# Patient Record
Sex: MALE | Race: WHITE | Employment: UNEMPLOYED | URBAN - METROPOLITAN AREA
[De-identification: names, ages, dates, MRNs, and addresses within clinical notes are randomized per-mention and may not be internally consistent; named-entity substitution may affect disease eponyms.]

---

## 2020-01-01 ENCOUNTER — APPOINTMENT (OUTPATIENT)
Dept: NON INVASIVE DIAGNOSTICS | Age: 85
DRG: 871 | End: 2020-01-01
Attending: INTERNAL MEDICINE
Payer: MEDICARE

## 2020-01-01 ENCOUNTER — APPOINTMENT (OUTPATIENT)
Dept: MRI IMAGING | Age: 85
DRG: 871 | End: 2020-01-01
Attending: INTERNAL MEDICINE
Payer: MEDICARE

## 2020-01-01 ENCOUNTER — APPOINTMENT (OUTPATIENT)
Dept: GENERAL RADIOLOGY | Age: 85
DRG: 871 | End: 2020-01-01
Attending: EMERGENCY MEDICINE
Payer: MEDICARE

## 2020-01-01 ENCOUNTER — APPOINTMENT (OUTPATIENT)
Dept: GENERAL RADIOLOGY | Age: 85
DRG: 871 | End: 2020-01-01
Attending: INTERNAL MEDICINE
Payer: MEDICARE

## 2020-01-01 ENCOUNTER — APPOINTMENT (OUTPATIENT)
Dept: CT IMAGING | Age: 85
DRG: 871 | End: 2020-01-01
Attending: INTERNAL MEDICINE
Payer: MEDICARE

## 2020-01-01 ENCOUNTER — HOSPITAL ENCOUNTER (INPATIENT)
Age: 85
LOS: 11 days | DRG: 871 | End: 2020-11-23
Attending: EMERGENCY MEDICINE | Admitting: INTERNAL MEDICINE
Payer: MEDICARE

## 2020-01-01 ENCOUNTER — APPOINTMENT (OUTPATIENT)
Dept: CT IMAGING | Age: 85
DRG: 871 | End: 2020-01-01
Attending: EMERGENCY MEDICINE
Payer: MEDICARE

## 2020-01-01 ENCOUNTER — APPOINTMENT (OUTPATIENT)
Dept: VASCULAR SURGERY | Age: 85
DRG: 871 | End: 2020-01-01
Attending: INTERNAL MEDICINE
Payer: MEDICARE

## 2020-01-01 ENCOUNTER — APPOINTMENT (OUTPATIENT)
Dept: GENERAL RADIOLOGY | Age: 85
DRG: 871 | End: 2020-01-01
Attending: FAMILY MEDICINE
Payer: MEDICARE

## 2020-01-01 VITALS
HEART RATE: 81 BPM | WEIGHT: 228 LBS | TEMPERATURE: 98.2 F | SYSTOLIC BLOOD PRESSURE: 74 MMHG | BODY MASS INDEX: 29.26 KG/M2 | RESPIRATION RATE: 26 BRPM | OXYGEN SATURATION: 98 % | DIASTOLIC BLOOD PRESSURE: 47 MMHG | HEIGHT: 74 IN

## 2020-01-01 DIAGNOSIS — I69.30 LATE EFFECT OF STROKE: ICD-10-CM

## 2020-01-01 DIAGNOSIS — F01.52 VASCULAR DEMENTIA WITH DELUSIONS: ICD-10-CM

## 2020-01-01 DIAGNOSIS — A41.9 SEPSIS, DUE TO UNSPECIFIED ORGANISM, UNSPECIFIED WHETHER ACUTE ORGAN DYSFUNCTION PRESENT (HCC): ICD-10-CM

## 2020-01-01 DIAGNOSIS — H91.90 HEARING LOSS, UNSPECIFIED HEARING LOSS TYPE, UNSPECIFIED LATERALITY: ICD-10-CM

## 2020-01-01 DIAGNOSIS — Z71.89 GOALS OF CARE, COUNSELING/DISCUSSION: ICD-10-CM

## 2020-01-01 DIAGNOSIS — R41.82 ALTERED MENTAL STATUS, UNSPECIFIED ALTERED MENTAL STATUS TYPE: ICD-10-CM

## 2020-01-01 DIAGNOSIS — R53.81 DEBILITY: ICD-10-CM

## 2020-01-01 DIAGNOSIS — I21.4 NSTEMI (NON-ST ELEVATED MYOCARDIAL INFARCTION) (HCC): Primary | ICD-10-CM

## 2020-01-01 LAB
ALBUMIN SERPL-MCNC: 3.1 G/DL (ref 3.5–5)
ALBUMIN SERPL-MCNC: 3.6 G/DL (ref 3.5–5)
ALBUMIN/GLOB SERPL: 0.8 {RATIO} (ref 1.1–2.2)
ALBUMIN/GLOB SERPL: 0.9 {RATIO} (ref 1.1–2.2)
ALP SERPL-CCNC: 75 U/L (ref 45–117)
ALP SERPL-CCNC: 76 U/L (ref 45–117)
ALT SERPL-CCNC: 52 U/L (ref 12–78)
ALT SERPL-CCNC: 54 U/L (ref 12–78)
AMMONIA PLAS-SCNC: 17 UMOL/L
ANION GAP SERPL CALC-SCNC: 10 MMOL/L (ref 5–15)
ANION GAP SERPL CALC-SCNC: 11 MMOL/L (ref 5–15)
ANION GAP SERPL CALC-SCNC: 12 MMOL/L (ref 5–15)
ANION GAP SERPL CALC-SCNC: 12 MMOL/L (ref 5–15)
ANION GAP SERPL CALC-SCNC: 14 MMOL/L (ref 5–15)
ANION GAP SERPL CALC-SCNC: 4 MMOL/L (ref 5–15)
ANION GAP SERPL CALC-SCNC: 5 MMOL/L (ref 5–15)
ANION GAP SERPL CALC-SCNC: 6 MMOL/L (ref 5–15)
ANION GAP SERPL CALC-SCNC: 7 MMOL/L (ref 5–15)
ANION GAP SERPL CALC-SCNC: 9 MMOL/L (ref 5–15)
ANION GAP SERPL CALC-SCNC: 9 MMOL/L (ref 5–15)
APPEARANCE UR: CLEAR
APTT PPP: 22.6 SEC (ref 22.1–31)
APTT PPP: 29 SEC (ref 22.1–31)
APTT PPP: 37.2 SEC (ref 22.1–31)
APTT PPP: 55.6 SEC (ref 22.1–31)
APTT PPP: 59.8 SEC (ref 22.1–31)
APTT PPP: 62.6 SEC (ref 22.1–31)
AST SERPL-CCNC: 187 U/L (ref 15–37)
AST SERPL-CCNC: 313 U/L (ref 15–37)
ATRIAL RATE: 108 BPM
ATRIAL RATE: 117 BPM
ATRIAL RATE: 98 BPM
BACTERIA SPEC CULT: ABNORMAL
BACTERIA SPEC CULT: ABNORMAL
BACTERIA SPEC CULT: NORMAL
BACTERIA URNS QL MICRO: NEGATIVE /HPF
BASOPHILS # BLD: 0 K/UL (ref 0–0.1)
BASOPHILS # BLD: 0.1 K/UL (ref 0–0.1)
BASOPHILS # BLD: 0.2 K/UL (ref 0–0.1)
BASOPHILS NFR BLD: 0 % (ref 0–1)
BASOPHILS NFR BLD: 1 % (ref 0–1)
BILIRUB SERPL-MCNC: 1.3 MG/DL (ref 0.2–1)
BILIRUB SERPL-MCNC: 1.3 MG/DL (ref 0.2–1)
BILIRUB UR QL: NEGATIVE
BNP SERPL-MCNC: ABNORMAL PG/ML
BUN SERPL-MCNC: 27 MG/DL (ref 6–20)
BUN SERPL-MCNC: 33 MG/DL (ref 6–20)
BUN SERPL-MCNC: 36 MG/DL (ref 6–20)
BUN SERPL-MCNC: 36 MG/DL (ref 6–20)
BUN SERPL-MCNC: 40 MG/DL (ref 6–20)
BUN SERPL-MCNC: 46 MG/DL (ref 6–20)
BUN SERPL-MCNC: 47 MG/DL (ref 6–20)
BUN SERPL-MCNC: 49 MG/DL (ref 6–20)
BUN SERPL-MCNC: 53 MG/DL (ref 6–20)
BUN SERPL-MCNC: 57 MG/DL (ref 6–20)
BUN SERPL-MCNC: 76 MG/DL (ref 6–20)
BUN/CREAT SERPL: 19 (ref 12–20)
BUN/CREAT SERPL: 27 (ref 12–20)
BUN/CREAT SERPL: 28 (ref 12–20)
BUN/CREAT SERPL: 34 (ref 12–20)
BUN/CREAT SERPL: 39 (ref 12–20)
BUN/CREAT SERPL: 41 (ref 12–20)
BUN/CREAT SERPL: 43 (ref 12–20)
BUN/CREAT SERPL: 44 (ref 12–20)
BUN/CREAT SERPL: 51 (ref 12–20)
BUN/CREAT SERPL: 54 (ref 12–20)
BUN/CREAT SERPL: 58 (ref 12–20)
CALCIUM SERPL-MCNC: 10 MG/DL (ref 8.5–10.1)
CALCIUM SERPL-MCNC: 10.4 MG/DL (ref 8.5–10.1)
CALCIUM SERPL-MCNC: 8.8 MG/DL (ref 8.5–10.1)
CALCIUM SERPL-MCNC: 9 MG/DL (ref 8.5–10.1)
CALCIUM SERPL-MCNC: 9.1 MG/DL (ref 8.5–10.1)
CALCIUM SERPL-MCNC: 9.3 MG/DL (ref 8.5–10.1)
CALCIUM SERPL-MCNC: 9.4 MG/DL (ref 8.5–10.1)
CALCIUM SERPL-MCNC: 9.6 MG/DL (ref 8.5–10.1)
CALCIUM SERPL-MCNC: 9.7 MG/DL (ref 8.5–10.1)
CALCULATED R AXIS, ECG10: -77 DEGREES
CALCULATED R AXIS, ECG10: -78 DEGREES
CALCULATED R AXIS, ECG10: -82 DEGREES
CALCULATED T AXIS, ECG11: 82 DEGREES
CALCULATED T AXIS, ECG11: 82 DEGREES
CALCULATED T AXIS, ECG11: 83 DEGREES
CHLORIDE SERPL-SCNC: 104 MMOL/L (ref 97–108)
CHLORIDE SERPL-SCNC: 107 MMOL/L (ref 97–108)
CHLORIDE SERPL-SCNC: 108 MMOL/L (ref 97–108)
CHLORIDE SERPL-SCNC: 109 MMOL/L (ref 97–108)
CHLORIDE SERPL-SCNC: 109 MMOL/L (ref 97–108)
CHLORIDE SERPL-SCNC: 112 MMOL/L (ref 97–108)
CHLORIDE SERPL-SCNC: 113 MMOL/L (ref 97–108)
CHLORIDE SERPL-SCNC: 116 MMOL/L (ref 97–108)
CHLORIDE SERPL-SCNC: 116 MMOL/L (ref 97–108)
CK MB CFR SERPL CALC: 8.2 % (ref 0–2.5)
CK MB SERPL-MCNC: 110.6 NG/ML (ref 5–25)
CK SERPL-CCNC: 1347 U/L (ref 39–308)
CK SERPL-CCNC: 1365 U/L (ref 39–308)
CK SERPL-CCNC: 335 U/L (ref 39–308)
CK SERPL-CCNC: 690 U/L (ref 39–308)
CO2 SERPL-SCNC: 14 MMOL/L (ref 21–32)
CO2 SERPL-SCNC: 15 MMOL/L (ref 21–32)
CO2 SERPL-SCNC: 18 MMOL/L (ref 21–32)
CO2 SERPL-SCNC: 19 MMOL/L (ref 21–32)
CO2 SERPL-SCNC: 20 MMOL/L (ref 21–32)
CO2 SERPL-SCNC: 20 MMOL/L (ref 21–32)
CO2 SERPL-SCNC: 21 MMOL/L (ref 21–32)
CO2 SERPL-SCNC: 24 MMOL/L (ref 21–32)
CO2 SERPL-SCNC: 25 MMOL/L (ref 21–32)
COLOR UR: ABNORMAL
COMMENT, HOLDF: NORMAL
COMMENT, HOLDF: NORMAL
COVID-19 RAPID TEST, COVR: NOT DETECTED
CREAT SERPL-MCNC: 0.98 MG/DL (ref 0.7–1.3)
CREAT SERPL-MCNC: 0.98 MG/DL (ref 0.7–1.3)
CREAT SERPL-MCNC: 0.99 MG/DL (ref 0.7–1.3)
CREAT SERPL-MCNC: 1.02 MG/DL (ref 0.7–1.3)
CREAT SERPL-MCNC: 1.06 MG/DL (ref 0.7–1.3)
CREAT SERPL-MCNC: 1.11 MG/DL (ref 0.7–1.3)
CREAT SERPL-MCNC: 1.15 MG/DL (ref 0.7–1.3)
CREAT SERPL-MCNC: 1.27 MG/DL (ref 0.7–1.3)
CREAT SERPL-MCNC: 1.34 MG/DL (ref 0.7–1.3)
CREAT SERPL-MCNC: 1.43 MG/DL (ref 0.7–1.3)
CREAT SERPL-MCNC: 1.48 MG/DL (ref 0.7–1.3)
D DIMER PPP FEU-MCNC: 0.87 MG/L FEU (ref 0–0.65)
DATE LAST DOSE: ABNORMAL
DIAGNOSIS, 93000: NORMAL
DIFFERENTIAL METHOD BLD: ABNORMAL
ECHO AO ROOT DIAM: 3.27 CM
ECHO AV AREA PEAK VELOCITY: 3.65 CM2
ECHO AV AREA/BSA PEAK VELOCITY: 1.7 CM2/M2
ECHO AV PEAK GRADIENT: 2.42 MMHG
ECHO AV PEAK VELOCITY: 77.78 CM/S
ECHO LA AREA 4C: 25.04 CM2
ECHO LA MAJOR AXIS: 3.82 CM
ECHO LA MINOR AXIS: 1.73 CM
ECHO LA VOL 2C: 88.7 ML (ref 18–58)
ECHO LA VOL 4C: 79.01 ML (ref 18–58)
ECHO LA VOL BP: 89.86 ML (ref 18–58)
ECHO LA VOL/BSA BIPLANE: 40.75 ML/M2 (ref 16–28)
ECHO LA VOLUME INDEX A2C: 40.22 ML/M2 (ref 16–28)
ECHO LA VOLUME INDEX A4C: 35.83 ML/M2 (ref 16–28)
ECHO LV EDV A2C: 126.77 ML
ECHO LV EDV A4C: 135.1 ML
ECHO LV EDV BP: 132.57 ML (ref 67–155)
ECHO LV EDV INDEX A4C: 61.3 ML/M2
ECHO LV EDV INDEX BP: 60.1 ML/M2
ECHO LV EDV NDEX A2C: 57.5 ML/M2
ECHO LV EJECTION FRACTION A2C: 4 PERCENT
ECHO LV EJECTION FRACTION A4C: 31 PERCENT
ECHO LV EJECTION FRACTION BIPLANE: 19.4 PERCENT (ref 55–100)
ECHO LV ESV A2C: 121.1 ML
ECHO LV ESV A4C: 93.32 ML
ECHO LV ESV BP: 106.85 ML (ref 22–58)
ECHO LV ESV INDEX A2C: 54.9 ML/M2
ECHO LV ESV INDEX A4C: 42.3 ML/M2
ECHO LV ESV INDEX BP: 48.5 ML/M2
ECHO LV INTERNAL DIMENSION DIASTOLIC: 5.18 CM (ref 4.2–5.9)
ECHO LV INTERNAL DIMENSION SYSTOLIC: 4.6 CM
ECHO LV IVSD: 1.43 CM (ref 0.6–1)
ECHO LV MASS 2D: 304.6 G (ref 88–224)
ECHO LV MASS INDEX 2D: 138.1 G/M2 (ref 49–115)
ECHO LV POSTERIOR WALL DIASTOLIC: 1.35 CM (ref 0.6–1)
ECHO LVOT DIAM: 2.14 CM
ECHO LVOT PEAK GRADIENT: 2.49 MMHG
ECHO LVOT PEAK VELOCITY: 78.88 CM/S
ECHO PV MAX VELOCITY: 56.86 CM/S
ECHO PV PEAK INSTANTANEOUS GRADIENT SYSTOLIC: 1.29 MMHG
ECHO RV INTERNAL DIMENSION: 3.71 CM
ECHO RV TAPSE: 0.86 CM (ref 1.5–2)
EOSINOPHIL # BLD: 0 K/UL (ref 0–0.4)
EOSINOPHIL # BLD: 0.1 K/UL (ref 0–0.4)
EOSINOPHIL # BLD: 0.2 K/UL (ref 0–0.4)
EOSINOPHIL # BLD: 0.4 K/UL (ref 0–0.4)
EOSINOPHIL # BLD: 0.9 K/UL (ref 0–0.4)
EOSINOPHIL NFR BLD: 0 % (ref 0–7)
EOSINOPHIL NFR BLD: 1 % (ref 0–7)
EOSINOPHIL NFR BLD: 1 % (ref 0–7)
EOSINOPHIL NFR BLD: 2 % (ref 0–7)
EOSINOPHIL NFR BLD: 4 % (ref 0–7)
EPITH CASTS URNS QL MICRO: ABNORMAL /LPF
ERYTHROCYTE [DISTWIDTH] IN BLOOD BY AUTOMATED COUNT: 13.3 % (ref 11.5–14.5)
ERYTHROCYTE [DISTWIDTH] IN BLOOD BY AUTOMATED COUNT: 13.4 % (ref 11.5–14.5)
ERYTHROCYTE [DISTWIDTH] IN BLOOD BY AUTOMATED COUNT: 13.8 % (ref 11.5–14.5)
ERYTHROCYTE [DISTWIDTH] IN BLOOD BY AUTOMATED COUNT: 13.8 % (ref 11.5–14.5)
ERYTHROCYTE [DISTWIDTH] IN BLOOD BY AUTOMATED COUNT: 13.9 % (ref 11.5–14.5)
ERYTHROCYTE [DISTWIDTH] IN BLOOD BY AUTOMATED COUNT: 14.1 % (ref 11.5–14.5)
ERYTHROCYTE [DISTWIDTH] IN BLOOD BY AUTOMATED COUNT: 14.2 % (ref 11.5–14.5)
ERYTHROCYTE [DISTWIDTH] IN BLOOD BY AUTOMATED COUNT: 14.2 % (ref 11.5–14.5)
EST. AVERAGE GLUCOSE BLD GHB EST-MCNC: 189 MG/DL
ETHANOL SERPL-MCNC: <10 MG/DL
FOLATE BLD-MCNC: 352 NG/ML
FOLATE RBC-MCNC: 767 NG/ML
GLOBULIN SER CALC-MCNC: 3.8 G/DL (ref 2–4)
GLOBULIN SER CALC-MCNC: 3.9 G/DL (ref 2–4)
GLUCOSE BLD STRIP.AUTO-MCNC: 103 MG/DL (ref 65–100)
GLUCOSE BLD STRIP.AUTO-MCNC: 111 MG/DL (ref 65–100)
GLUCOSE BLD STRIP.AUTO-MCNC: 117 MG/DL (ref 65–100)
GLUCOSE BLD STRIP.AUTO-MCNC: 119 MG/DL (ref 65–100)
GLUCOSE BLD STRIP.AUTO-MCNC: 120 MG/DL (ref 65–100)
GLUCOSE BLD STRIP.AUTO-MCNC: 127 MG/DL (ref 65–100)
GLUCOSE BLD STRIP.AUTO-MCNC: 129 MG/DL (ref 65–100)
GLUCOSE BLD STRIP.AUTO-MCNC: 129 MG/DL (ref 65–100)
GLUCOSE BLD STRIP.AUTO-MCNC: 132 MG/DL (ref 65–100)
GLUCOSE BLD STRIP.AUTO-MCNC: 133 MG/DL (ref 65–100)
GLUCOSE BLD STRIP.AUTO-MCNC: 146 MG/DL (ref 65–100)
GLUCOSE BLD STRIP.AUTO-MCNC: 146 MG/DL (ref 65–100)
GLUCOSE BLD STRIP.AUTO-MCNC: 147 MG/DL (ref 65–100)
GLUCOSE BLD STRIP.AUTO-MCNC: 151 MG/DL (ref 65–100)
GLUCOSE BLD STRIP.AUTO-MCNC: 153 MG/DL (ref 65–100)
GLUCOSE BLD STRIP.AUTO-MCNC: 157 MG/DL (ref 65–100)
GLUCOSE BLD STRIP.AUTO-MCNC: 167 MG/DL (ref 65–100)
GLUCOSE BLD STRIP.AUTO-MCNC: 171 MG/DL (ref 65–100)
GLUCOSE BLD STRIP.AUTO-MCNC: 184 MG/DL (ref 65–100)
GLUCOSE BLD STRIP.AUTO-MCNC: 186 MG/DL (ref 65–100)
GLUCOSE BLD STRIP.AUTO-MCNC: 186 MG/DL (ref 65–100)
GLUCOSE BLD STRIP.AUTO-MCNC: 192 MG/DL (ref 65–100)
GLUCOSE BLD STRIP.AUTO-MCNC: 196 MG/DL (ref 65–100)
GLUCOSE BLD STRIP.AUTO-MCNC: 200 MG/DL (ref 65–100)
GLUCOSE BLD STRIP.AUTO-MCNC: 208 MG/DL (ref 65–100)
GLUCOSE BLD STRIP.AUTO-MCNC: 212 MG/DL (ref 65–100)
GLUCOSE BLD STRIP.AUTO-MCNC: 220 MG/DL (ref 65–100)
GLUCOSE BLD STRIP.AUTO-MCNC: 225 MG/DL (ref 65–100)
GLUCOSE BLD STRIP.AUTO-MCNC: 225 MG/DL (ref 65–100)
GLUCOSE BLD STRIP.AUTO-MCNC: 230 MG/DL (ref 65–100)
GLUCOSE BLD STRIP.AUTO-MCNC: 235 MG/DL (ref 65–100)
GLUCOSE BLD STRIP.AUTO-MCNC: 248 MG/DL (ref 65–100)
GLUCOSE BLD STRIP.AUTO-MCNC: 254 MG/DL (ref 65–100)
GLUCOSE BLD STRIP.AUTO-MCNC: 263 MG/DL (ref 65–100)
GLUCOSE BLD STRIP.AUTO-MCNC: 300 MG/DL (ref 65–100)
GLUCOSE BLD STRIP.AUTO-MCNC: 319 MG/DL (ref 65–100)
GLUCOSE BLD STRIP.AUTO-MCNC: 342 MG/DL (ref 65–100)
GLUCOSE BLD STRIP.AUTO-MCNC: 371 MG/DL (ref 65–100)
GLUCOSE BLD STRIP.AUTO-MCNC: 75 MG/DL (ref 65–100)
GLUCOSE SERPL-MCNC: 110 MG/DL (ref 65–100)
GLUCOSE SERPL-MCNC: 119 MG/DL (ref 65–100)
GLUCOSE SERPL-MCNC: 185 MG/DL (ref 65–100)
GLUCOSE SERPL-MCNC: 207 MG/DL (ref 65–100)
GLUCOSE SERPL-MCNC: 207 MG/DL (ref 65–100)
GLUCOSE SERPL-MCNC: 228 MG/DL (ref 65–100)
GLUCOSE SERPL-MCNC: 230 MG/DL (ref 65–100)
GLUCOSE SERPL-MCNC: 316 MG/DL (ref 65–100)
GLUCOSE SERPL-MCNC: 367 MG/DL (ref 65–100)
GLUCOSE SERPL-MCNC: 377 MG/DL (ref 65–100)
GLUCOSE SERPL-MCNC: 88 MG/DL (ref 65–100)
GLUCOSE UR STRIP.AUTO-MCNC: >1000 MG/DL
HBA1C MFR BLD: 8.2 % (ref 4–5.6)
HCT VFR BLD AUTO: 30.9 % (ref 36.6–50.3)
HCT VFR BLD AUTO: 35.3 % (ref 36.6–50.3)
HCT VFR BLD AUTO: 41.8 % (ref 36.6–50.3)
HCT VFR BLD AUTO: 42.6 % (ref 36.6–50.3)
HCT VFR BLD AUTO: 43.6 % (ref 36.6–50.3)
HCT VFR BLD AUTO: 44.2 % (ref 36.6–50.3)
HCT VFR BLD AUTO: 44.7 % (ref 36.6–50.3)
HCT VFR BLD AUTO: 45.9 % (ref 37.5–51)
HCT VFR BLD AUTO: 46 % (ref 36.6–50.3)
HCT VFR BLD AUTO: 46.3 % (ref 36.6–50.3)
HCT VFR BLD AUTO: 47.1 % (ref 36.6–50.3)
HEALTH STATUS, XMCV2T: NORMAL
HEALTH STATUS, XMCV2T: NORMAL
HGB BLD-MCNC: 10.1 G/DL (ref 12.1–17)
HGB BLD-MCNC: 11.9 G/DL (ref 12.1–17)
HGB BLD-MCNC: 14 G/DL (ref 12.1–17)
HGB BLD-MCNC: 14.4 G/DL (ref 12.1–17)
HGB BLD-MCNC: 14.7 G/DL (ref 12.1–17)
HGB BLD-MCNC: 15 G/DL (ref 12.1–17)
HGB BLD-MCNC: 15 G/DL (ref 12.1–17)
HGB BLD-MCNC: 15.2 G/DL (ref 12.1–17)
HGB BLD-MCNC: 16.2 G/DL (ref 12.1–17)
HGB BLD-MCNC: 16.4 G/DL (ref 12.1–17)
HGB UR QL STRIP: ABNORMAL
HYALINE CASTS URNS QL MICRO: ABNORMAL /LPF (ref 0–5)
IMM GRANULOCYTES # BLD AUTO: 0 K/UL
IMM GRANULOCYTES # BLD AUTO: 0.2 K/UL (ref 0–0.04)
IMM GRANULOCYTES # BLD AUTO: 0.3 K/UL (ref 0–0.04)
IMM GRANULOCYTES # BLD AUTO: 0.3 K/UL (ref 0–0.04)
IMM GRANULOCYTES NFR BLD AUTO: 0 %
IMM GRANULOCYTES NFR BLD AUTO: 1 % (ref 0–0.5)
IMM GRANULOCYTES NFR BLD AUTO: 2 % (ref 0–0.5)
KETONES UR QL STRIP.AUTO: 15 MG/DL
LACTATE SERPL-SCNC: 2.3 MMOL/L (ref 0.4–2)
LACTATE SERPL-SCNC: 2.5 MMOL/L (ref 0.4–2)
LACTATE SERPL-SCNC: 2.6 MMOL/L (ref 0.4–2)
LACTATE SERPL-SCNC: 2.6 MMOL/L (ref 0.4–2)
LACTATE SERPL-SCNC: 3.6 MMOL/L (ref 0.4–2)
LACTATE SERPL-SCNC: 3.8 MMOL/L (ref 0.4–2)
LACTATE SERPL-SCNC: 3.8 MMOL/L (ref 0.4–2)
LACTATE SERPL-SCNC: 4.2 MMOL/L (ref 0.4–2)
LEFT CCA DIST DIAS: 6.2 CM/S
LEFT CCA DIST SYS: 36.3 CM/S
LEFT CCA PROX DIAS: 6.4 CM/S
LEFT CCA PROX SYS: 41.3 CM/S
LEFT ECA DIAS: 0 CM/S
LEFT ECA SYS: 47.8 CM/S
LEFT ICA DIST DIAS: 11.6 CM/S
LEFT ICA DIST SYS: 57.8 CM/S
LEFT ICA MID DIAS: 19.5 CM/S
LEFT ICA MID SYS: 54.4 CM/S
LEFT ICA PROX DIAS: 17.7 CM/S
LEFT ICA PROX SYS: 67.4 CM/S
LEFT ICA/CCA SYS: 1.86
LEFT VERTEBRAL DIAS: 0 CM/S
LEFT VERTEBRAL SYS: 46.5 CM/S
LEUKOCYTE ESTERASE UR QL STRIP.AUTO: NEGATIVE
LIPASE SERPL-CCNC: 45 U/L (ref 73–393)
LYMPHOCYTES # BLD: 4.3 K/UL (ref 0.8–3.5)
LYMPHOCYTES # BLD: 4.5 K/UL (ref 0.8–3.5)
LYMPHOCYTES # BLD: 4.6 K/UL (ref 0.8–3.5)
LYMPHOCYTES # BLD: 4.6 K/UL (ref 0.8–3.5)
LYMPHOCYTES # BLD: 4.8 K/UL (ref 0.8–3.5)
LYMPHOCYTES # BLD: 5 K/UL (ref 0.8–3.5)
LYMPHOCYTES # BLD: 5 K/UL (ref 0.8–3.5)
LYMPHOCYTES # BLD: 5.1 K/UL (ref 0.8–3.5)
LYMPHOCYTES # BLD: 5.2 K/UL (ref 0.8–3.5)
LYMPHOCYTES # BLD: 6.9 K/UL (ref 0.8–3.5)
LYMPHOCYTES NFR BLD: 18 % (ref 12–49)
LYMPHOCYTES NFR BLD: 20 % (ref 12–49)
LYMPHOCYTES NFR BLD: 21 % (ref 12–49)
LYMPHOCYTES NFR BLD: 21 % (ref 12–49)
LYMPHOCYTES NFR BLD: 23 % (ref 12–49)
LYMPHOCYTES NFR BLD: 25 % (ref 12–49)
LYMPHOCYTES NFR BLD: 28 % (ref 12–49)
LYMPHOCYTES NFR BLD: 29 % (ref 12–49)
MAGNESIUM SERPL-MCNC: 2.1 MG/DL (ref 1.6–2.4)
MCH RBC QN AUTO: 30.5 PG (ref 26–34)
MCH RBC QN AUTO: 30.7 PG (ref 26–34)
MCH RBC QN AUTO: 30.8 PG (ref 26–34)
MCH RBC QN AUTO: 30.8 PG (ref 26–34)
MCH RBC QN AUTO: 30.9 PG (ref 26–34)
MCH RBC QN AUTO: 31 PG (ref 26–34)
MCH RBC QN AUTO: 31 PG (ref 26–34)
MCH RBC QN AUTO: 31.1 PG (ref 26–34)
MCH RBC QN AUTO: 31.2 PG (ref 26–34)
MCH RBC QN AUTO: 31.2 PG (ref 26–34)
MCHC RBC AUTO-ENTMCNC: 32.7 G/DL (ref 30–36.5)
MCHC RBC AUTO-ENTMCNC: 33 G/DL (ref 30–36.5)
MCHC RBC AUTO-ENTMCNC: 33.3 G/DL (ref 30–36.5)
MCHC RBC AUTO-ENTMCNC: 33.5 G/DL (ref 30–36.5)
MCHC RBC AUTO-ENTMCNC: 33.6 G/DL (ref 30–36.5)
MCHC RBC AUTO-ENTMCNC: 33.7 G/DL (ref 30–36.5)
MCHC RBC AUTO-ENTMCNC: 33.8 G/DL (ref 30–36.5)
MCHC RBC AUTO-ENTMCNC: 34.4 G/DL (ref 30–36.5)
MCHC RBC AUTO-ENTMCNC: 34.8 G/DL (ref 30–36.5)
MCHC RBC AUTO-ENTMCNC: 35 G/DL (ref 30–36.5)
MCV RBC AUTO: 88.9 FL (ref 80–99)
MCV RBC AUTO: 88.9 FL (ref 80–99)
MCV RBC AUTO: 90.3 FL (ref 80–99)
MCV RBC AUTO: 90.6 FL (ref 80–99)
MCV RBC AUTO: 91.6 FL (ref 80–99)
MCV RBC AUTO: 92.5 FL (ref 80–99)
MCV RBC AUTO: 92.5 FL (ref 80–99)
MCV RBC AUTO: 92.7 FL (ref 80–99)
MCV RBC AUTO: 93.3 FL (ref 80–99)
MCV RBC AUTO: 94.8 FL (ref 80–99)
MONOCYTES # BLD: 1.6 K/UL (ref 0–1)
MONOCYTES # BLD: 1.8 K/UL (ref 0–1)
MONOCYTES # BLD: 2.1 K/UL (ref 0–1)
MONOCYTES # BLD: 2.2 K/UL (ref 0–1)
MONOCYTES # BLD: 2.3 K/UL (ref 0–1)
MONOCYTES # BLD: 2.4 K/UL (ref 0–1)
MONOCYTES # BLD: 2.4 K/UL (ref 0–1)
MONOCYTES # BLD: 2.7 K/UL (ref 0–1)
MONOCYTES NFR BLD: 10 % (ref 5–13)
MONOCYTES NFR BLD: 12 % (ref 5–13)
MONOCYTES NFR BLD: 13 % (ref 5–13)
MONOCYTES NFR BLD: 13 % (ref 5–13)
MONOCYTES NFR BLD: 7 % (ref 5–13)
MONOCYTES NFR BLD: 9 % (ref 5–13)
MONOCYTES NFR BLD: 9 % (ref 5–13)
NEUTS BAND NFR BLD MANUAL: 1 % (ref 0–6)
NEUTS SEG # BLD: 10.6 K/UL (ref 1.8–8)
NEUTS SEG # BLD: 11 K/UL (ref 1.8–8)
NEUTS SEG # BLD: 12.2 K/UL (ref 1.8–8)
NEUTS SEG # BLD: 14.4 K/UL (ref 1.8–8)
NEUTS SEG # BLD: 14.9 K/UL (ref 1.8–8)
NEUTS SEG # BLD: 15.7 K/UL (ref 1.8–8)
NEUTS SEG # BLD: 15.9 K/UL (ref 1.8–8)
NEUTS SEG # BLD: 17.5 K/UL (ref 1.8–8)
NEUTS SEG # BLD: 17.9 K/UL (ref 1.8–8)
NEUTS SEG # BLD: 9.3 K/UL (ref 1.8–8)
NEUTS SEG NFR BLD: 54 % (ref 32–75)
NEUTS SEG NFR BLD: 57 % (ref 32–75)
NEUTS SEG NFR BLD: 62 % (ref 32–75)
NEUTS SEG NFR BLD: 63 % (ref 32–75)
NEUTS SEG NFR BLD: 64 % (ref 32–75)
NEUTS SEG NFR BLD: 66 % (ref 32–75)
NEUTS SEG NFR BLD: 68 % (ref 32–75)
NEUTS SEG NFR BLD: 68 % (ref 32–75)
NEUTS SEG NFR BLD: 69 % (ref 32–75)
NEUTS SEG NFR BLD: 72 % (ref 32–75)
NITRITE UR QL STRIP.AUTO: NEGATIVE
NRBC # BLD: 0 K/UL (ref 0–0.01)
NRBC # BLD: 0.02 K/UL (ref 0–0.01)
NRBC # BLD: 0.02 K/UL (ref 0–0.01)
NRBC # BLD: 0.05 K/UL (ref 0–0.01)
NRBC BLD-RTO: 0 PER 100 WBC
NRBC BLD-RTO: 0.1 PER 100 WBC
NRBC BLD-RTO: 0.1 PER 100 WBC
NRBC BLD-RTO: 0.2 PER 100 WBC
PH UR STRIP: 5 [PH] (ref 5–8)
PHOSPHATE SERPL-MCNC: 2.9 MG/DL (ref 2.6–4.7)
PLATELET # BLD AUTO: 125 K/UL (ref 150–400)
PLATELET # BLD AUTO: 167 K/UL (ref 150–400)
PLATELET # BLD AUTO: 169 K/UL (ref 150–400)
PLATELET # BLD AUTO: 176 K/UL (ref 150–400)
PLATELET # BLD AUTO: 180 K/UL (ref 150–400)
PLATELET # BLD AUTO: 187 K/UL (ref 150–400)
PLATELET # BLD AUTO: 193 K/UL (ref 150–400)
PLATELET # BLD AUTO: 207 K/UL (ref 150–400)
PLATELET # BLD AUTO: 219 K/UL (ref 150–400)
PLATELET # BLD AUTO: 225 K/UL (ref 150–400)
PLATELET COMMENTS,PCOM: ABNORMAL
PMV BLD AUTO: 11.9 FL (ref 8.9–12.9)
PMV BLD AUTO: 12.2 FL (ref 8.9–12.9)
PMV BLD AUTO: 12.4 FL (ref 8.9–12.9)
PMV BLD AUTO: 12.5 FL (ref 8.9–12.9)
PMV BLD AUTO: 12.5 FL (ref 8.9–12.9)
PMV BLD AUTO: 12.8 FL (ref 8.9–12.9)
PMV BLD AUTO: 12.9 FL (ref 8.9–12.9)
PMV BLD AUTO: 13 FL (ref 8.9–12.9)
POTASSIUM SERPL-SCNC: 3.1 MMOL/L (ref 3.5–5.1)
POTASSIUM SERPL-SCNC: 3.9 MMOL/L (ref 3.5–5.1)
POTASSIUM SERPL-SCNC: 4 MMOL/L (ref 3.5–5.1)
POTASSIUM SERPL-SCNC: 4 MMOL/L (ref 3.5–5.1)
POTASSIUM SERPL-SCNC: 4.1 MMOL/L (ref 3.5–5.1)
POTASSIUM SERPL-SCNC: 4.2 MMOL/L (ref 3.5–5.1)
POTASSIUM SERPL-SCNC: 4.2 MMOL/L (ref 3.5–5.1)
POTASSIUM SERPL-SCNC: 4.4 MMOL/L (ref 3.5–5.1)
POTASSIUM SERPL-SCNC: 4.7 MMOL/L (ref 3.5–5.1)
POTASSIUM SERPL-SCNC: 4.8 MMOL/L (ref 3.5–5.1)
POTASSIUM SERPL-SCNC: 5.3 MMOL/L (ref 3.5–5.1)
PROT SERPL-MCNC: 7 G/DL (ref 6.4–8.2)
PROT SERPL-MCNC: 7.4 G/DL (ref 6.4–8.2)
PROT UR STRIP-MCNC: 100 MG/DL
Q-T INTERVAL, ECG07: 388 MS
Q-T INTERVAL, ECG07: 388 MS
Q-T INTERVAL, ECG07: 398 MS
QRS DURATION, ECG06: 150 MS
QRS DURATION, ECG06: 150 MS
QRS DURATION, ECG06: 162 MS
QTC CALCULATION (BEZET), ECG08: 510 MS
QTC CALCULATION (BEZET), ECG08: 522 MS
QTC CALCULATION (BEZET), ECG08: 534 MS
RBC # BLD AUTO: 3.26 M/UL (ref 4.1–5.7)
RBC # BLD AUTO: 3.81 M/UL (ref 4.1–5.7)
RBC # BLD AUTO: 4.52 M/UL (ref 4.1–5.7)
RBC # BLD AUTO: 4.72 M/UL (ref 4.1–5.7)
RBC # BLD AUTO: 4.78 M/UL (ref 4.1–5.7)
RBC # BLD AUTO: 4.81 M/UL (ref 4.1–5.7)
RBC # BLD AUTO: 4.88 M/UL (ref 4.1–5.7)
RBC # BLD AUTO: 4.93 M/UL (ref 4.1–5.7)
RBC # BLD AUTO: 5.21 M/UL (ref 4.1–5.7)
RBC # BLD AUTO: 5.3 M/UL (ref 4.1–5.7)
RBC #/AREA URNS HPF: ABNORMAL /HPF (ref 0–5)
RBC MORPH BLD: ABNORMAL
REPORTED DOSE,DOSE: ABNORMAL UNITS
REPORTED DOSE/TIME,TMG: ABNORMAL
RIGHT CCA DIST DIAS: 0 CM/S
RIGHT CCA DIST SYS: 40.8 CM/S
RIGHT CCA PROX DIAS: 0 CM/S
RIGHT CCA PROX SYS: 36.9 CM/S
RIGHT ECA DIAS: 5.64 CM/S
RIGHT ECA SYS: 48.4 CM/S
RIGHT ICA DIST DIAS: 7.1 CM/S
RIGHT ICA DIST SYS: 42.2 CM/S
RIGHT ICA MID DIAS: 12.4 CM/S
RIGHT ICA MID SYS: 53.1 CM/S
RIGHT ICA PROX DIAS: 12.4 CM/S
RIGHT ICA PROX SYS: 66.3 CM/S
RIGHT ICA/CCA SYS: 1.6
RIGHT VERTEBRAL DIAS: 6.68 CM/S
RIGHT VERTEBRAL SYS: 62.2 CM/S
SAMPLES BEING HELD,HOLD: NORMAL
SAMPLES BEING HELD,HOLD: NORMAL
SARS-COV-2, COV2: NOT DETECTED
SARS-COV-2, COV2: NOT DETECTED
SERVICE CMNT-IMP: ABNORMAL
SERVICE CMNT-IMP: NORMAL
SODIUM SERPL-SCNC: 136 MMOL/L (ref 136–145)
SODIUM SERPL-SCNC: 137 MMOL/L (ref 136–145)
SODIUM SERPL-SCNC: 138 MMOL/L (ref 136–145)
SODIUM SERPL-SCNC: 139 MMOL/L (ref 136–145)
SODIUM SERPL-SCNC: 140 MMOL/L (ref 136–145)
SODIUM SERPL-SCNC: 141 MMOL/L (ref 136–145)
SODIUM SERPL-SCNC: 143 MMOL/L (ref 136–145)
SODIUM SERPL-SCNC: 145 MMOL/L (ref 136–145)
SOURCE, COVRS: NORMAL
SP GR UR REFRACTOMETRY: 1.02
SPECIMEN SOURCE, FCOV2M: NORMAL
SPECIMEN TYPE, XMCV1T: NORMAL
THERAPEUTIC RANGE,PTTT: ABNORMAL SECS (ref 58–77)
THERAPEUTIC RANGE,PTTT: NORMAL SECS (ref 58–77)
THERAPEUTIC RANGE,PTTT: NORMAL SECS (ref 58–77)
TROPONIN I SERPL-MCNC: 21.6 NG/ML
TROPONIN I SERPL-MCNC: 33.7 NG/ML
TROPONIN I SERPL-MCNC: 54.6 NG/ML
TSH SERPL DL<=0.05 MIU/L-ACNC: 0.44 UIU/ML (ref 0.36–3.74)
UROBILINOGEN UR QL STRIP.AUTO: 0.2 EU/DL (ref 0.2–1)
VANCOMYCIN TROUGH SERPL-MCNC: 13 UG/ML (ref 5–10)
VENTRICULAR RATE, ECG03: 109 BPM
VENTRICULAR RATE, ECG03: 114 BPM
VENTRICULAR RATE, ECG03: 99 BPM
VIT B12 SERPL-MCNC: 1203 PG/ML (ref 193–986)
WBC # BLD AUTO: 17.5 K/UL (ref 4.1–11.1)
WBC # BLD AUTO: 17.5 K/UL (ref 4.1–11.1)
WBC # BLD AUTO: 18.6 K/UL (ref 4.1–11.1)
WBC # BLD AUTO: 19.8 K/UL (ref 4.1–11.1)
WBC # BLD AUTO: 21.8 K/UL (ref 4.1–11.1)
WBC # BLD AUTO: 21.9 K/UL (ref 4.1–11.1)
WBC # BLD AUTO: 22.8 K/UL (ref 4.1–11.1)
WBC # BLD AUTO: 23 K/UL (ref 4.1–11.1)
WBC # BLD AUTO: 24.8 K/UL (ref 4.1–11.1)
WBC # BLD AUTO: 27.4 K/UL (ref 4.1–11.1)
WBC URNS QL MICRO: ABNORMAL /HPF (ref 0–4)

## 2020-01-01 PROCEDURE — 74011000258 HC RX REV CODE- 258: Performed by: INTERNAL MEDICINE

## 2020-01-01 PROCEDURE — 97166 OT EVAL MOD COMPLEX 45 MIN: CPT

## 2020-01-01 PROCEDURE — 74011250636 HC RX REV CODE- 250/636: Performed by: INTERNAL MEDICINE

## 2020-01-01 PROCEDURE — 74011250637 HC RX REV CODE- 250/637: Performed by: INTERNAL MEDICINE

## 2020-01-01 PROCEDURE — 85025 COMPLETE CBC W/AUTO DIFF WBC: CPT

## 2020-01-01 PROCEDURE — 80048 BASIC METABOLIC PNL TOTAL CA: CPT

## 2020-01-01 PROCEDURE — 82962 GLUCOSE BLOOD TEST: CPT

## 2020-01-01 PROCEDURE — 82550 ASSAY OF CK (CPK): CPT

## 2020-01-01 PROCEDURE — 74011250636 HC RX REV CODE- 250/636: Performed by: FAMILY MEDICINE

## 2020-01-01 PROCEDURE — 74011636637 HC RX REV CODE- 636/637: Performed by: INTERNAL MEDICINE

## 2020-01-01 PROCEDURE — 97116 GAIT TRAINING THERAPY: CPT

## 2020-01-01 PROCEDURE — 97535 SELF CARE MNGMENT TRAINING: CPT

## 2020-01-01 PROCEDURE — 92610 EVALUATE SWALLOWING FUNCTION: CPT

## 2020-01-01 PROCEDURE — 70544 MR ANGIOGRAPHY HEAD W/O DYE: CPT

## 2020-01-01 PROCEDURE — 83036 HEMOGLOBIN GLYCOSYLATED A1C: CPT

## 2020-01-01 PROCEDURE — 99233 SBSQ HOSP IP/OBS HIGH 50: CPT | Performed by: INTERNAL MEDICINE

## 2020-01-01 PROCEDURE — 36415 COLL VENOUS BLD VENIPUNCTURE: CPT

## 2020-01-01 PROCEDURE — 99231 SBSQ HOSP IP/OBS SF/LOW 25: CPT | Performed by: INTERNAL MEDICINE

## 2020-01-01 PROCEDURE — 87086 URINE CULTURE/COLONY COUNT: CPT

## 2020-01-01 PROCEDURE — 65270000029 HC RM PRIVATE

## 2020-01-01 PROCEDURE — 85730 THROMBOPLASTIN TIME PARTIAL: CPT

## 2020-01-01 PROCEDURE — 83880 ASSAY OF NATRIURETIC PEPTIDE: CPT

## 2020-01-01 PROCEDURE — 74176 CT ABD & PELVIS W/O CONTRAST: CPT

## 2020-01-01 PROCEDURE — 74011000258 HC RX REV CODE- 258: Performed by: EMERGENCY MEDICINE

## 2020-01-01 PROCEDURE — 99223 1ST HOSP IP/OBS HIGH 75: CPT | Performed by: INTERNAL MEDICINE

## 2020-01-01 PROCEDURE — 65660000001 HC RM ICU INTERMED STEPDOWN

## 2020-01-01 PROCEDURE — 83605 ASSAY OF LACTIC ACID: CPT

## 2020-01-01 PROCEDURE — 93880 EXTRACRANIAL BILAT STUDY: CPT

## 2020-01-01 PROCEDURE — 97161 PT EVAL LOW COMPLEX 20 MIN: CPT

## 2020-01-01 PROCEDURE — 74011250636 HC RX REV CODE- 250/636: Performed by: NURSE PRACTITIONER

## 2020-01-01 PROCEDURE — 96372 THER/PROPH/DIAG INJ SC/IM: CPT

## 2020-01-01 PROCEDURE — 97530 THERAPEUTIC ACTIVITIES: CPT

## 2020-01-01 PROCEDURE — 82553 CREATINE MB FRACTION: CPT

## 2020-01-01 PROCEDURE — 72125 CT NECK SPINE W/O DYE: CPT

## 2020-01-01 PROCEDURE — 87635 SARS-COV-2 COVID-19 AMP PRB: CPT

## 2020-01-01 PROCEDURE — 65660000000 HC RM CCU STEPDOWN

## 2020-01-01 PROCEDURE — 83690 ASSAY OF LIPASE: CPT

## 2020-01-01 PROCEDURE — 71045 X-RAY EXAM CHEST 1 VIEW: CPT

## 2020-01-01 PROCEDURE — 74011000250 HC RX REV CODE- 250: Performed by: INTERNAL MEDICINE

## 2020-01-01 PROCEDURE — 80202 ASSAY OF VANCOMYCIN: CPT

## 2020-01-01 PROCEDURE — 99232 SBSQ HOSP IP/OBS MODERATE 35: CPT | Performed by: INTERNAL MEDICINE

## 2020-01-01 PROCEDURE — 71250 CT THORAX DX C-: CPT

## 2020-01-01 PROCEDURE — 80053 COMPREHEN METABOLIC PANEL: CPT

## 2020-01-01 PROCEDURE — 84484 ASSAY OF TROPONIN QUANT: CPT

## 2020-01-01 PROCEDURE — 82607 VITAMIN B-12: CPT

## 2020-01-01 PROCEDURE — 82140 ASSAY OF AMMONIA: CPT

## 2020-01-01 PROCEDURE — 87040 BLOOD CULTURE FOR BACTERIA: CPT

## 2020-01-01 PROCEDURE — 84100 ASSAY OF PHOSPHORUS: CPT

## 2020-01-01 PROCEDURE — 83735 ASSAY OF MAGNESIUM: CPT

## 2020-01-01 PROCEDURE — 74011250636 HC RX REV CODE- 250/636: Performed by: EMERGENCY MEDICINE

## 2020-01-01 PROCEDURE — 74011000258 HC RX REV CODE- 258: Performed by: FAMILY MEDICINE

## 2020-01-01 PROCEDURE — 96365 THER/PROPH/DIAG IV INF INIT: CPT

## 2020-01-01 PROCEDURE — 93005 ELECTROCARDIOGRAM TRACING: CPT

## 2020-01-01 PROCEDURE — 93306 TTE W/DOPPLER COMPLETE: CPT | Performed by: INTERNAL MEDICINE

## 2020-01-01 PROCEDURE — 82747 ASSAY OF FOLIC ACID RBC: CPT

## 2020-01-01 PROCEDURE — 84443 ASSAY THYROID STIM HORMONE: CPT

## 2020-01-01 PROCEDURE — 74011250637 HC RX REV CODE- 250/637: Performed by: EMERGENCY MEDICINE

## 2020-01-01 PROCEDURE — C8929 TTE W OR WO FOL WCON,DOPPLER: HCPCS

## 2020-01-01 PROCEDURE — 70551 MRI BRAIN STEM W/O DYE: CPT

## 2020-01-01 PROCEDURE — 70450 CT HEAD/BRAIN W/O DYE: CPT

## 2020-01-01 PROCEDURE — 81001 URINALYSIS AUTO W/SCOPE: CPT

## 2020-01-01 PROCEDURE — 70486 CT MAXILLOFACIAL W/O DYE: CPT

## 2020-01-01 PROCEDURE — 80307 DRUG TEST PRSMV CHEM ANLYZR: CPT

## 2020-01-01 PROCEDURE — 99285 EMERGENCY DEPT VISIT HI MDM: CPT

## 2020-01-01 PROCEDURE — 85379 FIBRIN DEGRADATION QUANT: CPT

## 2020-01-01 RX ORDER — METOPROLOL SUCCINATE 25 MG/1
25 TABLET, EXTENDED RELEASE ORAL DAILY
Status: DISCONTINUED | OUTPATIENT
Start: 2020-01-01 | End: 2020-01-01 | Stop reason: HOSPADM

## 2020-01-01 RX ORDER — SODIUM POLYSTYRENE SULFONATE 15 G/60ML
15 SUSPENSION ORAL; RECTAL
Status: COMPLETED | OUTPATIENT
Start: 2020-01-01 | End: 2020-01-01

## 2020-01-01 RX ORDER — INSULIN GLARGINE 100 [IU]/ML
0.2 INJECTION, SOLUTION SUBCUTANEOUS DAILY
Status: DISCONTINUED | OUTPATIENT
Start: 2020-01-01 | End: 2020-01-01

## 2020-01-01 RX ORDER — SODIUM CHLORIDE 0.9 % (FLUSH) 0.9 %
5-40 SYRINGE (ML) INJECTION EVERY 8 HOURS
Status: DISCONTINUED | OUTPATIENT
Start: 2020-01-01 | End: 2020-01-01 | Stop reason: HOSPADM

## 2020-01-01 RX ORDER — SODIUM CHLORIDE 0.9 % (FLUSH) 0.9 %
5-40 SYRINGE (ML) INJECTION AS NEEDED
Status: DISCONTINUED | OUTPATIENT
Start: 2020-01-01 | End: 2020-01-01 | Stop reason: HOSPADM

## 2020-01-01 RX ORDER — ENOXAPARIN SODIUM 100 MG/ML
90 INJECTION SUBCUTANEOUS
Status: COMPLETED | OUTPATIENT
Start: 2020-01-01 | End: 2020-01-01

## 2020-01-01 RX ORDER — VANCOMYCIN 2 GRAM/500 ML IN 0.9 % SODIUM CHLORIDE INTRAVENOUS
2000
Status: COMPLETED | OUTPATIENT
Start: 2020-01-01 | End: 2020-01-01

## 2020-01-01 RX ORDER — DEXTROSE 50 % IN WATER (D50W) INTRAVENOUS SYRINGE
12.5-25 AS NEEDED
Status: DISCONTINUED | OUTPATIENT
Start: 2020-01-01 | End: 2020-01-01 | Stop reason: CLARIF

## 2020-01-01 RX ORDER — INSULIN GLARGINE 100 [IU]/ML
5 INJECTION, SOLUTION SUBCUTANEOUS ONCE
Status: COMPLETED | OUTPATIENT
Start: 2020-01-01 | End: 2020-01-01

## 2020-01-01 RX ORDER — SODIUM BICARBONATE IN D5W 150/1000ML
PLASTIC BAG, INJECTION (ML) INTRAVENOUS CONTINUOUS
Status: DISCONTINUED | OUTPATIENT
Start: 2020-01-01 | End: 2020-01-01

## 2020-01-01 RX ORDER — HEPARIN SODIUM 5000 [USP'U]/ML
4000 INJECTION, SOLUTION INTRAVENOUS; SUBCUTANEOUS
Status: COMPLETED | OUTPATIENT
Start: 2020-01-01 | End: 2020-01-01

## 2020-01-01 RX ORDER — MIDODRINE HYDROCHLORIDE 5 MG/1
10 TABLET ORAL
Status: DISCONTINUED | OUTPATIENT
Start: 2020-01-01 | End: 2020-01-01 | Stop reason: HOSPADM

## 2020-01-01 RX ORDER — ONDANSETRON 2 MG/ML
4 INJECTION INTRAMUSCULAR; INTRAVENOUS
Status: DISCONTINUED | OUTPATIENT
Start: 2020-01-01 | End: 2020-01-01 | Stop reason: HOSPADM

## 2020-01-01 RX ORDER — SODIUM BICARBONATE 650 MG/1
650 TABLET ORAL 2 TIMES DAILY
Status: DISCONTINUED | OUTPATIENT
Start: 2020-01-01 | End: 2020-01-01

## 2020-01-01 RX ORDER — ACETAMINOPHEN 650 MG/1
650 SUPPOSITORY RECTAL
Status: DISCONTINUED | OUTPATIENT
Start: 2020-01-01 | End: 2020-01-01 | Stop reason: HOSPADM

## 2020-01-01 RX ORDER — PROMETHAZINE HYDROCHLORIDE 25 MG/1
12.5 TABLET ORAL
Status: DISCONTINUED | OUTPATIENT
Start: 2020-01-01 | End: 2020-01-01 | Stop reason: HOSPADM

## 2020-01-01 RX ORDER — HEPARIN SODIUM 10000 [USP'U]/100ML
10-25 INJECTION, SOLUTION INTRAVENOUS
Status: DISCONTINUED | OUTPATIENT
Start: 2020-01-01 | End: 2020-01-01

## 2020-01-01 RX ORDER — HALOPERIDOL 5 MG/ML
2 INJECTION INTRAMUSCULAR
Status: DISCONTINUED | OUTPATIENT
Start: 2020-01-01 | End: 2020-01-01 | Stop reason: HOSPADM

## 2020-01-01 RX ORDER — ACETAMINOPHEN 325 MG/1
650 TABLET ORAL
Status: DISCONTINUED | OUTPATIENT
Start: 2020-01-01 | End: 2020-01-01 | Stop reason: HOSPADM

## 2020-01-01 RX ORDER — ENOXAPARIN SODIUM 100 MG/ML
90 INJECTION SUBCUTANEOUS EVERY 12 HOURS
Status: DISCONTINUED | OUTPATIENT
Start: 2020-01-01 | End: 2020-01-01

## 2020-01-01 RX ORDER — INSULIN LISPRO 100 [IU]/ML
5 INJECTION, SOLUTION INTRAVENOUS; SUBCUTANEOUS
Status: DISCONTINUED | OUTPATIENT
Start: 2020-01-01 | End: 2020-01-01 | Stop reason: HOSPADM

## 2020-01-01 RX ORDER — MORPHINE SULFATE 2 MG/ML
0.5 INJECTION, SOLUTION INTRAMUSCULAR; INTRAVENOUS
Status: DISCONTINUED | OUTPATIENT
Start: 2020-01-01 | End: 2020-01-01 | Stop reason: HOSPADM

## 2020-01-01 RX ORDER — SODIUM CHLORIDE 0.9 % (FLUSH) 0.9 %
5-10 SYRINGE (ML) INJECTION AS NEEDED
Status: DISCONTINUED | OUTPATIENT
Start: 2020-01-01 | End: 2020-01-01 | Stop reason: HOSPADM

## 2020-01-01 RX ORDER — VANCOMYCIN/0.9 % SOD CHLORIDE 1.5G/250ML
1500 PLASTIC BAG, INJECTION (ML) INTRAVENOUS EVERY 24 HOURS
Status: DISCONTINUED | OUTPATIENT
Start: 2020-01-01 | End: 2020-01-01

## 2020-01-01 RX ORDER — INSULIN GLARGINE 100 [IU]/ML
10 INJECTION, SOLUTION SUBCUTANEOUS DAILY
Status: DISCONTINUED | OUTPATIENT
Start: 2020-01-01 | End: 2020-01-01

## 2020-01-01 RX ORDER — INSULIN GLARGINE 100 [IU]/ML
15 INJECTION, SOLUTION SUBCUTANEOUS DAILY
Status: DISCONTINUED | OUTPATIENT
Start: 2020-01-01 | End: 2020-01-01

## 2020-01-01 RX ORDER — DIGOXIN 0.25 MG/ML
125 INJECTION INTRAMUSCULAR; INTRAVENOUS DAILY PRN
Status: DISCONTINUED | OUTPATIENT
Start: 2020-01-01 | End: 2020-01-01 | Stop reason: CLARIF

## 2020-01-01 RX ORDER — VANCOMYCIN 1.75 GRAM/500 ML IN 0.9 % SODIUM CHLORIDE INTRAVENOUS
1750 EVERY 24 HOURS
Status: DISCONTINUED | OUTPATIENT
Start: 2020-01-01 | End: 2020-01-01

## 2020-01-01 RX ORDER — LORAZEPAM 2 MG/ML
0.5 INJECTION INTRAMUSCULAR
Status: DISCONTINUED | OUTPATIENT
Start: 2020-01-01 | End: 2020-01-01 | Stop reason: HOSPADM

## 2020-01-01 RX ORDER — INSULIN GLARGINE 100 [IU]/ML
15 INJECTION, SOLUTION SUBCUTANEOUS DAILY
Status: DISCONTINUED | OUTPATIENT
Start: 2020-01-01 | End: 2020-01-01 | Stop reason: HOSPADM

## 2020-01-01 RX ORDER — DIGOXIN 0.25 MG/ML
125 INJECTION INTRAMUSCULAR; INTRAVENOUS DAILY PRN
Status: DISCONTINUED | OUTPATIENT
Start: 2020-01-01 | End: 2020-01-01 | Stop reason: HOSPADM

## 2020-01-01 RX ORDER — MAGNESIUM SULFATE 100 %
4 CRYSTALS MISCELLANEOUS AS NEEDED
Status: DISCONTINUED | OUTPATIENT
Start: 2020-01-01 | End: 2020-01-01 | Stop reason: SDUPTHER

## 2020-01-01 RX ORDER — DEXTROSE MONOHYDRATE 50 MG/ML
25 INJECTION, SOLUTION INTRAVENOUS CONTINUOUS
Status: DISCONTINUED | OUTPATIENT
Start: 2020-01-01 | End: 2020-01-01 | Stop reason: HOSPADM

## 2020-01-01 RX ORDER — POLYETHYLENE GLYCOL 3350 17 G/17G
17 POWDER, FOR SOLUTION ORAL DAILY PRN
Status: DISCONTINUED | OUTPATIENT
Start: 2020-01-01 | End: 2020-01-01 | Stop reason: HOSPADM

## 2020-01-01 RX ORDER — MAGNESIUM SULFATE 100 %
4 CRYSTALS MISCELLANEOUS AS NEEDED
Status: DISCONTINUED | OUTPATIENT
Start: 2020-01-01 | End: 2020-01-01 | Stop reason: HOSPADM

## 2020-01-01 RX ORDER — DEXTROSE MONOHYDRATE 100 MG/ML
0-250 INJECTION, SOLUTION INTRAVENOUS AS NEEDED
Status: DISCONTINUED | OUTPATIENT
Start: 2020-01-01 | End: 2020-01-01 | Stop reason: SDUPTHER

## 2020-01-01 RX ORDER — ENOXAPARIN SODIUM 100 MG/ML
1 INJECTION SUBCUTANEOUS EVERY 12 HOURS
Status: DISCONTINUED | OUTPATIENT
Start: 2020-01-01 | End: 2020-01-01 | Stop reason: HOSPADM

## 2020-01-01 RX ORDER — INSULIN LISPRO 100 [IU]/ML
3 INJECTION, SOLUTION INTRAVENOUS; SUBCUTANEOUS
Status: DISCONTINUED | OUTPATIENT
Start: 2020-01-01 | End: 2020-01-01

## 2020-01-01 RX ORDER — INSULIN LISPRO 100 [IU]/ML
INJECTION, SOLUTION INTRAVENOUS; SUBCUTANEOUS
Status: DISCONTINUED | OUTPATIENT
Start: 2020-01-01 | End: 2020-01-01 | Stop reason: HOSPADM

## 2020-01-01 RX ORDER — SODIUM CHLORIDE 9 MG/ML
50 INJECTION, SOLUTION INTRAVENOUS CONTINUOUS
Status: DISCONTINUED | OUTPATIENT
Start: 2020-01-01 | End: 2020-01-01

## 2020-01-01 RX ORDER — FUROSEMIDE 10 MG/ML
40 INJECTION INTRAMUSCULAR; INTRAVENOUS ONCE
Status: COMPLETED | OUTPATIENT
Start: 2020-01-01 | End: 2020-01-01

## 2020-01-01 RX ORDER — CLOPIDOGREL BISULFATE 75 MG/1
75 TABLET ORAL DAILY
Status: DISCONTINUED | OUTPATIENT
Start: 2020-01-01 | End: 2020-01-01 | Stop reason: HOSPADM

## 2020-01-01 RX ORDER — SODIUM CHLORIDE, SODIUM LACTATE, POTASSIUM CHLORIDE, CALCIUM CHLORIDE 600; 310; 30; 20 MG/100ML; MG/100ML; MG/100ML; MG/100ML
100 INJECTION, SOLUTION INTRAVENOUS CONTINUOUS
Status: DISCONTINUED | OUTPATIENT
Start: 2020-01-01 | End: 2020-01-01

## 2020-01-01 RX ORDER — QUETIAPINE FUMARATE 25 MG/1
25 TABLET, FILM COATED ORAL
Status: DISCONTINUED | OUTPATIENT
Start: 2020-01-01 | End: 2020-01-01 | Stop reason: HOSPADM

## 2020-01-01 RX ORDER — HALOPERIDOL 5 MG/ML
2 INJECTION INTRAMUSCULAR
Status: DISCONTINUED | OUTPATIENT
Start: 2020-01-01 | End: 2020-01-01

## 2020-01-01 RX ORDER — FUROSEMIDE 20 MG/1
20 TABLET ORAL DAILY
Status: DISCONTINUED | OUTPATIENT
Start: 2020-01-01 | End: 2020-01-01 | Stop reason: HOSPADM

## 2020-01-01 RX ORDER — GUAIFENESIN 100 MG/5ML
324 LIQUID (ML) ORAL
Status: COMPLETED | OUTPATIENT
Start: 2020-01-01 | End: 2020-01-01

## 2020-01-01 RX ADMIN — INSULIN LISPRO 5 UNITS: 100 INJECTION, SOLUTION INTRAVENOUS; SUBCUTANEOUS at 09:14

## 2020-01-01 RX ADMIN — SODIUM BICARBONATE 150 MEQ/1,000 ML IN DEXTROSE 5 % INTRAVENOUS: SOLUTION at 09:32

## 2020-01-01 RX ADMIN — INSULIN LISPRO 5 UNITS: 100 INJECTION, SOLUTION INTRAVENOUS; SUBCUTANEOUS at 17:18

## 2020-01-01 RX ADMIN — FUROSEMIDE 20 MG: 20 TABLET ORAL at 20:07

## 2020-01-01 RX ADMIN — ENOXAPARIN SODIUM 100 MG: 100 INJECTION SUBCUTANEOUS at 10:43

## 2020-01-01 RX ADMIN — CEFEPIME HYDROCHLORIDE 2 G: 2 INJECTION, POWDER, FOR SOLUTION INTRAVENOUS at 12:47

## 2020-01-01 RX ADMIN — SODIUM CHLORIDE, SODIUM LACTATE, POTASSIUM CHLORIDE, AND CALCIUM CHLORIDE 100 ML/HR: 600; 310; 30; 20 INJECTION, SOLUTION INTRAVENOUS at 19:00

## 2020-01-01 RX ADMIN — INSULIN LISPRO 5 UNITS: 100 INJECTION, SOLUTION INTRAVENOUS; SUBCUTANEOUS at 17:20

## 2020-01-01 RX ADMIN — MORPHINE SULFATE 0.5 MG: 2 INJECTION, SOLUTION INTRAMUSCULAR; INTRAVENOUS at 15:30

## 2020-01-01 RX ADMIN — SODIUM BICARBONATE 650 MG: 650 TABLET ORAL at 17:11

## 2020-01-01 RX ADMIN — INSULIN LISPRO 5 UNITS: 100 INJECTION, SOLUTION INTRAVENOUS; SUBCUTANEOUS at 17:19

## 2020-01-01 RX ADMIN — INSULIN LISPRO 5 UNITS: 100 INJECTION, SOLUTION INTRAVENOUS; SUBCUTANEOUS at 12:46

## 2020-01-01 RX ADMIN — QUETIAPINE FUMARATE 25 MG: 25 TABLET ORAL at 20:56

## 2020-01-01 RX ADMIN — Medication 10 ML: at 00:00

## 2020-01-01 RX ADMIN — INSULIN LISPRO 2 UNITS: 100 INJECTION, SOLUTION INTRAVENOUS; SUBCUTANEOUS at 12:41

## 2020-01-01 RX ADMIN — SODIUM CHLORIDE 50 ML/HR: 900 INJECTION, SOLUTION INTRAVENOUS at 08:04

## 2020-01-01 RX ADMIN — POTASSIUM BICARBONATE 20 MEQ: 782 TABLET, EFFERVESCENT ORAL at 17:06

## 2020-01-01 RX ADMIN — Medication 10 ML: at 03:09

## 2020-01-01 RX ADMIN — Medication 1 CAPSULE: at 09:13

## 2020-01-01 RX ADMIN — Medication 1 CAPSULE: at 08:32

## 2020-01-01 RX ADMIN — CEFEPIME 2 G: 2 INJECTION, POWDER, FOR SOLUTION INTRAVENOUS at 05:34

## 2020-01-01 RX ADMIN — INSULIN LISPRO 5 UNITS: 100 INJECTION, SOLUTION INTRAVENOUS; SUBCUTANEOUS at 08:10

## 2020-01-01 RX ADMIN — QUETIAPINE FUMARATE 25 MG: 25 TABLET ORAL at 21:54

## 2020-01-01 RX ADMIN — CLOPIDOGREL BISULFATE 75 MG: 75 TABLET ORAL at 20:56

## 2020-01-01 RX ADMIN — ENOXAPARIN SODIUM 90 MG: 100 INJECTION SUBCUTANEOUS at 22:02

## 2020-01-01 RX ADMIN — LORAZEPAM 0.5 MG: 2 INJECTION INTRAMUSCULAR; INTRAVENOUS at 18:52

## 2020-01-01 RX ADMIN — Medication 10 ML: at 22:00

## 2020-01-01 RX ADMIN — INSULIN LISPRO 3 UNITS: 100 INJECTION, SOLUTION INTRAVENOUS; SUBCUTANEOUS at 17:24

## 2020-01-01 RX ADMIN — CEFEPIME 2 G: 2 INJECTION, POWDER, FOR SOLUTION INTRAVENOUS at 05:49

## 2020-01-01 RX ADMIN — Medication 1 CAPSULE: at 09:01

## 2020-01-01 RX ADMIN — INSULIN GLARGINE 15 UNITS: 100 INJECTION, SOLUTION SUBCUTANEOUS at 08:31

## 2020-01-01 RX ADMIN — INSULIN GLARGINE 15 UNITS: 100 INJECTION, SOLUTION SUBCUTANEOUS at 09:02

## 2020-01-01 RX ADMIN — Medication 10 ML: at 00:53

## 2020-01-01 RX ADMIN — INSULIN GLARGINE 10 UNITS: 100 INJECTION, SOLUTION SUBCUTANEOUS at 17:12

## 2020-01-01 RX ADMIN — SODIUM CHLORIDE 829 ML: 900 INJECTION, SOLUTION INTRAVENOUS at 00:52

## 2020-01-01 RX ADMIN — INSULIN LISPRO 2 UNITS: 100 INJECTION, SOLUTION INTRAVENOUS; SUBCUTANEOUS at 12:51

## 2020-01-01 RX ADMIN — INSULIN LISPRO 2 UNITS: 100 INJECTION, SOLUTION INTRAVENOUS; SUBCUTANEOUS at 08:10

## 2020-01-01 RX ADMIN — HEPARIN SODIUM 4000 UNITS: 5000 INJECTION INTRAVENOUS; SUBCUTANEOUS at 13:54

## 2020-01-01 RX ADMIN — INSULIN LISPRO 3 UNITS: 100 INJECTION, SOLUTION INTRAVENOUS; SUBCUTANEOUS at 10:40

## 2020-01-01 RX ADMIN — INSULIN LISPRO 2 UNITS: 100 INJECTION, SOLUTION INTRAVENOUS; SUBCUTANEOUS at 17:13

## 2020-01-01 RX ADMIN — SODIUM CHLORIDE 1000 ML: 900 INJECTION, SOLUTION INTRAVENOUS at 00:52

## 2020-01-01 RX ADMIN — FUROSEMIDE 20 MG: 20 TABLET ORAL at 08:33

## 2020-01-01 RX ADMIN — INSULIN LISPRO 3 UNITS: 100 INJECTION, SOLUTION INTRAVENOUS; SUBCUTANEOUS at 11:06

## 2020-01-01 RX ADMIN — HALOPERIDOL LACTATE 2 MG: 5 INJECTION, SOLUTION INTRAMUSCULAR at 23:31

## 2020-01-01 RX ADMIN — INSULIN LISPRO 3 UNITS: 100 INJECTION, SOLUTION INTRAVENOUS; SUBCUTANEOUS at 08:04

## 2020-01-01 RX ADMIN — INSULIN LISPRO 2 UNITS: 100 INJECTION, SOLUTION INTRAVENOUS; SUBCUTANEOUS at 17:52

## 2020-01-01 RX ADMIN — ENOXAPARIN SODIUM 100 MG: 100 INJECTION SUBCUTANEOUS at 23:23

## 2020-01-01 RX ADMIN — Medication 10 ML: at 15:30

## 2020-01-01 RX ADMIN — Medication 10 ML: at 05:16

## 2020-01-01 RX ADMIN — INSULIN LISPRO 5 UNITS: 100 INJECTION, SOLUTION INTRAVENOUS; SUBCUTANEOUS at 17:39

## 2020-01-01 RX ADMIN — CEFEPIME 2 G: 2 INJECTION, POWDER, FOR SOLUTION INTRAVENOUS at 05:15

## 2020-01-01 RX ADMIN — CEFEPIME HYDROCHLORIDE 2 G: 2 INJECTION, POWDER, FOR SOLUTION INTRAVENOUS at 04:23

## 2020-01-01 RX ADMIN — INSULIN LISPRO 5 UNITS: 100 INJECTION, SOLUTION INTRAVENOUS; SUBCUTANEOUS at 08:04

## 2020-01-01 RX ADMIN — Medication 1 CAPSULE: at 08:11

## 2020-01-01 RX ADMIN — METOPROLOL SUCCINATE 25 MG: 25 TABLET, EXTENDED RELEASE ORAL at 09:13

## 2020-01-01 RX ADMIN — Medication 10 ML: at 21:27

## 2020-01-01 RX ADMIN — VANCOMYCIN HYDROCHLORIDE 1500 MG: 10 INJECTION, POWDER, LYOPHILIZED, FOR SOLUTION INTRAVENOUS at 02:20

## 2020-01-01 RX ADMIN — INSULIN LISPRO 2 UNITS: 100 INJECTION, SOLUTION INTRAVENOUS; SUBCUTANEOUS at 21:54

## 2020-01-01 RX ADMIN — ENOXAPARIN SODIUM 90 MG: 100 INJECTION SUBCUTANEOUS at 11:21

## 2020-01-01 RX ADMIN — SODIUM CHLORIDE 1000 ML: 900 INJECTION, SOLUTION INTRAVENOUS at 15:30

## 2020-01-01 RX ADMIN — INSULIN GLARGINE 10 UNITS: 100 INJECTION, SOLUTION SUBCUTANEOUS at 08:44

## 2020-01-01 RX ADMIN — SODIUM POLYSTYRENE SULFONATE 15 G: 15 SUSPENSION ORAL; RECTAL at 09:34

## 2020-01-01 RX ADMIN — ENOXAPARIN SODIUM 90 MG: 100 INJECTION SUBCUTANEOUS at 10:50

## 2020-01-01 RX ADMIN — ENOXAPARIN SODIUM 90 MG: 100 INJECTION SUBCUTANEOUS at 22:47

## 2020-01-01 RX ADMIN — INSULIN LISPRO 5 UNITS: 100 INJECTION, SOLUTION INTRAVENOUS; SUBCUTANEOUS at 12:45

## 2020-01-01 RX ADMIN — INSULIN LISPRO 5 UNITS: 100 INJECTION, SOLUTION INTRAVENOUS; SUBCUTANEOUS at 12:51

## 2020-01-01 RX ADMIN — Medication 10 ML: at 15:09

## 2020-01-01 RX ADMIN — CEFEPIME 2 G: 2 INJECTION, POWDER, FOR SOLUTION INTRAVENOUS at 17:06

## 2020-01-01 RX ADMIN — INSULIN LISPRO 3 UNITS: 100 INJECTION, SOLUTION INTRAVENOUS; SUBCUTANEOUS at 21:08

## 2020-01-01 RX ADMIN — HEPARIN SODIUM AND DEXTROSE 14 UNITS/KG/HR: 10000; 5 INJECTION INTRAVENOUS at 13:24

## 2020-01-01 RX ADMIN — ENOXAPARIN SODIUM 90 MG: 100 INJECTION SUBCUTANEOUS at 23:35

## 2020-01-01 RX ADMIN — INSULIN LISPRO 7 UNITS: 100 INJECTION, SOLUTION INTRAVENOUS; SUBCUTANEOUS at 10:39

## 2020-01-01 RX ADMIN — Medication 10 ML: at 14:11

## 2020-01-01 RX ADMIN — INSULIN LISPRO 5 UNITS: 100 INJECTION, SOLUTION INTRAVENOUS; SUBCUTANEOUS at 16:52

## 2020-01-01 RX ADMIN — INSULIN LISPRO 3 UNITS: 100 INJECTION, SOLUTION INTRAVENOUS; SUBCUTANEOUS at 11:54

## 2020-01-01 RX ADMIN — METOPROLOL SUCCINATE 25 MG: 25 TABLET, EXTENDED RELEASE ORAL at 10:52

## 2020-01-01 RX ADMIN — INSULIN LISPRO 2 UNITS: 100 INJECTION, SOLUTION INTRAVENOUS; SUBCUTANEOUS at 12:52

## 2020-01-01 RX ADMIN — MORPHINE SULFATE 0.5 MG: 2 INJECTION, SOLUTION INTRAMUSCULAR; INTRAVENOUS at 11:28

## 2020-01-01 RX ADMIN — Medication 1 CAPSULE: at 09:23

## 2020-01-01 RX ADMIN — INSULIN LISPRO 2 UNITS: 100 INJECTION, SOLUTION INTRAVENOUS; SUBCUTANEOUS at 17:40

## 2020-01-01 RX ADMIN — INSULIN LISPRO 5 UNITS: 100 INJECTION, SOLUTION INTRAVENOUS; SUBCUTANEOUS at 07:39

## 2020-01-01 RX ADMIN — CEFEPIME 2 G: 2 INJECTION, POWDER, FOR SOLUTION INTRAVENOUS at 17:11

## 2020-01-01 RX ADMIN — INSULIN LISPRO 2 UNITS: 100 INJECTION, SOLUTION INTRAVENOUS; SUBCUTANEOUS at 12:45

## 2020-01-01 RX ADMIN — ENOXAPARIN SODIUM 100 MG: 100 INJECTION SUBCUTANEOUS at 10:52

## 2020-01-01 RX ADMIN — INSULIN LISPRO 5 UNITS: 100 INJECTION, SOLUTION INTRAVENOUS; SUBCUTANEOUS at 12:11

## 2020-01-01 RX ADMIN — INSULIN GLARGINE 5 UNITS: 100 INJECTION, SOLUTION SUBCUTANEOUS at 15:05

## 2020-01-01 RX ADMIN — ENOXAPARIN SODIUM 90 MG: 100 INJECTION SUBCUTANEOUS at 23:44

## 2020-01-01 RX ADMIN — CEFEPIME HYDROCHLORIDE 2 G: 2 INJECTION, POWDER, FOR SOLUTION INTRAVENOUS at 14:13

## 2020-01-01 RX ADMIN — SODIUM CHLORIDE, SODIUM LACTATE, POTASSIUM CHLORIDE, AND CALCIUM CHLORIDE 100 ML/HR: 600; 310; 30; 20 INJECTION, SOLUTION INTRAVENOUS at 09:29

## 2020-01-01 RX ADMIN — CEFEPIME 2 G: 2 INJECTION, POWDER, FOR SOLUTION INTRAVENOUS at 16:47

## 2020-01-01 RX ADMIN — QUETIAPINE FUMARATE 25 MG: 25 TABLET ORAL at 22:29

## 2020-01-01 RX ADMIN — INSULIN LISPRO 5 UNITS: 100 INJECTION, SOLUTION INTRAVENOUS; SUBCUTANEOUS at 17:52

## 2020-01-01 RX ADMIN — Medication 10 ML: at 05:34

## 2020-01-01 RX ADMIN — Medication 10 ML: at 22:34

## 2020-01-01 RX ADMIN — Medication 10 ML: at 08:10

## 2020-01-01 RX ADMIN — VANCOMYCIN HYDROCHLORIDE 1750 MG: 10 INJECTION, POWDER, LYOPHILIZED, FOR SOLUTION INTRAVENOUS at 01:47

## 2020-01-01 RX ADMIN — INSULIN GLARGINE 15 UNITS: 100 INJECTION, SOLUTION SUBCUTANEOUS at 10:21

## 2020-01-01 RX ADMIN — HEPARIN SODIUM AND DEXTROSE 15 UNITS/KG/HR: 10000; 5 INJECTION INTRAVENOUS at 19:17

## 2020-01-01 RX ADMIN — Medication 10 ML: at 13:10

## 2020-01-01 RX ADMIN — HALOPERIDOL LACTATE 2 MG: 5 INJECTION, SOLUTION INTRAMUSCULAR at 01:47

## 2020-01-01 RX ADMIN — CLOPIDOGREL BISULFATE 75 MG: 75 TABLET ORAL at 21:25

## 2020-01-01 RX ADMIN — ENOXAPARIN SODIUM 100 MG: 100 INJECTION SUBCUTANEOUS at 22:29

## 2020-01-01 RX ADMIN — VANCOMYCIN HYDROCHLORIDE 2000 MG: 100 INJECTION, POWDER, LYOPHILIZED, FOR SOLUTION INTRAVENOUS at 02:06

## 2020-01-01 RX ADMIN — CLOPIDOGREL BISULFATE 75 MG: 75 TABLET ORAL at 00:55

## 2020-01-01 RX ADMIN — INSULIN LISPRO 2 UNITS: 100 INJECTION, SOLUTION INTRAVENOUS; SUBCUTANEOUS at 09:14

## 2020-01-01 RX ADMIN — CEFEPIME HYDROCHLORIDE 2 G: 2 INJECTION, POWDER, FOR SOLUTION INTRAVENOUS at 21:55

## 2020-01-01 RX ADMIN — INSULIN LISPRO 3 UNITS: 100 INJECTION, SOLUTION INTRAVENOUS; SUBCUTANEOUS at 08:46

## 2020-01-01 RX ADMIN — ENOXAPARIN SODIUM 90 MG: 100 INJECTION SUBCUTANEOUS at 11:54

## 2020-01-01 RX ADMIN — ENOXAPARIN SODIUM 90 MG: 100 INJECTION SUBCUTANEOUS at 10:55

## 2020-01-01 RX ADMIN — INSULIN GLARGINE 15 UNITS: 100 INJECTION, SOLUTION SUBCUTANEOUS at 08:10

## 2020-01-01 RX ADMIN — INSULIN LISPRO 3 UNITS: 100 INJECTION, SOLUTION INTRAVENOUS; SUBCUTANEOUS at 22:36

## 2020-01-01 RX ADMIN — VANCOMYCIN HYDROCHLORIDE 1750 MG: 10 INJECTION, POWDER, LYOPHILIZED, FOR SOLUTION INTRAVENOUS at 02:30

## 2020-01-01 RX ADMIN — Medication 10 ML: at 05:40

## 2020-01-01 RX ADMIN — CLOPIDOGREL BISULFATE 75 MG: 75 TABLET ORAL at 21:54

## 2020-01-01 RX ADMIN — INSULIN LISPRO 3 UNITS: 100 INJECTION, SOLUTION INTRAVENOUS; SUBCUTANEOUS at 07:15

## 2020-01-01 RX ADMIN — INSULIN LISPRO 5 UNITS: 100 INJECTION, SOLUTION INTRAVENOUS; SUBCUTANEOUS at 13:09

## 2020-01-01 RX ADMIN — ENOXAPARIN SODIUM 90 MG: 100 INJECTION SUBCUTANEOUS at 11:08

## 2020-01-01 RX ADMIN — INSULIN LISPRO 5 UNITS: 100 INJECTION, SOLUTION INTRAVENOUS; SUBCUTANEOUS at 17:13

## 2020-01-01 RX ADMIN — Medication 10 ML: at 22:08

## 2020-01-01 RX ADMIN — Medication 1 CAPSULE: at 10:25

## 2020-01-01 RX ADMIN — ENOXAPARIN SODIUM 90 MG: 100 INJECTION SUBCUTANEOUS at 11:24

## 2020-01-01 RX ADMIN — INSULIN LISPRO 5 UNITS: 100 INJECTION, SOLUTION INTRAVENOUS; SUBCUTANEOUS at 09:01

## 2020-01-01 RX ADMIN — INSULIN LISPRO 5 UNITS: 100 INJECTION, SOLUTION INTRAVENOUS; SUBCUTANEOUS at 12:41

## 2020-01-01 RX ADMIN — VANCOMYCIN HYDROCHLORIDE 1750 MG: 10 INJECTION, POWDER, LYOPHILIZED, FOR SOLUTION INTRAVENOUS at 03:20

## 2020-01-01 RX ADMIN — INSULIN GLARGINE 15 UNITS: 100 INJECTION, SOLUTION SUBCUTANEOUS at 09:24

## 2020-01-01 RX ADMIN — Medication 10 ML: at 07:16

## 2020-01-01 RX ADMIN — ASPIRIN 324 MG: 81 TABLET, CHEWABLE ORAL at 15:56

## 2020-01-01 RX ADMIN — INSULIN LISPRO 3 UNITS: 100 INJECTION, SOLUTION INTRAVENOUS; SUBCUTANEOUS at 08:01

## 2020-01-01 RX ADMIN — PERFLUTREN 2 ML: 6.52 INJECTION, SUSPENSION INTRAVENOUS at 10:54

## 2020-01-01 RX ADMIN — INSULIN LISPRO 2 UNITS: 100 INJECTION, SOLUTION INTRAVENOUS; SUBCUTANEOUS at 17:20

## 2020-01-01 RX ADMIN — CEFEPIME 2 G: 2 INJECTION, POWDER, FOR SOLUTION INTRAVENOUS at 04:36

## 2020-01-01 RX ADMIN — Medication 10 ML: at 05:05

## 2020-01-01 RX ADMIN — INSULIN GLARGINE 15 UNITS: 100 INJECTION, SOLUTION SUBCUTANEOUS at 09:14

## 2020-01-01 RX ADMIN — CEFEPIME 2 G: 2 INJECTION, POWDER, FOR SOLUTION INTRAVENOUS at 17:14

## 2020-01-01 RX ADMIN — VANCOMYCIN HYDROCHLORIDE 1500 MG: 10 INJECTION, POWDER, LYOPHILIZED, FOR SOLUTION INTRAVENOUS at 02:35

## 2020-01-01 RX ADMIN — Medication 10 ML: at 11:05

## 2020-01-01 RX ADMIN — INSULIN GLARGINE 15 UNITS: 100 INJECTION, SOLUTION SUBCUTANEOUS at 08:03

## 2020-01-01 RX ADMIN — METOPROLOL SUCCINATE 25 MG: 25 TABLET, EXTENDED RELEASE ORAL at 08:32

## 2020-01-01 RX ADMIN — HEPARIN SODIUM AND DEXTROSE 10 UNITS/KG/HR: 10000; 5 INJECTION INTRAVENOUS at 04:56

## 2020-01-01 RX ADMIN — Medication 10 ML: at 14:16

## 2020-01-01 RX ADMIN — VANCOMYCIN HYDROCHLORIDE 1750 MG: 10 INJECTION, POWDER, LYOPHILIZED, FOR SOLUTION INTRAVENOUS at 01:36

## 2020-01-01 RX ADMIN — INSULIN LISPRO 5 UNITS: 100 INJECTION, SOLUTION INTRAVENOUS; SUBCUTANEOUS at 08:44

## 2020-01-01 RX ADMIN — INSULIN LISPRO 5 UNITS: 100 INJECTION, SOLUTION INTRAVENOUS; SUBCUTANEOUS at 07:30

## 2020-01-01 RX ADMIN — FUROSEMIDE 40 MG: 10 INJECTION, SOLUTION INTRAMUSCULAR; INTRAVENOUS at 20:49

## 2020-01-01 RX ADMIN — INSULIN LISPRO 5 UNITS: 100 INJECTION, SOLUTION INTRAVENOUS; SUBCUTANEOUS at 17:23

## 2020-01-01 RX ADMIN — INSULIN LISPRO 2 UNITS: 100 INJECTION, SOLUTION INTRAVENOUS; SUBCUTANEOUS at 07:38

## 2020-01-01 RX ADMIN — ENOXAPARIN SODIUM 90 MG: 100 INJECTION SUBCUTANEOUS at 15:58

## 2020-01-01 RX ADMIN — CLOPIDOGREL BISULFATE 75 MG: 75 TABLET ORAL at 22:08

## 2020-01-01 RX ADMIN — METOPROLOL SUCCINATE 25 MG: 25 TABLET, EXTENDED RELEASE ORAL at 08:11

## 2020-01-01 RX ADMIN — Medication 10 ML: at 21:58

## 2020-01-01 RX ADMIN — Medication 10 ML: at 20:57

## 2020-01-01 RX ADMIN — METOPROLOL SUCCINATE 25 MG: 25 TABLET, EXTENDED RELEASE ORAL at 09:23

## 2020-01-01 RX ADMIN — CEFEPIME 2 G: 2 INJECTION, POWDER, FOR SOLUTION INTRAVENOUS at 17:19

## 2020-01-01 RX ADMIN — ENOXAPARIN SODIUM 90 MG: 100 INJECTION SUBCUTANEOUS at 22:08

## 2020-01-01 RX ADMIN — CEFEPIME 2 G: 2 INJECTION, POWDER, FOR SOLUTION INTRAVENOUS at 05:42

## 2020-01-01 RX ADMIN — ACETAMINOPHEN 650 MG: 325 TABLET ORAL at 22:08

## 2020-01-01 RX ADMIN — POTASSIUM BICARBONATE 20 MEQ: 782 TABLET, EFFERVESCENT ORAL at 09:01

## 2020-01-01 RX ADMIN — LORAZEPAM 0.5 MG: 2 INJECTION INTRAMUSCULAR; INTRAVENOUS at 12:29

## 2020-01-01 RX ADMIN — CLOPIDOGREL BISULFATE 75 MG: 75 TABLET ORAL at 21:29

## 2020-01-01 RX ADMIN — INSULIN LISPRO 3 UNITS: 100 INJECTION, SOLUTION INTRAVENOUS; SUBCUTANEOUS at 12:46

## 2020-01-01 RX ADMIN — Medication 10 ML: at 15:13

## 2020-01-01 RX ADMIN — SODIUM BICARBONATE 650 MG: 650 TABLET ORAL at 08:04

## 2020-01-01 RX ADMIN — CLOPIDOGREL BISULFATE 75 MG: 75 TABLET ORAL at 20:32

## 2020-01-01 RX ADMIN — VANCOMYCIN HYDROCHLORIDE 1500 MG: 10 INJECTION, POWDER, LYOPHILIZED, FOR SOLUTION INTRAVENOUS at 02:37

## 2020-01-01 RX ADMIN — CLOPIDOGREL BISULFATE 75 MG: 75 TABLET ORAL at 21:58

## 2020-01-01 RX ADMIN — Medication 10 ML: at 07:39

## 2020-01-01 RX ADMIN — SODIUM CHLORIDE 50 ML/HR: 900 INJECTION, SOLUTION INTRAVENOUS at 08:49

## 2020-01-01 RX ADMIN — INSULIN LISPRO 5 UNITS: 100 INJECTION, SOLUTION INTRAVENOUS; SUBCUTANEOUS at 08:09

## 2020-01-01 RX ADMIN — INSULIN LISPRO 7 UNITS: 100 INJECTION, SOLUTION INTRAVENOUS; SUBCUTANEOUS at 12:16

## 2020-01-01 RX ADMIN — CEFEPIME 2 G: 2 INJECTION, POWDER, FOR SOLUTION INTRAVENOUS at 16:31

## 2020-01-01 RX ADMIN — DEXTROSE MONOHYDRATE 25 ML/HR: 5 INJECTION, SOLUTION INTRAVENOUS at 16:02

## 2020-01-01 RX ADMIN — CEFEPIME HYDROCHLORIDE 1 G: 1 INJECTION, POWDER, FOR SOLUTION INTRAMUSCULAR; INTRAVENOUS at 19:01

## 2020-01-01 RX ADMIN — ENOXAPARIN SODIUM 100 MG: 100 INJECTION SUBCUTANEOUS at 22:34

## 2020-01-01 RX ADMIN — Medication 1 CAPSULE: at 08:04

## 2020-11-12 PROBLEM — I21.4 NSTEMI (NON-ST ELEVATED MYOCARDIAL INFARCTION) (HCC): Status: ACTIVE | Noted: 2020-01-01

## 2020-11-12 NOTE — ED NOTES
Pt with grossly soiled incontinence brief on. Malodorous urine. Incontinence care provided. Clean linens and clean gown.

## 2020-11-12 NOTE — PROGRESS NOTES
Spiritual Care Assessment/Progress Note [address] 
 
 
NAME: Lauryn Guillory      MRN: [id number] AGE: 80 y.o. SEX: male Christianity Affiliation:   
Language:   
 
11/12/2020     Total Time (in minutes): 10 Spiritual Assessment begun in [address] DEP through conversation with: 
  
    []Patient        [] Family    [] Friend(s) Reason for Consult: Other (comment)(Code Stroke) Spiritual beliefs: (Please include comment if needed) 
   [] Identifies with a tiarra tradition:     
   [] Supported by a tiarra community:        
   [] Claims no spiritual orientation:       
   [] Seeking spiritual identity:            
   [] Adheres to an individual form of spirituality:       
   [x] Not able to assess:                   
 
    
Identified resources for coping:  
   [] Prayer                           
   [] Music                  [] Guided Imagery 
   [] Family/friends                 [] Pet visits [] Devotional reading                         [x] Unknown 
   [] Other:                                         
 
 
Interventions offered during this visit: (See comments for more details) Plan of Care: 
 
 [] Support spiritual and/or cultural needs  
 [] Support AMD and/or advance care planning process    
 [] Support grieving process 
 [] Coordinate Rites and/or Rituals  
 [] Coordination with community clergy [] No spiritual needs identified at this time 
 [] Detailed Plan of Care below (See Comments)  [] Make referral to Music Therapy 
[] Make referral to Pet Therapy    
[] Make referral to Addiction services 
[] Make referral to [facility] 
[] Make referral to Spiritual Care Partner 
[] No future visits requested       
[x] Follow up visits as needed Comments:  responded to Code Stroke in Emergency Room (ER). Staff with patient providing care. No family or visitors present.   EMS states patient lives alone and no family on way to hospital as far as they know. Will continue to follow up as needed and upon request as able. Visited by Rev. Aylin Sharma MDiv, [facility], [facility]  paging service: [phone])

## 2020-11-12 NOTE — ED TRIAGE NOTES
Pt comes to ED via EMS with reports of STEMI by EMS in the field. STEMI team at bedside. EKG reads A-fi. Code STEMI was cancelled. Per EMS, they were called by independent living facility after being found on the ground. Pt reports feeling hot all night. Pt reports getting up at some point and feeling weak. He reports his legs gave out and he couldn't get back up. EMS reports  Neighbor stating they heard him screaming \"all night. \" Pt denies any pain at this time. NAD noted. Pt does present with paranoia over treatment. He states \"they poison me over there and you will, too. \" Reassured pt. Pt in harkins bed and placed on [employer] monitor x 3.

## 2020-11-12 NOTE — ED PROVIDER NOTES
HPI  
 
  80y M here by EMS after being found on the ground at his place of residence (indp living). Pt states he felt hot all night. Got up at some point and felt weak then his legs gave out and he couldn't get back up. EMS was called because neighbor heard him screaming \"all night. \" Pt denies any pain. No trouble breathing. No vomiting or diarrhea. No rash or skin changes. No other complaints at this time. EMS called code STEMI based on ECG in the field. No past medical history on file. No past surgical history on file. No family history on file. Social History Socioeconomic History  Marital status: Not on file Spouse name: Not on file  Number of children: Not on file  Years of education: Not on file  Highest education level: Not on file Occupational History  Not on file Social Needs  Financial resource strain: Not on file  Food insecurity Worry: Not on file Inability: Not on file  Transportation needs Medical: Not on file Non-medical: Not on file Tobacco Use  Smoking status: Not on file Substance and Sexual Activity  Alcohol use: Not on file  Drug use: Not on file  Sexual activity: Not on file Lifestyle  Physical activity Days per week: Not on file Minutes per session: Not on file  Stress: Not on file Relationships  Social connections Talks on phone: Not on file Gets together: Not on file Attends [employer] service: Not on file Active member of club or organization: Not on file Attends meetings of clubs or organizations: Not on file Relationship status: Not on file  Intimate partner violence Fear of current or ex partner: Not on file Emotionally abused: Not on file Physically abused: Not on file Forced sexual activity: Not on file Other Topics Concern  Not on file Social History Narrative  Not on file ALLERGIES: Patient has no allergy information on record. Review of Systems Review of Systems Constitutional: (-) weight loss. HEENT: (-) stiff neck Eyes: (-) discharge. Respiratory: (-) cough. Cardiovascular: (?) syncope. Gastrointestinal: (-) blood in stool. Genitourinary: (-) hematuria. Musculoskeletal: (-) myalgias. Neurological: (-) seizure. Skin: (-) petechiae Lymph/Immunologic: (-) enlarged lymph nodes All other systems reviewed and are negative. Vitals:  
 11/12/20 1415 BP: 121/79 Pulse: (!) 104 Resp: 18 Temp: 98.3 °F (36.8 °C) SpO2: 95% Weight: 94.3 kg (207 lb 14.3 oz) Physical Exam Nursing note and vitals reviewed. Constitutional: oriented to person, place, and time. appears disheveled. No distress. Head: Normocephalic and atraumatic. Sclera anicteric Nose: No rhinorrhea Mouth/Throat: Oropharynx is clear and moist. Pharynx normal 
Eyes: Conjunctivae are normal. Pupils are equal, round, and reactive to light. Right eye exhibits no discharge. Left eye exhibits no discharge. Neck: Painless normal range of motion. Neck supple. No LAD. Cardiovascular: Normal rate, regular rhythm, normal heart sounds and intact distal pulses. Exam reveals no gallop and no friction rub. No murmur heard. Pulmonary/Chest:  No respiratory distress. No wheezes. No rales. No rhonchi. No increased work of breathing. No accessory muscle use. Good air exchange throughout. Abdominal: soft, non-tender, no rebound or guarding. No hepatosplenomegaly. Normal bowel sounds throughout. Back: no tenderness to palpation, no deformities, no CVA tenderness Extremities/Musculoskeletal: Normal range of motion. no tenderness. No edema. Distal extremities are neurovasc intact. Lymphadenopathy:   No adenopathy. Neurological:  Alert and oriented to person, place, and time. Coordination normal. CN 2-12 intact. Motor and sensory function intact. Skin: Skin is warm and dry. No rash noted. No pallor. MDM 80y M here with a fall and/or syncope last night. ECG with wide complex ECG that did not look like STEMI per cards so called off. Plan for labs, CT head/neck, and admit. Procedures ED EKG interpretation: 
Rhythm: atrial fib; and irregular. Rate (approx.): 99; Axis: left axis deviation; QRS interval: prolonged; This EKG was interpreted by Nilton Garcia MD,ED Provider. 4:15 PM 
Awaiting cards consult/input. Spoke with hospitalist who felt pt may not be appropriate for the floor. Spoke with the CCU intensivist who felt pt did not require critical care management. All asking for cards to evaluate and help determine dispo. Also with elevated WBC and lactate. Cultures sent and written for broad spectrum abx. Pt signed out to Dr. Carlos Renteria to follow-up on outstanding labs and disposition. Total critical care time (excluding procedures): 40 min The ICU and CCU attendings both evaluated the patient and denied admission. Dr. Brooklyn Zhang agreed to admit this patient if the ICU team did not. Consult is therefore placed to that service. Perfect Serve Consult for Admission 11:02 PM 
 
ED Room Number: XL50/19 Patient Name and age:  Trinh Flores 80 y.o.  male Working Diagnosis: 1. NSTEMI (non-ST elevated myocardial infarction) ([facility] [address] 2. Altered mental status, unspecified altered mental status type 3. Sepsis, due to unspecified organism, unspecified whether acute organ dysfunction present ([address] COVID-19 Suspicion:  no 
Sepsis present: Yes Reassessment needed: yes Code Status:  Full Code Readmission: no 
Isolation Requirements:  no 
Recommended Level of Care:  telemetry Department:[facility] Adult ED - [phone] Other:  ICU and CCU denied admission.  Dr. Neptali Bustillo had agreed to admit if they didn't

## 2020-11-12 NOTE — ED NOTES
5:34 PM 
Still awaiting repeat evaluation by cardiology to determine whether patient is being admitted to the floor or CCU./ Patient is stable. 6:58 PM 
This patient apparently is still not been rechecked by the cardiologist.  I have paged cardiology twice without any response. I have checked with the hospitalist and he is not spoken to him about whether the patient can go to the CCU or to the floor. If he can go to the floor, the hospitalist is willing to accept. We are attempting to find the cardiologist on call who might be able to get in touch with the cardiologist that saw the patient. 7:33 PM 
I have just spoken with Dr. Paulo Henderson in the same group with  saw this patient initially. To call she could not come back to see the patient as yet. Subsequently, the patient is noted to have an elevated white cell count at 22,000, his CK is elevated and his lactate is 3.8. Chest x-ray shows some pulmonary edema however the question is whether or not he developed some aspiration pneumonia when he fell during the night. We have not been able to get a urinalysis and cannot rule out a UTI. Beaking with Dr. Gonzalez who spoke with Dr. Edward Stewart for the hospitalist, it was felt that the patient was too sick to be put on the floor and with multiple problems especially possible sepsis, it was felt that the patient should be considered for ICU admission rather than CCU admission. The hospitalist was concerned because the wide-complex could indicate damage to the AV node from the non-STEMI and he was concerned the patient may go into complete heart block. The intensivist will be called. In looking at the patient at this time, he is somewhat confused but he follows commands. He has no focal weakness although he is quite confused. Does not appear short of breath and his sats appear unremarkable. Denies any pain.   The patient had a CT scan of his head when he first came and did not show any acute process. We are still missing a urinalysis and have asked the nurse to cath him so I can get that result and speak to the intensivist. 7:41 PM 
 
Discussed with intensivist and they will see for possible admission.

## 2020-11-12 NOTE — ED NOTES
Bedside and Verbal shift change report given to Marshal Dorado RN (oncoming nurse) by Jerel Wilks RN (offgoing nurse). Report included the following information SBAR, Kardex, ED Summary, Procedure Summary, Intake/Output, MAR, Recent Results and Cardiac Rhythm a-fib.

## 2020-11-12 NOTE — PROGRESS NOTES
Cardiology Progress Note Called for possible STEMI. Patient was found down at independent living and reports no dyspnea, chest pain. ECG shows intraventricular conduction delay but no definite STEMI. STEMI response cancelled. Will see the patient. He needs mostly ruling on trauma and potentially more assistance rather than living alone in independent living. Has residual hemiparesis from prior stroke. Given IVCD, may consider echo but his overall appears significantly debilitated suggesting conservative mgm for likely severe cardiac disease if identified.

## 2020-11-13 NOTE — PROGRESS NOTES
Pharmacist Note - Vancomycin Dosing Consult provided for this 80 y.o. male for indication of sepsis. Antibiotic regimen(s): Cefepime and Vancomycin Patient on vancomycin PTA? NO Recent Labs 20 
2245 20 
1404 WBC 21.9* 22.8* CREA  --  1.43* BUN  --  27* Frequency of BMP: daily Height: 188 cm Weight: 94 kg Est CrCl: 41 ml/min Temp (24hrs), Av.3 °F (36.8 °C), Min:98.3 °F (36.8 °C), Max:98.3 °F (36.8 °C) Cultures: blood and urine Goal trough = 15 - 20 mcg/mL Therapy will be initiated with a loading dose of 2000 mg IV x 1 to be followed by a maintenance dose of 1500 mg IV every 24 hours. Pharmacy to follow patient daily and order levels / make dose adjustments as appropriate.

## 2020-11-13 NOTE — H&P
[facility] HISTORY AND PHYSICAL Name:  Venus Cunha 
MR#:  [id number] :  [id number] ACCOUNT #:  [de-identified] ADMIT DATE:  2020 The patient was seen, evaluated and admitted by me on 2020. PRIMARY CARE PHYSICIAN:  Unknown. SOURCE OF INFORMATION:  The patient who is not a good historian because of his change in mental status. Review of ED medical records. CHIEF COMPLAINT:  Fall. HISTORY OF PRESENT ILLNESS:  This is an 80-year-old man with past medical history significant for dementia, status post CVA, and type 2 diabetes, who was in his usual state of health until the day of his presentation at the emergency room when the patient was found on the floor at the assisted living facility where the patient resides. According to report, the patient was feeling hot all night long, he got up, felt weak and his legs gave out on him, the patient collapsed to the floor and unable to get up. His neighbor heard him screaming all night long. EMS was called. When the EMS arrived at the scene, the patient's EKG shows possible ST elevation myocardial infarction. This was called to the emergency room and the patient was brought to the emergency room as a code ST-elevation myocardial infarction. When the patient arrived at the emergency room, the patient's EKG was reviewed by the cardiologist and code ST-elevation myocardial infarction was canceled. The patient has significant lab abnormalities including significant leukocytosis and elevated troponin level. The patient remained confused in the emergency room, unable to provide good history. The patient was seen by the cardiologist in the emergency room. Conservative treatment was advised. The patient was also seen by the intensivist for evaluation for ICU admission.   The patient is not a candidate for ICU admission according to the intensivist.  The patient was subsequently referred to the hospitalist service for evaluation for admission. The patient has no record of prior admission to this hospital. 
 
PAST MEDICAL HISTORY: 
1.  Status post CVA with left-sided hemiparesis. 2.  Type 2 diabetes. 3.  Dementia. ALLERGIES:  NO KNOWN DRUG ALLERGIES. MEDICATIONS:  List of home medication is not available at this time. The assisted living facility was called for the patient's home medication, and attempts to get the list of home medication was not successful. FAMILY HISTORY:  Unable to obtain because of the patient's mental status. PAST SURGICAL HISTORY:  Unable to obtain because of the patient's mental status. SOCIAL HISTORY:  Unknown because of the patient's inability to provide history. REVIEW OF SYSTEMS:  Unable to obtain because of the patient's mental status. PHYSICAL EXAMINATION: 
GENERAL APPEARANCE:  The patient appeared ill, in critical condition. VITAL SIGNS:  On arrival at the emergency room; temperature 98.3, pulse 104, respiratory rate 18, blood pressure 121/79, oxygen saturation 95% on room air. HEENT:  Head:  Normocephalic, atraumatic. Eyes:  Unable to assess eye movement but no redness, no drainage, no discharge. Ears:  Normal external ears with no evidence of drainage or deformity. Nose:  No deformity, no drainage. Mouth and Throat:  No visible oral lesion. Dry oral mucosa. NECK:  Neck is supple. No JVD, no thyromegaly. CHEST:  Clear breath sounds. No wheezing, no crackles. HEART:  Normal S1 and S2, irregularly irregular. No clinically appreciable murmur. ABDOMEN:  Soft, nontender. Normal bowel sounds. CNS:  Alert. Not oriented. Mild left-sided weakness. EXTREMITIES:  No edema. Pulses 2+ bilaterally. MUSCULOSKELETAL SYSTEM:  No obvious joint deformity or swelling. SKIN:  No active skin lesions seen in the exposed part of the body. PSYCHIATRY:  Unable to assess mood and affect. LYMPHATIC SYSTEM:  No cervical lymphadenopathy. DIAGNOSTIC DATA:  EKG shows atrial fibrillation, right bundle-branch block, and nonspecific ST and T-waves abnormalities. CT scan of the cervical spine, no acute fracture or subluxation. CT scan of the head without contrast, no acute intracranial hemorrhage. Chest x-ray, moderate pulmonary vascular congestive changes. LABORATORY DATA:  Lactic acid level 3.8. Cardiac profile:  Troponin 54.60. CK 1365. Serum alcohol level less than 10. Hematology:  WBC 22.8. Hemoglobin 16.2, hematocrit 46.3, platelets 747. Chemistry:  Sodium 137, potassium 4.7, chloride 104, CO2 of 19, glucose 377, BUN 27, creatinine 1.43, calcium 10.4, total bilirubin 1.3, ALT 52, , alkaline phosphatase 76, total protein 7.4, albumin level 3.6, globulin 3.8. Urinalysis: This is significant for moderate blood, negative nitrite, negative leukocyte esterase, negative bacteria. ASSESSMENT: 
1.  Non-ST elevation myocardial infarction. 2.  Sepsis. 3.  Persistent atrial fibrillation. 4.  Acute kidney injury. 5.  Acute rhabdomyolysis. 6.  Abnormal liver function tests. 7.  Type 2 diabetes with hyperglycemia. 8.  Syncope. 9.  Acute delirium. 8.  Fall. 11.  Dementia. 12.  Hypercalcemia. PLAN: 
1.  Non-ST elevation myocardial infarction: We will admit the patient for further evaluation and treatment. We will carry out conservative treatment as advised by the cardiologist.  The patient has been started on Plavix, continuous infusion of heparin. Echocardiogram has been ordered. We will trend troponin level. We will await further recommendation from the cardiologist. 
2.  Sepsis:  The diagnosis of sepsis is supported by significant leukocytosis, elevated lactic acid level, and tachycardia. No clear source of infection at this time. We will obtain CT scan of the chest to evaluate the patient for pneumonia as a possible source of infection.   We will also obtain a CT scan of the abdomen and pelvis to evaluate the patient for possible sources of infection. We will check lipase level. We will start the patient on vancomycin and cefepime. We will await blood culture results. Fluid therapy as per sepsis protocol. 3.  Persistent atrial fibrillation: It is not clear whether the patient has history of atrial fibrillation. We will check TSH level. We will await the results of echocardiogram.  We will also await further recommendation from the cardiologist.  The patient is on full-dose heparin. 4.  Acute kidney injury: This is most likely due to volume depletion. We will carry out fluid therapy and monitor the patient's renal function. 5.  Acute rhabdomyolysis: This is as a result of the patient being found on the floor of his apartment at the assisted living facility. We will carry out fluid therapy and monitor the patient closely. 6.  Abnormal liver function tests: We will await the results of CT scan of the abdomen and pelvis. We will check lipase level. We will monitor the patient's liver function tests. The patient may require Hepatology consult if the abnormality is persistent or getting worse. 7.  Type 2 diabetes with hyperglycemia:  The patient will be placed on sliding scale with insulin coverage. We will check hemoglobin A1c level if one has not been checked recently. 8.  Syncope: We will check orthostatic vital signs. Await the results of echocardiogram.  We will check MRI and MRA of the brain to evaluate the patient for stroke as a possible cause of syncope, especially in this patient who has had a stroke in the past.  We will also check D-dimer to evaluate the patient for thromboembolism as a possible cause of syncope. 9.  Acute delirium: This is most likely due to metabolic event. CT scan of the head is negative. We will identify and treat underlying etiological factors which include acute rhabdomyolysis, acute kidney injury and sepsis as discussed above. 8.  Fall:  The patient was found on the floor at the assisted living facility. CT scan of the head is negative. CT scan of the cervical spine did not show any acute fracture. We will await the results of CT scan of the abdomen and pelvis to evaluate the patient for fracture and intra-abdominal injury. 11.  Dementia:  The patient most likely has underlying memory impairment. We will carry out supportive treatment. We will check T80 and folic acid level. We will await the results of MRI of the brain to evaluate the patient for stroke. 12.  Hypercalcemia: We will carry out fluid therapy and repeat calcium level. The patient may require further evaluation including checking a parathyroid hormone level to evaluate the patient for primary hyperparathyroidism if there is not any significant improvement in the calcium level with hydration. OTHER ISSUES:  Code status: The patient is a full code. The patient is on full-dose heparin. Because of that, there is no need for DVT prophylaxis. FUNCTIONAL STATUS PRIOR TO ADMISSION:  The patient came from assisted living facility. The patient is ambulatory with a walker. SEPSIS REASSESSMENT COMPLETED:  The patient has normal capillary refill, improved cardiopulmonary examination, and moist mucous membrane. COVID PRECAUTION:  The patient was wearing a face mask. I was wearing a face mask and gloves for this patient's encounter. Because the patient is coming from assisted living facility, the patient will be screened for COVID-19 virus infection. MD ARASELI [email or web address] 
D:  11/13/2020 5:28 T:  11/13/2020 7:03 
JOB #:  [id number]

## 2020-11-13 NOTE — PROGRESS NOTES
Cardiology Consult/Progress Note 11/12/2020  2:06 PM  
 
Subjective: 
Talkative today however hallucinations cannot be completely ruled out. .  No c/o CP or SOB today. He denies ever having an MI.  Knows he had a stroke Assessment and PLAN 1. Acute Rhabdomyolysis 
 - found down, suspect longer than 24 hours - CK 1347, CKMB 110, index 8.2 
 -  IVF therapy 2. NSTEMI 
 -  Trop initially 54 trending down to 33. I suspect he has a high burden of obstructive severe coronary artery disease. Troponin release may be demand related 
 - EKG with Afib RVR, PVC and underlying conduction delay with bifascicular block -will not treat as STEMI given lack of ongoing chest discomfort. ST changes could represent secondary repolarization changes from intraventricular conduction delay/bifascicular block 
 - Echo demonstrates severe LV dysfunction, severe apical hypokinesis with with LV apical thrombus. 3.  Suspected sepsis in conjunction with severe LV systolic dysfunction - LAC 3.6 
 - WBC 21.9 
 - pan Cx 4. Afib 
 - Rate controlled. - Not a candidate for long-term [employer] [address]. For short-term given his LV apical thrombus with suggest 6 to 8 weeks of anticoagulation if he goes to a nursing home where he can be monitored closely. 5. MAN 
 - suspect from dehydration 
 - IVF 6. DM II 
 - A1c 8.2 
 - SSI per Primary team 
7. Dementia 
 - seems baseline 8. H/o CVA 
 - left sided affect 
 - on Plavix Mr. Shay Pandey is an 80-year-old gentleman with mild cognitive dysfunction, prior stroke with residual left sided hemiparesis who presented to the emergency room after found down by the squad.   He does not report any chest discomfort or shortness of breath at arrival.  His ECG suggests underlying intraventricular conduction delay with ST segment shifts which may suggest either remote or possible recent infarct/injury versus secondary changes to intraventricular conduction delay. Presence of elevated troponin and CK-MB index suggest likely myocardial injury. Overall this picture is suggestive of more chronic ischemic cardiomyopathy with possible superimposed acute ischemia. I suspect based on his echocardiogram that patient has severe/critical multivessel disease. At the same time I do not feel he is a good candidate for any kind of invasive therapies given his multiple comorbidities. I suggest conservative management with Plavix and heparin for now. Ideally would consider anticoagulant and aspirin at discharge. However given recent fall uncertain if he is a good candidate for long-term anticoagulation. I do not feel that patient understood our discussion regarding his cardiac condition or need for above-mentioned medical therapy. I suggested that we discussed final goals of care with him. If he ends up going in a nursing home where he can be monitored closely, will strongly recommend use of at least short-term 6 to 8 weeks of anticoagulation mostly for LV apical thrombus. [x]    High complexity decision making was performed 
[x]    Patient is at high-risk of decompensation with multiple organ involvement Referring physician Dr. Babar Jacob,: 
HPI: Alex Hardin is a 80 y.o. male who was brought to [facility] emergency room after being found down. Lucien was called by his neighbors who hold noises from his house overnight and earlier this morning. When the lucien reached the site patient was found on the floor leaning his head on the wall. He was not unconscious. He said that he was on the floor overnight and was unable to move. He has prior history of stroke with residual left hemiparesis although has been living in independent living. He denies any symptoms of syncope, chest discomfort, significant shortness of breath.   Lucien performed an EKG on the way and were concerned about possible ST elevation myocardial infarction. Hence cardiology was consulted. Investigation ECG: ECG shows atrial fibrillation with rapid ventricular rate, PVCs, underlying intraventricular conduction delay with bifascicular block pattern. There are ST segment changes which may suggest ischemia/infarction. However in the absence of any active chest discomfort I highly doubt that these reflect true MI despite ST segment being concordant in lead V2 and V3. ST segment changes in V4 are discordant and less than 5 mm. There are no obvious reciprocal changes noted. Echocardiogram: Not performed White cell count 22.8 Troponin 3 PM: 54 Total CK 1365; CK-; CK-MB index 8.2 Review of Symptoms: 
Could not be performed because of patient being unable to answer all the questions. Patient Active Problem List  
 Diagnosis Date Noted  NSTEMI (non-ST elevated [facility]) ([facility] 11/12/2020 UNKNOWN 
No past medical history on file. No past surgical history on file. Social History Socioeconomic History  Marital status:  Spouse name: Not on file  Number of children: Not on file  Years of education: Not on file  Highest education level: Not on file No family history on file. Current Facility-Administered Medications Medication Dose Route Frequency  sodium chloride (NS) flush 5-40 mL  5-40 mL IntraVENous Q8H  
 sodium chloride (NS) flush 5-40 mL  5-40 mL IntraVENous PRN  
 acetaminophen (TYLENOL) tablet 650 mg  650 mg Oral Q6H PRN Or  
 acetaminophen (TYLENOL) suppository 650 mg  650 mg Rectal Q6H PRN  polyethylene glycol (MIRALAX) packet 17 g  17 g Oral DAILY PRN  promethazine (PHENERGAN) tablet 12.5 mg  12.5 mg Oral Q6H PRN Or  
 ondansetron (ZOFRAN) injection 4 mg  4 mg IntraVENous Q6H PRN  
 lactobac ac& pc-s.therm-b.anim (SENDY Q/RISAQUAD)  1 Cap Oral DAILY  cefepime (MAXIPIME) 2 g in 0.9% sodium chloride (MBP/ADV) 100 mL MBP  2 g IntraVENous Q12H  
 insulin lispro (HUMALOG) injection   SubCUTAneous AC&HS  
 glucose chewable tablet 16 g  4 Tab Oral PRN  
 glucagon (GLUCAGEN) injection 1 mg  1 mg IntraMUSCular PRN  
 dextrose 10 % infusion 125-250 mL  125-250 mL IntraVENous PRN  Vancomycin Pharmacy Dosing   Other Rx Dosing/Monitoring  [START ON 11/14/2020] vancomycin (VANCOCIN) 1500 mg in  ml infusion  1,500 mg IntraVENous Q24H  
 lactated Ringers infusion  100 mL/hr IntraVENous CONTINUOUS  
 heparin 25,000 units in D5W 250 ml infusion  10-25 Units/kg/hr IntraVENous TITRATE  clopidogreL (PLAVIX) tablet 75 mg  75 mg Oral DAILY  sodium chloride (NS) flush 5-10 mL  5-10 mL IntraVENous PRN No current outpatient medications on file. None No Known Allergies Labs:  
Recent Results (from the past 24 hour(s)) EKG, 12 LEAD, INITIAL Collection Time: 11/12/20  2:01 PM  
Result Value Ref Range Ventricular Rate 99 BPM  
 Atrial Rate 117 BPM  
 QRS Duration 150 ms  
 Q-T Interval 398 ms QTC Calculation (Bezet) 510 ms Calculated R Axis -82 degrees Calculated T Axis 82 degrees Diagnosis Atrial fibrillation with premature ventricular or aberrantly conducted  
complexes Left axis deviation Right bundle branch block Inferior infarct , age undetermined Anterior injury pattern 
** ** ACUTE MI / STEMI ** ** No previous ECGs available Confirmed by Ethelyn Buerger, MD, Anca Watts ([id number]) on 11/12/2020 4:17:22 PM 
  
CK Collection Time: 11/12/20  2:04 PM  
Result Value Ref Range CK 1,365 (H) 39 - 308 U/L  
TROPONIN I Collection Time: 11/12/20  2:04 PM  
Result Value Ref Range Troponin-I, Qt. 54.60 (H) <0.05 ng/mL SAMPLES BEING HELD Collection Time: 11/12/20  2:04 PM  
Result Value Ref Range SAMPLES BEING HELD 1RED,1BLU,1LAV   
 COMMENT   Add-on orders for these samples will be processed based on acceptable specimen integrity and analyte stability, which may vary by analyte. ETHYL ALCOHOL Collection Time: 11/12/20  2:04 PM  
Result Value Ref Range ALCOHOL(ETHYL),SERUM <10 <10 MG/DL  
CBC WITH AUTOMATED DIFF Collection Time: 11/12/20  2:04 PM  
Result Value Ref Range WBC 22.8 (H) 4.1 - 11.1 K/uL  
 RBC 5.21 4.10 - 5.70 M/uL  
 HGB 16.2 12.1 - 17.0 g/dL HCT 46.3 36.6 - 50.3 % MCV 88.9 80.0 - 99.0 FL  
 MCH 31.1 26.0 - 34.0 PG  
 MCHC 35.0 30.0 - 36.5 g/dL  
 RDW 13.3 11.5 - 14.5 % PLATELET 427 612 - 676 K/uL MPV 12.5 8.9 - 12.9 FL  
 NRBC 0.0 0  WBC ABSOLUTE NRBC 0.00 0.00 - 0.01 K/uL NEUTROPHILS 69 32 - 75 % LYMPHOCYTES 21 12 - 49 % MONOCYTES 9 5 - 13 % EOSINOPHILS 0 0 - 7 % BASOPHILS 0 0 - 1 % IMMATURE GRANULOCYTES 1 (H) 0.0 - 0.5 % ABS. NEUTROPHILS 15.7 (H) 1.8 - 8.0 K/UL  
 ABS. LYMPHOCYTES 4.8 (H) 0.8 - 3.5 K/UL  
 ABS. MONOCYTES 2.1 (H) 0.0 - 1.0 K/UL  
 ABS. EOSINOPHILS 0.0 0.0 - 0.4 K/UL  
 ABS. BASOPHILS 0.0 0.0 - 0.1 K/UL  
 ABS. IMM. GRANS. 0.2 (H) 0.00 - 0.04 K/UL  
 DF SMEAR SCANNED    
 RBC COMMENTS NORMOCYTIC, NORMOCHROMIC METABOLIC PANEL, COMPREHENSIVE Collection Time: 11/12/20  2:04 PM  
Result Value Ref Range Sodium 137 136 - 145 mmol/L Potassium 4.7 3.5 - 5.1 mmol/L Chloride 104 97 - 108 mmol/L  
 CO2 19 (L) 21 - 32 mmol/L Anion gap 14 5 - 15 mmol/L Glucose 377 (H) 65 - 100 mg/dL BUN 27 (H) 6 - 20 MG/DL Creatinine 1.43 (H) 0.70 - 1.30 MG/DL  
 BUN/Creatinine ratio 19 12 - 20 GFR est AA 56 (L) >60 ml/min/1.73m2 GFR est non-AA 47 (L) >60 ml/min/1.73m2 Calcium 10.4 (H) 8.5 - 10.1 MG/DL Bilirubin, total 1.3 (H) 0.2 - 1.0 MG/DL  
 ALT (SGPT) 52 12 - 78 U/L  
 AST (SGOT) 313 (H) 15 - 37 U/L Alk. phosphatase 76 45 - 117 U/L Protein, total 7.4 6.4 - 8.2 g/dL Albumin 3.6 3.5 - 5.0 g/dL Globulin 3.8 2.0 - 4.0 g/dL A-G Ratio 0.9 (L) 1.1 - 2.2 CK W/ CKMB & INDEX Collection Time: 11/12/20  2:04 PM  
Result Value Ref Range CK - .6 (H) <3.6 NG/ML  
 CK-MB Index 8.2 (H) 0.0 - 2.5 CK 1,347 (H) 39 - 308 U/L  
CULTURE, BLOOD, PAIRED Collection Time: 11/12/20  2:28 PM  
 Specimen: Blood Result Value Ref Range Special Requests: NO SPECIAL REQUESTS Culture result: NO GROWTH AFTER 13 HOURS    
LACTIC ACID Collection Time: 11/12/20  2:28 PM  
Result Value Ref Range Lactic acid 3.8 (HH) 0.4 - 2.0 MMOL/L  
SARS-COV-2 Collection Time: 11/12/20  7:04 PM  
Result Value Ref Range Specimen source Nasopharyngeal    
 Specimen source Nasopharyngeal    
 COVID-19 rapid test Not detected NOTD Specimen type NP Swab URINALYSIS W/MICROSCOPIC Collection Time: 11/12/20  8:06 PM  
Result Value Ref Range Color YELLOW/STRAW Appearance CLEAR CLEAR Specific gravity 1.025    
 pH (UA) 5.0 5.0 - 8.0 Protein 100 (A) NEG mg/dL Glucose >1,000 (A) NEG mg/dL Ketone 15 (A) NEG mg/dL Bilirubin Negative NEG Blood MODERATE (A) NEG Urobilinogen 0.2 0.2 - 1.0 EU/dL Nitrites Negative NEG Leukocyte Esterase Negative NEG    
 WBC 0-4 0 - 4 /hpf  
 RBC 10-20 0 - 5 /hpf Epithelial cells FEW FEW /lpf Bacteria Negative NEG /hpf Hyaline cast 0-2 0 - 5 /lpf LACTIC ACID Collection Time: 11/12/20 10:45 PM  
Result Value Ref Range Lactic acid 3.8 (HH) 0.4 - 2.0 MMOL/L  
CBC WITH AUTOMATED DIFF Collection Time: 11/12/20 10:45 PM  
Result Value Ref Range WBC 21.9 (H) 4.1 - 11.1 K/uL  
 RBC 5.30 4. 10 - 5.70 M/uL  
 HGB 16.4 12.1 - 17.0 g/dL HCT 47.1 36.6 - 50.3 % MCV 88.9 80.0 - 99.0 FL  
 MCH 30.9 26.0 - 34.0 PG  
 MCHC 34.8 30.0 - 36.5 g/dL  
 RDW 13.4 11.5 - 14.5 % PLATELET 044 767 - 346 K/uL MPV 11.9 8.9 - 12.9 FL  
 NRBC 0.0 0  WBC ABSOLUTE NRBC 0.00 0.00 - 0.01 K/uL NEUTROPHILS 68 32 - 75 % LYMPHOCYTES 21 12 - 49 % MONOCYTES 10 5 - 13 % EOSINOPHILS 0 0 - 7 % BASOPHILS 0 0 - 1 % IMMATURE GRANULOCYTES 1 (H) 0.0 - 0.5 % ABS. NEUTROPHILS 14.9 (H) 1.8 - 8.0 K/UL  
 ABS. LYMPHOCYTES 4.6 (H) 0.8 - 3.5 K/UL  
 ABS. MONOCYTES 2.2 (H) 0.0 - 1.0 K/UL  
 ABS. EOSINOPHILS 0.0 0.0 - 0.4 K/UL  
 ABS. BASOPHILS 0.0 0.0 - 0.1 K/UL  
 ABS. IMM. GRANS. 0.2 (H) 0.00 - 0.04 K/UL  
 DF SMEAR SCANNED    
 PLATELET COMMENTS Large Platelets RBC COMMENTS NORMOCYTIC, NORMOCHROMIC    
PTT Collection Time: 11/13/20 12:59 AM  
Result Value Ref Range aPTT 29.0 22.1 - 31.0 sec  
 aPTT, therapeutic range     58.0 - 77.0 SECS  
PTT Collection Time: 11/13/20  4:22 AM  
Result Value Ref Range aPTT 22.6 22.1 - 31.0 sec  
 aPTT, therapeutic range     58.0 - 83.0 SECS  
METABOLIC PANEL, BASIC Collection Time: 11/13/20  4:22 AM  
Result Value Ref Range Sodium 139 136 - 145 mmol/L Potassium 4.0 3.5 - 5.1 mmol/L Chloride 107 97 - 108 mmol/L  
 CO2 20 (L) 21 - 32 mmol/L Anion gap 12 5 - 15 mmol/L Glucose 316 (H) 65 - 100 mg/dL BUN 36 (H) 6 - 20 MG/DL Creatinine 1.27 0.70 - 1.30 MG/DL  
 BUN/Creatinine ratio 28 (H) 12 - 20 GFR est AA >60 >60 ml/min/1.73m2 GFR est non-AA 53 (L) >60 ml/min/1.73m2 Calcium 10.0 8.5 - 10.1 MG/DL  
LACTIC ACID Collection Time: 11/13/20  4:22 AM  
Result Value Ref Range Lactic acid 3.6 (HH) 0.4 - 2.0 MMOL/L  
TSH 3RD GENERATION Collection Time: 11/13/20  4:22 AM  
Result Value Ref Range TSH 0.44 0.36 - 3.74 uIU/mL MAGNESIUM Collection Time: 11/13/20  4:22 AM  
Result Value Ref Range Magnesium 2.1 1.6 - 2.4 mg/dL LIPASE Collection Time: 11/13/20  4:22 AM  
Result Value Ref Range Lipase 45 (L) 73 - 393 U/L  
AMMONIA Collection Time: 11/13/20  4:22 AM  
Result Value Ref Range Ammonia 17 <32 UMOL/L  
NT-PRO BNP Collection Time: 11/13/20  4:22 AM  
Result Value Ref Range NT pro-BNP 15,368 (H) <450 PG/ML  
HEMOGLOBIN A1C WITH EAG Collection Time: 11/13/20  4:22 AM  
Result Value Ref Range Hemoglobin A1c 8.2 (H) 4.0 - 5.6 % Est. average glucose 189 mg/dL Intake/Output Summary (Last 24 hours) at 11/13/2020 0884 Last data filed at 11/12/2020 1931 Gross per 24 hour Intake 50 ml Output  Net 50 ml Patient Vitals for the past 24 hrs: 
 Temp Pulse Resp BP SpO2  
11/13/20 0730  (!) 101 30 137/80 94 % 11/13/20 0700  99 23 137/83 93 % 11/13/20 0630  98 23 (!) 142/101 94 % 11/13/20 0600  (!) 109 28 130/88 92 % 11/13/20 0530  (!) 105 23 131/73 98 % 11/13/20 0500  98 19 132/82 95 % 11/13/20 0430  (!) 105 30 (!) 140/83 94 % 11/13/20 0400  (!) 105 22 (!) 150/77 94 % 11/13/20 0330  (!) 103 28 110/88 96 % 11/13/20 0300  100 26 132/86 94 % 11/13/20 0230  (!) 103 (!) 31 136/77 93 % 11/13/20 0200  100 30 (!) 141/95 93 % 11/13/20 0130  (!) 102 17 (!) 144/99   
11/13/20 0100  (!) 109 22 (!) 139/98   
11/13/20 0030  (!) 112 (!) 33 128/75 95 % 11/13/20 0000  95 (!) 34 117/79 97 % 11/12/20 2330  96 26 125/82 97 % 11/12/20 2300  93 29 123/75 97 % 11/12/20 2230  (!) 101 22 (!) 143/77 92 % 11/12/20 2200  94 30 125/82 97 % 11/12/20 2124  (!) 110 20 127/80   
11/12/20 2045  95 27 (!) 146/89   
11/12/20 2040  98 (!) 34    
11/12/20 2035  97 30    
11/12/20 2030  96 16 (!) 148/86   
11/12/20 1600  97 28 134/89 97 % 11/12/20 1545  90 26 94/76 97 % 11/12/20 1415 98.3 °F (36.8 °C) (!) 104 18 121/79 95 % General:    Oriented to person place. Alert this am.  
Psychiatric:    Unable to assess Eye/ENT:      Pupils equal, No asymmetry, Conjunctival pink. Able to hear voice at normal amplitude. Evidence of trauma to the scalp Lungs:      Visibly symmetric chest expansion, No palpable tenderness. Clear to auscultation bilaterally. Heart[de-identified]     Variable S1 and S2, irregular rate. Wide split second heart sound. systolic murmur, no click, rub or gallop. No JVD, Normal palpable peripheral pulses. No cyanosis Abdomen:     Soft, non-tender. Bowel sounds normal. No masses,  No   
  organomegaly. Extremities:   Extremities normal, atraumatic, no edema. Neurologic:   Left upper and lower extremity weakness DARLENE Haro MD 
11/13/2020  
9:10 AM 
  
 
[facility] Office:   [facility] Office: 
[address] [facility]    [address] [facility] [address]     [address] [place] [address] P: [id number]    P: [phone] F: [phone]    F: [phone]

## 2020-11-13 NOTE — CONSULTS
Cardiology Consult/Progress Note 11/12/2020  2:06 PM  
 
Referring physician Dr. Nicholas Leach,: 
HPI: Colby Tena is a 80 y.o. male who was brought to [facility] emergency room after being found down. Lucien was called by his neighbors who hold noises from his house overnight and earlier this morning. When the squad reached the site patient was found on the floor leaning his head on the wall. He was not unconscious. He said that he was on the floor overnight and was unable to move. He has prior history of stroke with residual left hemiparesis although has been living in independent living. He denies any symptoms of syncope, chest discomfort, significant shortness of breath. Lucien performed an EKG on the way and were concerned about possible ST elevation myocardial infarction. Hence cardiology was consulted. Investigation ECG: ECG shows atrial fibrillation with rapid ventricular rate, PVCs, underlying intraventricular conduction delay with bifascicular block pattern. There are ST segment changes which may suggest ischemia/infarction. However in the absence of any active chest discomfort I highly doubt that these reflect true MI despite ST segment being concordant in lead V2 and V3. ST segment changes in V4 are discordant and less than 5 mm. There are no obvious reciprocal changes noted. Echocardiogram: Not performed White cell count 22.8 Troponin 3 PM: 54 Total CK 1365; CK-; CK-MB index 8.2 Assessment and PLAN 1. Patient found down at home possibly for greater than 12 hours 2. Generalized weakness 3. Prior stroke with persistent left hemiparesis 4. Concern regarding STEMI. ECG with significant intraventricular conduction delay and ST segment changes which potentially could reflect ischemia/infarction. 5.  Type 2 diabetes mellitus 6. Dementia Mr. Sadi Layne is an 80-year-old gentleman with mild cognitive dysfunction, prior stroke with residual left sided hemiparesis who presented to the emergency room after found down by the squad. He does not report any chest discomfort or shortness of breath at arrival.  His ECG suggests underlying intraventricular conduction delay with ST segment shifts which may suggest either remote or possible recent infarct/injury versus secondary changes to intraventricular conduction delay. Presence of elevated troponin and CK-MB index suggest likely myocardial injury. It is uncertain if this is primary myocardial injury or secondary to some systemic process as suggested by elevated white cell count. Nevertheless in the absence of ongoing chest discomfort and shortness of breath and with multiple comorbidities and underlying functional limitation I do not feel this patient is a good candidate for cardiac catheterization or primary PCI. STEMI response is hence canceled. If he does not have any evidence of intracranial bleeding, he may consider cautious use of heparin/plavix. Echocardiogram will be performed. Currently he does not appear to be in congestive heart failure and can lie relatively flat without orthopnea. Given his overall functional status, I suggest conservative management. [x]    High complexity decision making was performed 
[x]    Patient is at high-risk of decompensation with multiple organ involvement Review of Symptoms: 
Could not be performed because of patient being unable to answer all the questions. There are no active problems to display for this patient. No primary care provider on file. No past medical history on file. No past surgical history on file. No family history on file. Current Facility-Administered Medications Medication Dose Route Frequency  vancomycin (VANCOCIN) 2000 mg in  ml infusion  2,000 mg IntraVENous NOW No current outpatient medications on file. None No Known Allergies Labs: Recent Results (from the past 24 hour(s)) EKG, 12 LEAD, INITIAL Collection Time: 11/12/20  2:01 PM  
Result Value Ref Range Ventricular Rate 99 BPM  
 Atrial Rate 117 BPM  
 QRS Duration 150 ms  
 Q-T Interval 398 ms QTC Calculation (Bezet) 510 ms Calculated R Axis -82 degrees Calculated T Axis 82 degrees Diagnosis Atrial fibrillation with premature ventricular or aberrantly conducted  
complexes Left axis deviation Right bundle branch block Inferior infarct , age undetermined Anterior injury pattern 
** ** ACUTE MI / STEMI ** ** No previous ECGs available Confirmed by Bhavesh Arthur MD, Alejandra Jules ([id number]) on 11/12/2020 4:17:22 PM 
  
CK Collection Time: 11/12/20  2:04 PM  
Result Value Ref Range CK 1,365 (H) 39 - 308 U/L  
TROPONIN I Collection Time: 11/12/20  2:04 PM  
Result Value Ref Range Troponin-I, Qt. 54.60 (H) <0.05 ng/mL SAMPLES BEING HELD Collection Time: 11/12/20  2:04 PM  
Result Value Ref Range SAMPLES BEING HELD 1RED,1BLU,1LAV   
 COMMENT Add-on orders for these samples will be processed based on acceptable specimen integrity and analyte stability, which may vary by analyte. ETHYL ALCOHOL Collection Time: 11/12/20  2:04 PM  
Result Value Ref Range ALCOHOL(ETHYL),SERUM <10 <10 MG/DL  
CBC WITH AUTOMATED DIFF Collection Time: 11/12/20  2:04 PM  
Result Value Ref Range WBC 22.8 (H) 4.1 - 11.1 K/uL  
 RBC 5.21 4.10 - 5.70 M/uL  
 HGB 16.2 12.1 - 17.0 g/dL HCT 46.3 36.6 - 50.3 % MCV 88.9 80.0 - 99.0 FL  
 MCH 31.1 26.0 - 34.0 PG  
 MCHC 35.0 30.0 - 36.5 g/dL  
 RDW 13.3 11.5 - 14.5 % PLATELET 202 027 - 061 K/uL MPV 12.5 8.9 - 12.9 FL  
 NRBC 0.0 0  WBC ABSOLUTE NRBC 0.00 0.00 - 0.01 K/uL NEUTROPHILS 69 32 - 75 % LYMPHOCYTES 21 12 - 49 % MONOCYTES 9 5 - 13 % EOSINOPHILS 0 0 - 7 % BASOPHILS 0 0 - 1 % IMMATURE GRANULOCYTES 1 (H) 0.0 - 0.5 % ABS. NEUTROPHILS 15.7 (H) 1.8 - 8.0 K/UL ABS. LYMPHOCYTES 4.8 (H) 0.8 - 3.5 K/UL  
 ABS. MONOCYTES 2.1 (H) 0.0 - 1.0 K/UL  
 ABS. EOSINOPHILS 0.0 0.0 - 0.4 K/UL  
 ABS. BASOPHILS 0.0 0.0 - 0.1 K/UL  
 ABS. IMM. GRANS. 0.2 (H) 0.00 - 0.04 K/UL  
 DF SMEAR SCANNED    
 RBC COMMENTS NORMOCYTIC, NORMOCHROMIC METABOLIC PANEL, COMPREHENSIVE Collection Time: 11/12/20  2:04 PM  
Result Value Ref Range Sodium 137 136 - 145 mmol/L Potassium 4.7 3.5 - 5.1 mmol/L Chloride 104 97 - 108 mmol/L  
 CO2 19 (L) 21 - 32 mmol/L Anion gap 14 5 - 15 mmol/L Glucose 377 (H) 65 - 100 mg/dL BUN 27 (H) 6 - 20 MG/DL Creatinine 1.43 (H) 0.70 - 1.30 MG/DL  
 BUN/Creatinine ratio 19 12 - 20 GFR est AA 56 (L) >60 ml/min/1.73m2 GFR est non-AA 47 (L) >60 ml/min/1.73m2 Calcium 10.4 (H) 8.5 - 10.1 MG/DL Bilirubin, total 1.3 (H) 0.2 - 1.0 MG/DL  
 ALT (SGPT) 52 12 - 78 U/L  
 AST (SGOT) 313 (H) 15 - 37 U/L Alk. phosphatase 76 45 - 117 U/L Protein, total 7.4 6.4 - 8.2 g/dL Albumin 3.6 3.5 - 5.0 g/dL Globulin 3.8 2.0 - 4.0 g/dL A-G Ratio 0.9 (L) 1.1 - 2.2 CK W/ CKMB & INDEX Collection Time: 11/12/20  2:04 PM  
Result Value Ref Range CK - .6 (H) <3.6 NG/ML  
 CK-MB Index 8.2 (H) 0.0 - 2.5 CK 1,347 (H) 39 - 308 U/L  
LACTIC ACID Collection Time: 11/12/20  2:28 PM  
Result Value Ref Range Lactic acid 3.8 (HH) 0.4 - 2.0 MMOL/L  
SARS-COV-2 Collection Time: 11/12/20  7:04 PM  
Result Value Ref Range Specimen source Nasopharyngeal    
 Specimen source Nasopharyngeal    
 COVID-19 rapid test Not detected NOTD Specimen type NP Swab No intake or output data in the 24 hours ending 11/12/20 2025 Patient Vitals for the past 24 hrs: 
 Temp Pulse Resp BP SpO2  
11/12/20 1600  97 28 134/89 97 % 11/12/20 1545  90 26 94/76 97 % 11/12/20 1415 98.3 °F (36.8 °C) (!) 104 18 121/79 95 % General:    Oriented to person place. Difficult to communicate Psychiatric:    Unable to assess Eye/ENT:      Pupils equal, No asymmetry, Conjunctival pink. Able to hear voice at normal amplitude. Evidence of trauma to the scalp Lungs:      Visibly symmetric chest expansion, No palpable tenderness. Clear to auscultation bilaterally. Heart[de-identified]     Variable S1 and S2, irregular rate. Wide split second heart sound. systolic murmur, no click, rub or gallop. No JVD, Normal palpable peripheral pulses. No cyanosis Abdomen:     Soft, non-tender. Bowel sounds normal. No masses,  No   
  organomegaly. Extremities:   Extremities normal, atraumatic, no edema. Neurologic:   Left upper and lower extremity weakness Dian Marcelino MD 
11/12/2020  
8:25 PM  
 
[facility] Office:   [place] [facility] Office: 
[address]     [address] [facility] [address]     [address] [facility], [address] [place] [address] P: [id number]    P: [phone] F: [phone]    F: [phone]

## 2020-11-13 NOTE — CONSULTS
[address] INITIAL NOTE Presentation Georgina Angulo is a 80 y.o. male who presented to the ED after being found down at his independent living facility. He was noted to have an elevated troponin, abnormal EKG, elevated lactate with MAN and admitted for medical management. HX: CVA with residual left-sided hemiparesis, Type 2 Diabetes, Dementia DX: NSTEMI, AMS, Sepsis, Rhabdomyolysis TX: Cardiology consult, ECHO/CT, blood cultures with IV abox, IV resucitation Current clinical course has been uncomplicated. Diabetes: Patient has known Type two diabetes, treated with metformin PTA. Family history unknown for diabetes. Consulted by Provider for advanced diabetes nursing assessment and care, specifically related to  
  
[x] Inpatient management strategy [x] Home management assessment Diabetes-related medical history Acute complications: Hyperglycemia Neurological complications Peripheral neuropathy Microvascular disease: None Macrovascular disease Cerebral vascular accident Other associated conditions: dementia and paranoia Diabetes medication history Drug class Currently in use Discontinued Never used Biguanide Metformin 1000 mg BID    
DDP-4 inhibitor Sulfonylurea Glipizide 5mg Daily Thiazolidinedione GLP-1 RA SGLT-2 inhibitors Basal insulin Fixed Dose  Combinations Bolus insulin Subjective I take metformin.  Patient resides in independent living at [facility] His nephew is Aracelis Washington- his next of KIN/emergency contact. and resides in [place]. Wife has passed away and does not have children Nephew reports that he was moved to [facility] in September He did have a caregiver in [place] who assisted with transportation to doctor visits/ADLs.   She may have a list of his medications but otherwise, he will be trying to get in touch with his PCP in [place]. He couldn't remember who his PCP was but is looking through records at home. Per nephew, he had an abscessed tooth 2-3 weeks ago, was given a prescription for an antibiotic but nephew is unclear if he filled and took Rx. Patient unable to report home diabetes management practices. Metformin and Glipizide bottles at bedside: 
Last PCP was Meliton Jama MD 
[address] [facility], [address] [facility] 
[phone] Current inpatient information ECHO Heparin gtt CT infection r/o, IV abox and blood cx MAN: GFR improving A1C 8.2% Glucose 342 Leukocytosis (EBC 24.8) Small ketones in urine, 1000+ glucose in urine Initial anion gap 14, now 12 Lactate 3.6 Objective Physical exam 
General Confused, awake Vital Signs Visit Vitals /82 Pulse (!) 102 Temp 98.3 °F (36.8 °C) Resp 22 Ht 6' 2\" (1.88 m) Wt 93.9 kg (207 lb) SpO2 98% BMI 26.58 kg/m² Skin  Warm and dry. Acanthosis noted along neckline. Heart   Regular rate and rhythm. No murmurs, rubs or gallops Lungs  Clear to auscultation without rales or rhonchi Extremities No foot wounds Laboratory Lab Results Component Value Date/Time Hemoglobin A1c 8.2 (H) 11/13/2020 04:22 AM  
 
No results found for: LDL, LDLC, DLDLP Lab Results Component Value Date/Time Creatinine 1.34 (H) 11/13/2020 09:00 AM  
 
Lab Results Component Value Date/Time  Sodium 138 11/13/2020 09:00 AM  
 Potassium 4.4 11/13/2020 09:00 AM  
 Chloride 108 11/13/2020 09:00 AM  
 CO2 18 (L) 11/13/2020 09:00 AM  
 Anion gap 12 11/13/2020 09:00 AM  
 Glucose 367 (H) 11/13/2020 09:00 AM  
 BUN 36 (H) 11/13/2020 09:00 AM  
 Creatinine 1.34 (H) 11/13/2020 09:00 AM  
 BUN/Creatinine ratio 27 (H) 11/13/2020 09:00 AM  
 GFR est AA >60 11/13/2020 09:00 AM  
 GFR est non-AA 50 (L) 11/13/2020 09:00 AM  
 Calcium 9.4 11/13/2020 09:00 AM  
 Bilirubin, total 1.3 (H) 11/13/2020 09:00 AM  
 Alk. phosphatase 75 11/13/2020 09:00 AM  
 Protein, total 7.0 11/13/2020 09:00 AM  
 Albumin 3.1 (L) 11/13/2020 09:00 AM  
 Globulin 3.9 11/13/2020 09:00 AM  
 A-G Ratio 0.8 (L) 11/13/2020 09:00 AM  
 ALT (SGPT) 54 11/13/2020 09:00 AM  
 
Lab Results Component Value Date/Time ALT (SGPT) 54 11/13/2020 09:00 AM  
 
 
Factors affecting BG pattern Factor Dose Comments Nutrition: 
Carb-controlled meals 60 grams/meal   
Lactate 3.6 Infection Leukocytosis on IV antibiotics Other: MAN GFR 50,improvement with hydration Blood glucose pattern Assessment and Plan Nursing Diagnosis Risk for unstable blood glucose pattern Nursing Intervention Domain 5252 Decision-making Support Nursing Interventions Examined current inpatient diabetes control Explored factors facilitating and impeding inpatient management Identified self-management practices impeding diabetes control Explored corrective strategies with patient and responsible inpatient provider Informed patient of rational for insulin strategy while hospitalized Evaluation Kacy Layne \"Slava\" Janeth Benavidez is an 80year old gentleman with type two diabetes on metformin admitted after being found down in his assisted living facility and found to have hyperglycemia, rhabdomyelitis, MAN, abnormal EKG with sepsis with lactic acidosis. Cardiology consulted and IV antibiotics with fluid resuscitation initiated. At this time A1C 8.2%, likely goal for age and mental status. At this time, glucose elevated and reasonable to start low dose basal insulin. Inpatient glucose goal closer to 180. Recommendations Recommend: 
Initiate the subcutaneous insulin order set with: 
1. Low dose basal insulin: 0.2 units/kg/day. First dose now 
a. If fasting glucose under 100, reduce dose by 0.1 units/kg/day 
b. If fasting glucose over 200, increase dose by 0.1 units/kg/day 2. Correctional insulin ACHS at normal sensitivity, start at a glucose of 200 3. Adjust basal insulin to low dose bolus insulin. 0.05 units/kg/meal: 5 units with meals. Hold if patient consumes less than 50% of carbohydrates on meal tray. Can titrate daily up to 9 units daily 4. Please hold oral antihyperglycemic agents in the inpatient setting Billing Code(s) I personally reviewed chart, notes, data and current medications in the medical record, and examined the patient at bedside before making care recommendations. Thank you for including us in their care. I spent 60 minutes in direct patient care today for this patient. Time includes chart review, face to face with patient and collaboration with interdisciplinary care team. 
  
 
Cherylene Hussar, CNS [facility] Access via [address] [place] [address]

## 2020-11-13 NOTE — ED NOTES
Assumed client care. Client informed of need for urine sample by previous shift nurse. Client verbalized understanding. Sample obtained. Tolerated well.

## 2020-11-13 NOTE — PROGRESS NOTES
Hospitalist Progress Note Gioia Bernheim, MD 
Answering service: [phone] from in house phone Date of Service:  2020 NAME:  Kia De Paz :  1931 MRN:  [id number] Admission Summary: As per initial admission summary This is an 70-year-old man with past medical history significant for dementia, status post CVA, and type 2 diabetes, who was in his usual state of health until the day of his presentation at the emergency room when the patient was found on the floor at the assisted living facility where the patient resides. According to report, the patient was feeling hot all night long, he got up, felt weak and his legs gave out on him, the patient collapsed to the floor and unable to get up. His neighbor heard him screaming all night long. EMS was called. When the EMS arrived at the scene, the patient's EKG shows possible ST elevation myocardial infarction. This was called to the emergency room and the patient was brought to the emergency room as a code ST-elevation myocardial infarction. When the patient arrived at the emergency room, the patient's EKG was reviewed by the cardiologist and code ST-elevation myocardial infarction was canceled. The patient has significant lab abnormalities including significant leukocytosis and elevated troponin level. The patient remained confused in the emergency room, unable to provide good history. The patient was seen by the cardiologist in the emergency room. Conservative treatment was advised. The patient was also seen by the intensivist for evaluation for ICU admission. The patient is not a candidate for ICU admission according to the intensivist.  The patient was subsequently referred to the hospitalist service for evaluation for admission. The patient has no record of prior admission to this hospital. 
 
Interval history / Subjective: Patient seen for Follow up of NSTEMI Patient seen and examined by the bedside, Labs, images and notes reviewed Cardiology following. Unable to obtain any meaning ful history from patient. Patient with no complaints,  
Consulted palliative as well. Patient is sick. D/w emergency contact listed (Magdiel Rust). He told me that he is not poa. Talked to in detail . He was very appreciative. Told him that patient is critically sick and may need to be transferred to ICU. He mentioned that may be a DNR? But patient wants resuscitative measures. CODE status not clear. palliative consulted Assessment & Plan: 1. Non-ST elevation myocardial infarction:   
on Plavix,  
continuous infusion of heparin. Echocardiogram , · Estimated LVEF is <15%. Mildly dilated left ventricle. Decreased wall thickness. Severely and segmentally reduced systolic function. Moderate (grade 2) left ventricular diastolic dysfunction. · Large apical thrombus measuring 8 by 12 mm in size. Associated with apical severe hypokinesis. likely ischemic cardiomyopathy Not a good candidate for any invasive therapies, recommended conservative management with heparin and plavix. #LV apical thrombus May need 6-8 weeks of anticoagulation as per cardiology Not a good candidate for long term anticoagulation 2. Sepsis:   
 significant leukocytosis, elevated lactic acid level, and tachycardia. No clear source of infection at this time. CT chest , Small bilateral pleural effusions with mild bibasilar atelectasis. 
  CT scan of the abdomen and pelvis, negative for any acute changes  
 on vancomycin and cefepime. We will await blood culture results. ID consulted 3. Persistent atrial fibrillation: It is not clear whether the patient has history of atrial fibrillation. On heparin drp Cardiology following Not a good candidate for long term anticoagulation . 4.  Acute kidney injury: This is most likely due to volume depletion. We will carry out fluid therapy and monitor the patient's renal function. BMP in AM 
If continues to get wore will involve nephrology 5. Acute rhabdomyolysis: This is as a result of the patient being found on the floor of his apartment at the assisted living facility. We will carry out fluid therapy and monitor the patient closely. CK in AM 
 
6. Abnormal liver function tests: The patient may require Hepatology consult if the abnormality is persistent or getting worse. 7.  Type 2 diabetes with hyperglycemia:  
 The patient will be placed on sliding scale with insulin coverage. Added lantus 10 units . Need further adjustment. 8.  Syncope: We will check orthostatic vital signs. We will check MRI and MRA of the brain to evaluate the patient for stroke as a possible cause of syncope, especially in this patient who has had a stroke in the past.   
 
9.  Acute delirium: This is most likely due to metabolic event. CT scan of the head is negative. Will monitor for now 10. Fall:  The patient was found on the floor at the assisted living facility. CT scan of the head is negative. CT scan of the cervical spine did not show any acute fracture. PT/OT consult 11. Dementia:  The patient most likely has underlying memory impairment. We will carry out supportive treatment. We will check O67 and folic acid level. We will await the results of MRI of the brain to evaluate the patient for stroke. 12.  Hypercalcemia:   
IV fluids ENLS Code status: Full code DVT prophylaxis: on heparin drip Care Plan discussed with: Patient/Family Disposition: TBD Hospital Problems  Date Reviewed: 11/12/2020 Codes Class Noted POA * (Principal) NSTEMI (non-ST elevated myocardial infarction) ([facility] [address] ICD-10-CM: I21.4 ICD-9-CM: 410.70  11/12/2020 Yes Review of Systems: A comprehensive review of systems was negative except for that written in the HPI. Vital Signs:  
 Last 24hrs VS reviewed since prior progress note. Most recent are: 
Visit Vitals /79 Pulse (!) 109 Temp 98.1 °F (36.7 °C) Resp 25 Ht 6' 2\" (1.88 m) Wt 93.9 kg (207 lb) SpO2 95% BMI 26.58 kg/m² Intake/Output Summary (Last 24 hours) at 11/13/2020 1618 Last data filed at 11/13/2020 2342 Gross per 24 hour Intake 550 ml Output  Net 550 ml Physical Examination:  
I had a face to face encounter with this patient and independently examined them on November 13, 2020 as outlined below: 
     
Constitutional:  ill appearing patient ENT:  Oral mucous moist, oropharynx benign. Neck supple, Resp:  CTA bilaterally. No wheezing/rhonchi/rales. No accessory muscle use CV:  Regular rhythm, normal rate, no murmurs, gallops, rubs GI:  Soft, non distended, non tender. normoactive bowel sounds, no hepatosplenomegaly Musculoskeletal:  No edema, warm, 2+ pulses throughout Data Review:  
 Review and/or order of clinical lab test 
Review and/or order of tests in the radiology section of CPT Review and/or order of tests in the medicine section of CPT Labs:  
 
Recent Labs 11/13/20 
0900 11/12/20 
2245 WBC 24.8* 21.9* HGB 15.0 16.4 HCT 43.6 47.1  180 Recent Labs 11/13/20 
0900 11/13/20 0422 11/12/20 
1404  139 137  
K 4.4 4.0 4.7  107 104 CO2 18* 20* 19* BUN 36* 36* 27* CREA 1.34* 1.27 1.43* * 316* 377* CA 9.4 10.0 10.4* MG  --  2.1  --   
PHOS 2.9  --   --   
 
Recent Labs 11/13/20 
0900 11/13/20 0422 11/12/20 
1404 ALT 54  --  52 AP 75  --  76  
TBILI 1.3*  --  1.3* TP 7.0  --  7.4 ALB 3.1*  --  3.6 GLOB 3.9  --  3.8 LPSE  --  45*  --   
 
Recent Labs 11/13/20 
1200 11/13/20 
0422 11/13/20 
0059 APTT 37.2* 22.6 29.0 No results for input(s): FE, TIBC, PSAT, FERR in the last 72 hours. No results found for: FOL, RBCF No results for input(s): PH, PCO2, PO2 in the last 72 hours. Recent Labs 11/13/20 
0900 11/12/20 
1404 * 1,347*  1,365* CKNDX  --  8.2*  
TROIQ 33.70* 54.60* No results found for: CHOL, CHOLX, CHLST, CHOLV, HDL, HDLP, LDL, LDLC, DLDLP, TGLX, TRIGL, TRIGP, CHHD, CHHDX Lab Results Component Value Date/Time Glucose (POC) 319 (H) 11/13/2020 02:01 PM  
 Glucose (POC) 342 (H) 11/13/2020 12:03 PM  
 Glucose (POC) 371 (H) 11/13/2020 09:20 AM  
 
Lab Results Component Value Date/Time Color YELLOW/STRAW 11/12/2020 08:06 PM  
 Appearance CLEAR 11/12/2020 08:06 PM  
 Specific gravity 1.025 11/12/2020 08:06 PM  
 pH (UA) 5.0 11/12/2020 08:06 PM  
 Protein 100 (A) 11/12/2020 08:06 PM  
 Glucose >1,000 (A) 11/12/2020 08:06 PM  
 Ketone 15 (A) 11/12/2020 08:06 PM  
 Bilirubin Negative 11/12/2020 08:06 PM  
 Urobilinogen 0.2 11/12/2020 08:06 PM  
 Nitrites Negative 11/12/2020 08:06 PM  
 Leukocyte Esterase Negative 11/12/2020 08:06 PM  
 Epithelial cells FEW 11/12/2020 08:06 PM  
 Bacteria Negative 11/12/2020 08:06 PM  
 WBC 0-4 11/12/2020 08:06 PM  
 RBC 10-20 11/12/2020 08:06 PM  
 
 
 
Medications Reviewed:  
 
Current Facility-Administered Medications Medication Dose Route Frequency  sodium chloride (NS) flush 5-40 mL  5-40 mL IntraVENous Q8H  
 sodium chloride (NS) flush 5-40 mL  5-40 mL IntraVENous PRN  
 acetaminophen (TYLENOL) tablet 650 mg  650 mg Oral Q6H PRN Or  
 acetaminophen (TYLENOL) suppository 650 mg  650 mg Rectal Q6H PRN  polyethylene glycol (MIRALAX) packet 17 g  17 g Oral DAILY PRN  promethazine (PHENERGAN) tablet 12.5 mg  12.5 mg Oral Q6H PRN Or  
 ondansetron (ZOFRAN) injection 4 mg  4 mg IntraVENous Q6H PRN  
 lactobac ac& pc-s.therm-b.anim (SENDY Q/RISAQUAD)  1 Cap Oral DAILY  cefepime (MAXIPIME) 2 g in 0.9% sodium chloride (MBP/ADV) 100 mL MBP  2 g IntraVENous Q12H  
 insulin lispro (HUMALOG) injection   SubCUTAneous AC&HS  
 glucose chewable tablet 16 g  4 Tab Oral PRN  
 glucagon (GLUCAGEN) injection 1 mg  1 mg IntraMUSCular PRN  
 dextrose 10 % infusion 125-250 mL  125-250 mL IntraVENous PRN  Vancomycin Pharmacy Dosing   Other Rx Dosing/Monitoring  [START ON 11/14/2020] vancomycin (VANCOCIN) 1500 mg in  ml infusion  1,500 mg IntraVENous Q24H  
 lactated Ringers infusion  100 mL/hr IntraVENous CONTINUOUS  
 insulin lispro (HUMALOG) injection 3 Units  3 Units SubCUTAneous TIDAC  
 [START ON 11/14/2020] insulin glargine (LANTUS) injection 19 Units  0.2 Units/kg SubCUTAneous DAILY  heparin 25,000 units in D5W 250 ml infusion  10-25 Units/kg/hr IntraVENous TITRATE  clopidogreL (PLAVIX) tablet 75 mg  75 mg Oral DAILY  sodium chloride (NS) flush 5-10 mL  5-10 mL IntraVENous PRN No current outpatient medications on file.  
 
______________________________________________________________________ EXPECTED LENGTH OF STAY: - - - 
ACTUAL LENGTH OF STAY:          1 Addie Amezcua MD

## 2020-11-13 NOTE — CONSULTS
[facility] 
 
ICU TEAM CONSULT Note Name: Cristy Jessica : 1931 MRN: [id number] Date: 2020 HPI:  
80 M resident of independent/assisted living facility brought to ED after being found on the floor. He has little recall of the event leading up to his transport to the ED. In the ED, CT head revealed no abnormalities. He was noted to be in AF (chronicity unknown) and with wide QRS complex. There are no prior ECGs for comparison. Initial trop I was 54.60 and cardiology has seen him. They have no plans for intervention and note suggests conservative management. Hospitalists were called for admission and they suggested ICU due to ECG findings. Presently, the patient denies pain and dyspnea. He does note feeling warm all day on the day prior to admission. He has no known Covid exposures. Past Medical History:  
 
 has no past medical history on file. He is unable to reliably relate his PMH or meds to me Past Surgical History:  
 
 has no past surgical history on file. Home Medications:  
 
Prior to Admission medications Not on File Allergies/Social/Family History: No Known Allergies Social History Tobacco Use  Smoking status: Not on file Substance Use Topics  Alcohol use: Not on file No family history on file. Review of Systems: A comprehensive review of systems was negative. Objective:  
Vital Signs: 
Visit Vitals /80 Pulse (!) 110 Temp 98.3 °F (36.8 °C) Resp 20 Wt 94.3 kg (207 lb 14.3 oz) SpO2 97% O2 Device: Room air Temp (24hrs), Av.3 °F (36.8 °C), Min:98.3 °F (36.8 °C), Max:98.3 °F (36.8 °C) Intake/Output:  
 
Intake/Output Summary (Last 24 hours) at 2020 Last data filed at 2020 Gross per 24 hour Intake 50 ml Output  Net 50 ml Physical Exam: 
 
General:  alert, cooperative, no distress, appears stated age, confused Neurologic:  no focal deficits Lymphatic:  Cervical, supraclavicular, and axillary nodes normal.  
Neck:  No JVD Lungs:  clear to auscultation bilaterally Abdomen:  soft, non-tender. Bowel sounds normal. No masses,  no organomegaly Cardiovascular:  IRIR, rate low 100s, fixed split S2, no M noted LABS AND  DATA: Personally reviewed Recent Labs 11/12/20 
1404 WBC 22.8* HGB 16.2 HCT 46.3  Recent Labs 11/12/20 
1404   
K 4.7  CO2 19*  
BUN 27* CREA 1.43* * CA 10.4* Recent Labs 11/12/20 
1404 AP 76  
TP 7.4 ALB 3.6 GLOB 3.8 No results for input(s): INR, PTP, APTT, INREXT in the last 72 hours. No results for input(s): PHI, PCO2I, PO2I, FIO2I in the last 72 hours. Recent Labs 11/12/20 
1404 CPK 1,347*  1,365* CKMB 110.6* TROIQ 54.60* Hem MEDS: Reviewed Chest X-Ray: Reviewed by me. Agree with following interpretation CXR Results  (Last 48 hours)  
          
 11/12/20 1734  XR CHEST PORT Final result Impression:  IMPRESSION: Moderate pulmonary vascular congestive changes. Narrative:  INDICATION: Chest pain. Elevated lactate. Portable AP semiupright view of the chest.  
   
There is no prior study for direct comparison. Cardiomediastinal silhouette is within normal limits. There is mild pulmonary  
vascular prominence. There is bilateral interstitial edema. No pleural fluid is  
seen. There is no pneumothorax. Active Problem List:  
Advanced age Atrial fibrillation of unknown chronicity RBBB of unknown chronicity Elevated trop I c/w recent NSTEMI Mildly elevated lactate Leukocytosis - no overt infection identified Suggestion of mild CHF on CXR Respiratory status stable on RA 
BP stable Hyperglycemia Mild renal insuff - unknown baseline ICU Assessment/ Comprehensive Plan of Care: CCM team was asked to see him to consider admission to ICU.  The above problem list notwithstanding, I do not see a definite indication for ICU level of care. Cardiology has seen him and suggests conservative mgmt of NSTEMI. Stepdown/intermediate level of care seem appropriate given concern about rhythm, etc 
 
He lives in aggregate setting so R/O of Covid is warranted Given advanced age, discussion of goals of care is warranted. I have spoken with the hospitalist who will evaluate for admission Susie Houser 
11/12/2020 9:54 PM

## 2020-11-13 NOTE — PROGRESS NOTES
Day #1 of Vancomycin Indication:  ?sepsis of unknown etiology 
-small bilateral pleural effusions with mild bibasilar atelectasis on CT chest/abd 
-CXR with mod pulm vasc congestive changes 
-leukocytosis is persistently without neutrophilia since presentation 
-afebrile, tachycardic, satting well on RA, some tachypnea 
-persistent lactic acid elevation despite fluid resuscitation - AST elevated but other liver enzymes WNL as well as ammonia 
-AMS 
-UA (-)ve pyuria, bacteria Current regimen:  vanc 1.5g q24h Abx regimen:  vanc/cefepime ID Following ?: NO 
Concomitant nephrotoxic drugs (requires more frequent monitoring): None Frequency of BMP?: daily Recent Labs 20 
0900 20 
0422 20 
2245 20 
1404 WBC 24.8*  --  21.9* 22.8* CREA 1.34* 1.27  --  1.43* BUN 36* 36*  --  27* Est CrCl: 40-45 ml/min; UO: -- ml/kg/hr Temp (24hrs), Av.3 °F (36.8 °C), Min:98.3 °F (36.8 °C), Max:98.3 °F (36.8 °C) Cultures:  
 blood: NG x1 day; prelim  urine: in process (of note, urine without pyuria or bacteria) Goal trough = 15 - 20 mcg/mL Recent trough history (date/time/level/dose/action taken): 
N/a Plan: Continue current regimen. Will assess dosing once at steady state if empiric therapy remains active at that time. Lencho Vogel, [id number]

## 2020-11-13 NOTE — ED NOTES
Pt showed VTACH on CM, rn went in to assess pt, ER MD at bedside, repeat EKG repeated, unchanged from previous.  Will notify admitting MD

## 2020-11-13 NOTE — PROGRESS NOTES
I spoke to Dr Fanny Dawkins and tried to reach Dr Stevan Griffith. He had seen the patient and recommended medical management I spoke to Dr Genie Ahumada who thinks patient should be in intensive care bed. I agree because of high troponin and multiple organ failure

## 2020-11-14 NOTE — PROGRESS NOTES
2230: TRANSFER - IN REPORT: 
 
Verbal report received from Trey Caballero (name) on Lucy Chin  being received from ED (unit) for routine progression of care Report consisted of patients Situation, Background, Assessment and  
Recommendations(SBAR). Information from the following report(s) SBAR, Kardex, Intake/Output, Recent Results, Med Rec Status and Cardiac Rhythm A FIb was reviewed with the receiving nurse. Opportunity for questions and clarification was provided. 2322: Assessment completed upon patients arrival to unit and care assumed. 1195: Critical CO2 of 14 received from lab. Provider made aware. Orders received. Will continue to monitor. 0730: Bedside and Verbal shift change report given to ** (oncoming nurse) by Cornelia Quinn (offgoing nurse). Report included the following information SBAR, Kardex, Intake/Output, Recent Results, Med Rec Status and Cardiac Rhythm Afib R BBB.

## 2020-11-14 NOTE — PROGRESS NOTES
Hospitalist Progress Note Alice Julio MD 
Answering service: [phone] from in house phone Date of Service:  2020 NAME:  Humphrey Das :  1931 MRN:  [id number] Admission Summary: As per initial admission summary This is an 77-year-old man with past medical history significant for dementia, status post CVA, and type 2 diabetes, who was in his usual state of health until the day of his presentation at the emergency room when the patient was found on the floor at the assisted living facility where the patient resides. According to report, the patient was feeling hot all night long, he got up, felt weak and his legs gave out on him, the patient collapsed to the floor and unable to get up. His neighbor heard him screaming all night long. EMS was called. When the EMS arrived at the scene, the patient's EKG shows possible ST elevation myocardial infarction. This was called to the emergency room and the patient was brought to the emergency room as a code ST-elevation myocardial infarction. When the patient arrived at the emergency room, the patient's EKG was reviewed by the cardiologist and code ST-elevation myocardial infarction was canceled. The patient has significant lab abnormalities including significant leukocytosis and elevated troponin level. The patient remained confused in the emergency room, unable to provide good history. The patient was seen by the cardiologist in the emergency room. Conservative treatment was advised. The patient was also seen by the intensivist for evaluation for ICU admission. The patient is not a candidate for ICU admission according to the intensivist.  The patient was subsequently referred to the hospitalist service for evaluation for admission. The patient has no record of prior admission to this hospital. 
 
Interval history / Subjective: Patient seen for Follow up of NSTEMI Patient seen and examined by the bedside, Labs, images and notes reviewed Cardiology following. Unable to obtain any meaning ful history from patient. Patient with no complaints,  
Consulted palliative as well. Patient is sick. D/w emergency contact listed (Billie Marrero). He told me that he is not poa. Talked to in detail . He was very appreciative. Told him that patient is critically sick and may need to be transferred to ICU. He mentioned that may be a DNR? But patient wants resuscitative measures. CODE status not clear. palliative consulted 11/14: looks more alert today. Apparently having some hallucinations. Blood culture positive for gram positive cocci 1/2. Already on vancomycin. Worsening leucocytosis. ID consult pending Assessment & Plan: 1. Non-ST elevation myocardial infarction:   
on Plavix,  
continuous infusion of heparin. Echocardiogram , · Estimated LVEF is <15%. Mildly dilated left ventricle. Decreased wall thickness. Severely and segmentally reduced systolic function. Moderate (grade 2) left ventricular diastolic dysfunction. · Large apical thrombus measuring 8 by 12 mm in size. Associated with apical severe hypokinesis. likely ischemic cardiomyopathy Not a good candidate for any invasive therapies, recommended conservative management with heparin and plavix. #LV apical thrombus May need 6-8 weeks of anticoagulation as per cardiology Not a good candidate for long term anticoagulation Will appreciate cardiology recommendations before discharge regarding oral anticoagulation 2. Sepsis:  worsening  
 significant leukocytosis, elevated lactic acid level, and tachycardia. No clear source of infection at this time. CT chest , Small bilateral pleural effusions with mild bibasilar atelectasis. 
  CT scan of the abdomen and pelvis, negative for any acute changes on vancomycin and cefepime. We will await blood culture results. ID consulted , pending, blood culture gram positive cocci 1/2 Patient already on vancomycin 3. Persistent atrial fibrillation: It is not clear whether the patient has history of atrial fibrillation. On heparin drp Cardiology following Not a good candidate for long term anticoagulation . 4.  Acute kidney injury: This is most likely due to volume depletion. We will carry out fluid therapy and monitor the patient's renal function. BMP in AM 
If continues to get wore will involve nephrology 5. Acute rhabdomyolysis: This is as a result of the patient being found on the floor of his apartment at the assisted living facility. We will carry out fluid therapy and monitor the patient closely. . Improving 6. Abnormal liver function tests: The patient may require Hepatology consult if the abnormality is persistent or getting worse. 7.  Type 2 diabetes with hyperglycemia:  
 The patient will be placed on sliding scale with insulin coverage. Added lantus 15 units . Need further adjustment. 8.  Syncope: We will check orthostatic vital signs. We will check MRI and MRA of the brain to evaluate the patient for stroke as a possible cause of syncope, especially in this patient who has had a stroke in the past.   
 
9.  Acute delirium: This is most likely due to metabolic event. CT scan of the head is negative. Will monitor for now 10. Fall:  The patient was found on the floor at the assisted living facility. CT scan of the head is negative. CT scan of the cervical spine did not show any acute fracture. PT/OT consult 11. Dementia:  The patient most likely has underlying memory impairment. We will carry out supportive treatment. We will check W42 and folic acid level. We will await the results of MRI of the brain to evaluate the patient for stroke. 12.  Hypercalcemia:   
IV fluids Code status: Full code DVT prophylaxis: on heparin drip Care Plan discussed with: Patient/Family Disposition: TBD Hospital Problems  Date Reviewed: 11/12/2020 Codes Class Noted POA * (Principal) NSTEMI (non-ST elevated myocardial infarction) ([facility] [address] ICD-10-CM: I21.4 ICD-9-CM: 410.70  11/12/2020 Yes Review of Systems: A comprehensive review of systems was negative except for that written in the HPI. Vital Signs:  
 Last 24hrs VS reviewed since prior progress note. Most recent are: 
Visit Vitals /74 (BP 1 Location: Left arm, BP Patient Position: At rest) Pulse (!) 107 Temp 97.7 °F (36.5 °C) Resp 22 Ht 6' 2\" (1.88 m) Wt 88.8 kg (195 lb 12.3 oz) SpO2 97% BMI 25.14 kg/m² Intake/Output Summary (Last 24 hours) at 11/14/2020 1322 Last data filed at 11/14/2020 1391 Gross per 24 hour Intake 2059.19 ml Output  Net 2059.19 ml Physical Examination:  
I had a face to face encounter with this patient and independently examined them on November 14, 2020 as outlined below: 
     
Constitutional:  ill appearing patient , alert today ENT:  Oral mucous moist, oropharynx benign. Neck supple, Resp:  CTA bilaterally. No wheezing/rhonchi/rales. No accessory muscle use CV:  Regular rhythm, normal rate, no murmurs, gallops, rubs GI:  Soft, non distended, non tender. normoactive bowel sounds, no hepatosplenomegaly Musculoskeletal:  No edema, warm, 2+ pulses throughout Data Review:  
 Review and/or order of clinical lab test 
Review and/or order of tests in the radiology section of CPT Review and/or order of tests in the medicine section of CPT Labs:  
 
Recent Labs 11/14/20 0225 11/13/20 
0900 WBC 27.4* 24.8* HGB 15.0 15.0  
HCT 44.7 45.9  43.6  176 Recent Labs 11/14/20 
0850 11/14/20 0225 11/13/20 
0900 11/13/20 
0422  138 138 139  
K 4.1 4.2 4.4 4.0  
* 113* 108 107 CO2 19* 14* 18* 20* BUN 49* 46* 36* 36* CREA 1.15 1.11 1.34* 1.27  
* 207* 367* 316* CA 9.6 9.4 9.4 10.0 MG  --   --   --  2.1 PHOS  --   --  2.9  --   
 
Recent Labs 11/13/20 
0900 11/13/20 
0422 11/12/20 
1404 ALT 54  --  52 AP 75  --  76  
TBILI 1.3*  --  1.3* TP 7.0  --  7.4 ALB 3.1*  --  3.6 GLOB 3.9  --  3.8 LPSE  --  45*  --   
 
Recent Labs 11/14/20 
0850 11/14/20 
0225 11/13/20 
1826 APTT 62.6* 59.8* 55.6* No results for input(s): FE, TIBC, PSAT, FERR in the last 72 hours. No results found for: FOL, RBCF No results for input(s): PH, PCO2, PO2 in the last 72 hours. Recent Labs 11/14/20 0225 11/13/20 
1537 11/13/20 
0900 11/12/20 
1404 *  --  690* 1,347*  1,365* CKNDX  --   --   --  8.2*  
TROIQ  --  21.60* 33.70* 54.60* No results found for: CHOL, CHOLX, CHLST, CHOLV, HDL, HDLP, LDL, LDLC, DLDLP, TGLX, TRIGL, TRIGP, CHHD, CHHDX Lab Results Component Value Date/Time Glucose (POC) 212 (H) 11/14/2020 11:00 AM  
 Glucose (POC) 220 (H) 11/14/2020 06:14 AM  
 Glucose (POC) 263 (H) 11/13/2020 10:28 PM  
 Glucose (POC) 300 (H) 11/13/2020 04:36 PM  
 Glucose (POC) 319 (H) 11/13/2020 02:01 PM  
 
Lab Results Component Value Date/Time Color YELLOW/STRAW 11/12/2020 08:06 PM  
 Appearance CLEAR 11/12/2020 08:06 PM  
 Specific gravity 1.025 11/12/2020 08:06 PM  
 pH (UA) 5.0 11/12/2020 08:06 PM  
 Protein 100 (A) 11/12/2020 08:06 PM  
 Glucose >1,000 (A) 11/12/2020 08:06 PM  
 Ketone 15 (A) 11/12/2020 08:06 PM  
 Bilirubin Negative 11/12/2020 08:06 PM  
 Urobilinogen 0.2 11/12/2020 08:06 PM  
 Nitrites Negative 11/12/2020 08:06 PM  
 Leukocyte Esterase Negative 11/12/2020 08:06 PM  
 Epithelial cells FEW 11/12/2020 08:06 PM  
 Bacteria Negative 11/12/2020 08:06 PM  
 WBC 0-4 11/12/2020 08:06 PM  
 RBC 10-20 11/12/2020 08:06 PM  
 
 
 
Medications Reviewed:  
 
Current Facility-Administered Medications Medication Dose Route Frequency  sodium bicarbonate tablet 650 mg  650 mg Oral BID  
 0.9% sodium chloride infusion  50 mL/hr IntraVENous CONTINUOUS  
 enoxaparin (LOVENOX) injection 90 mg  90 mg SubCUTAneous Q12H  
 sodium chloride (NS) flush 5-40 mL  5-40 mL IntraVENous Q8H  
 sodium chloride (NS) flush 5-40 mL  5-40 mL IntraVENous PRN  
 acetaminophen (TYLENOL) tablet 650 mg  650 mg Oral Q6H PRN Or  
 acetaminophen (TYLENOL) suppository 650 mg  650 mg Rectal Q6H PRN  polyethylene glycol (MIRALAX) packet 17 g  17 g Oral DAILY PRN  promethazine (PHENERGAN) tablet 12.5 mg  12.5 mg Oral Q6H PRN Or  
 ondansetron (ZOFRAN) injection 4 mg  4 mg IntraVENous Q6H PRN  
 lactobac ac& pc-s.therm-b.anim (SENDY Q/RISAQUAD)  1 Cap Oral DAILY  cefepime (MAXIPIME) 2 g in 0.9% sodium chloride (MBP/ADV) 100 mL MBP  2 g IntraVENous Q12H  
 insulin lispro (HUMALOG) injection   SubCUTAneous AC&HS  
 glucose chewable tablet 16 g  4 Tab Oral PRN  
 glucagon (GLUCAGEN) injection 1 mg  1 mg IntraMUSCular PRN  
 dextrose 10 % infusion 125-250 mL  125-250 mL IntraVENous PRN  Vancomycin Pharmacy Dosing   Other Rx Dosing/Monitoring  vancomycin (VANCOCIN) 1500 mg in  ml infusion  1,500 mg IntraVENous Q24H  
 insulin lispro (HUMALOG) injection 5 Units  5 Units SubCUTAneous TIDAC  insulin glargine (LANTUS) injection 10 Units  10 Units SubCUTAneous DAILY  clopidogreL (PLAVIX) tablet 75 mg  75 mg Oral DAILY  sodium chloride (NS) flush 5-10 mL  5-10 mL IntraVENous PRN  
 
______________________________________________________________________ EXPECTED LENGTH OF STAY: - - - 
ACTUAL LENGTH OF STAY:          2 Vinny Bush MD

## 2020-11-14 NOTE — PROGRESS NOTES
Lovenox Monitoring Indication: ACS Recent Labs 11/14/20 
0850 11/14/20 
0225 11/13/20 
0900  11/12/20 
2245 HGB  --  15.0 15.0  --  16.4 PLT  --  167 176  --  180 CREA 1.15 1.11 1.34*   < >  --   
 < > = values in this interval not displayed. Current Weight: 88.8 kg (bed scale) Est. CrCl = 51 ml/min Current Dose: 80 mg subcutaneously every 12 hours. Plan: Change to 90 mg Q12H (1 mg/kg/dose).

## 2020-11-14 NOTE — ED NOTES
Bedside and Verbal shift change report given to Junior Sharma RN (oncoming nurse) by Roberta Masterson RN (offgoing nurse). Report included the following information SBAR, Kardex, ED Summary, MAR, Recent Results and Cardiac Rhythm Afib.

## 2020-11-14 NOTE — CONSULTS
Infectious Disease Consult Today's Date: 2020 Admit Date: 2020 Impression:  
· Leukocytosis · Lactic acidosis · Tooth pain, without obvious inflammation Plan: · Work-up any fevers · Continue antibiotic therapy · Consider CT facial bones to further eval tooth pain Anti-infectives: · Vancomycin · Cefepime Subjective:  
Date of Consultation:  2020 Referring Physician: Dr Katty Mckeon Patient is a 80 y.o. male admitted after being found down for undetermined length of time. He had problems with rhabdomyolysis. He has a persistent lactic acidosis and elevated white count of uncertain cause. He is a bit demented and unclear in his history. He does complain of some tooth pain but has no other real complaints such as cough, shortness of breath, etc. is on IV antibiotics and we are asked to see him in consultation Patient Active Problem List  
Diagnosis Code  NSTEMI (non-ST elevated myocardial infarction) ([facility] [address] I21.4 No past medical history on file. No family history on file. Social History Tobacco Use  Smoking status: Not on file Substance Use Topics  Alcohol use: Not on file No past surgical history on file. Prior to Admission medications Not on File No Known Allergies Review of Systems:  Ears, Nose, Mouth, Throat, and Face: positive for Upper right tooth pain Objective:  
 
Visit Vitals /71 (BP 1 Location: Left arm, BP Patient Position: At rest) Pulse 96 Temp 98.4 °F (36.9 °C) Resp 22 Ht 6' 2\" (1.88 m) Wt 88.8 kg (195 lb 12.3 oz) SpO2 97% BMI 25.14 kg/m² Temp (24hrs), Av.2 °F (36.8 °C), Min:97.4 °F (36.3 °C), Max:99 °F (37.2 °C) Lines:  Peripheral IV:    
 
Physical Exam:  Lungs:  clear to auscultation bilaterally Heart:  regular rate and rhythm Abdomen:  soft, non-tender. Bowel sounds normal. No masses,  no organomegaly No obvious erythema of upper teeth Data Review: CBC: 
 Recent Labs 11/14/20 
0225 11/13/20 
0900 11/12/20 
2245 WBC 27.4* 24.8* 21.9*  
GRANS 64 72 68 MONOS 10 9 10 EOS 0 0 0 ANEU 17.5* 17.9* 14.9* ABL 6.9* 4.5* 4.6* HGB 15.0 15.0 16.4 HCT 44.7 45.9  43.6 47.1  176 180 BMP: 
Recent Labs 11/14/20 
0850 11/14/20 
0225 11/13/20 
0900 CREA 1.15 1.11 1.34* BUN 49* 46* 36*  138 138  
K 4.1 4.2 4.4  
* 113* 108 CO2 19* 14* 18* AGAP 10 11 12 * 207* 367* LFTS: 
Recent Labs 11/13/20 
0900 11/12/20 
1404 TBILI 1.3* 1.3* ALT 54 52 AP 75 76  
TP 7.0 7.4 ALB 3.1* 3.6 Microbiology:  
 
All Micro Results Procedure Component Value Units Date/Time CULTURE, BLOOD [[id number]]  (Abnormal) Collected:  11/12/20 0045 Order Status:  Completed Specimen:  Blood Updated:  11/14/20 3263 Special Requests: NO SPECIAL REQUESTS Culture result:    
  GRAM POSITIVE COCCI IN CLUSTERS GROWING IN 1 OF 2 BOTTLES DRAWN SITE = NO SITE INDICATED  
     
      
  REMAINING BOTTLE(S) HAS/HAVE NO GROWTH SO FAR  
     
 CULTURE, BLOOD, PAIRED [[id number]] Collected:  11/12/20 1428 Order Status:  Completed Specimen:  Blood Updated:  11/14/20 0553 Special Requests: NO SPECIAL REQUESTS Culture result: NO GROWTH 2 DAYS     
 CULTURE, BLOOD [[id number]] Collected:  11/12/20 0422 Order Status:  Completed Specimen:  Blood Updated:  11/14/20 0553 Special Requests: NO SPECIAL REQUESTS Culture result: NO GROWTH AFTER 22 HOURS     
 CULTURE, URINE [[id number]] Collected:  11/12/20 2006 Order Status:  Completed Specimen:  Cath Urine Updated:  11/13/20 1706 Special Requests: NO SPECIAL REQUESTS Culture result: No growth (<1,000 CFU/ML) Imaging:  
Reviewed Signed By: Malik Ruth MD   
 November 14, 2020

## 2020-11-14 NOTE — PROGRESS NOTES
1930: Bedside and Verbal shift change report given to Bri Cardona (oncoming nurse) by Jil Camargo (offgoing nurse). Report included the following information SBAR, Kardex, ED Summary, Procedure Summary, MAR, Recent Results and Cardiac Rhythm A Fib. 
 
2000: Shift assessment completed. TRANSFER - OUT REPORT: 
 
Verbal report given to Sybil Do 1460  (name) on Kia De Paz  being transferred to CVSU(unit) for routine progression of care Report consisted of patients Situation, Background, Assessment and  
Recommendations(SBAR). Information from the following report(s) SBAR, Kardex, ED Summary, Procedure Summary, MAR, Recent Results and Cardiac Rhythm A FIb was reviewed with the receiving nurse. Lines:  
Peripheral IV 11/12/20 Left Hand (Active) Site Assessment Clean, dry, & intact 11/13/20 2000 Phlebitis Assessment 0 11/13/20 2000 Infiltration Assessment 0 11/13/20 2000 Dressing Status Clean, dry, & intact 11/13/20 2000 Dressing Type Transparent;Tape 11/13/20 2000 Hub Color/Line Status Infusing 11/13/20 2000 Action Taken Open ports on tubing capped 11/13/20 2000 Alcohol Cap Used Yes 11/13/20 2000 Peripheral IV 11/13/20 Right Wrist (Active) Site Assessment Clean, dry, & intact 11/13/20 2000 Phlebitis Assessment 0 11/13/20 2000 Infiltration Assessment 0 11/13/20 2000 Dressing Status Clean, dry, & intact 11/13/20 2000 Dressing Type Transparent;Tape 11/13/20 2000 Hub Color/Line Status Infusing 11/13/20 2000 Action Taken Open ports on tubing capped 11/13/20 2000 Alcohol Cap Used Yes 11/13/20 2000 Opportunity for questions and clarification was provided. Patient transported with: 
 Monitor Patient's medications from home Registered Nurse

## 2020-11-14 NOTE — CONSULTS
Psychiatric Consultation DATE OF EVALUATION:  11/14/2020 ATTENDING PHYSICIAN:  Dr. Chasity Gerard REASON FOR REFERRAL:   
Psychiatric consultation requested for Georgina Angulo to evaluate patient for dementia with behaviors disturbance, Jewish preoccupations and increased irritability. HISTORY OF PRESENT ILLNESS: 
The patient, Georgina Angulo, is a 80 y.o.,  male who was admitted to the inpatient medical unit after being found down at his independent living facility with possible cardiac event. The patient has a history of hemiparesis from a prior stroke. It is appears that patient was previously alert and oriented, as by his previous notes, he was living alone and answering questions appropriately. He has most recently had increased confusion with Jewish preoccupation. He states that this is a conspiracy against him. PAST MEDICAL HISTORY Patient Active Problem List  
 Diagnosis Date Noted  NSTEMI (non-ST elevated myocardial infarction) ([facility] [address] 11/12/2020 No past medical history on file. MEDICATION PRIOR TO ADMISSION: 
Prior to Admission medications Not on File ALLERGIES: 
No Known Allergies SOCIAL HISTORY: 
Social History Tobacco Use  Smoking status: Not on file Substance Use Topics  Alcohol use: Not on file FAMILY HISTORY: 
No family history on file. REVIEW OF SYSTEMS: 
The patient denies complaints of sherrie, depression, anxiety, delusions, A/V hallucinations, suicidal ideation, homicidal ideation. Medical review of systems mainly considered negative. MENTAL STATUS EXAM: 
Sensorium  oriented to time, place and person Orientation person, place, situation and year Relations unreliable Eye Contact appropriate Appearance:  age appropriate Motor Behavior:  within normal limits Speech:  normal pitch, normal volume and non-pressured Vocabulary average Thought Process: illogical and tangential  
 Thought Content not internally preoccupied Suicidal ideations none Homicidal ideations none Mood:  anxious and irritable Affect:  mood-congruent Memory recent  impaired Memory remote:  impaired Concentration:  impaired Abstraction:  concrete Insight:  poor Reliability poor Judgment:  poor ASSESSEMENT: 
The patient is a 80 y.o. @reji@  male with a history of recent cardiac event and CVA. He was previously living alone in an independent living facility where he was found down and brought to the hospital with possible STEMI. He has since gotten progressively worse with Altered Mental status that involves Mormon preoccupations. It is likely that these symptoms are relations to his medical pathology and not psychiatry, though he could be experiencing some delirium as this is a sudden on set in symptoms. PLAN: 
Continuously redirect and reorient patient when necessary Utilize PRN antipsychotic medication only if necessary for agitation. Thank you for the opportunity to participate in the care of your patient. SIGNED:   
Polo Miranda NP 
11/14/2020

## 2020-11-14 NOTE — PROGRESS NOTES
0730: Bedside and Verbal shift change report given to Jo (oncoming nurse) by Oc Giraldo (offgoing nurse). Report included the following information SBAR, Kardex, ED Summary, OR Summary, Procedure Summary, Intake/Output, MAR, Recent Results, and Cardiac Rhythm AFIB . 1930: Bedside and Verbal shift change report given to Bucky Mejía (oncoming nurse) by Jo (offgoing nurse). Report included the following information SBAR, Kardex, ED Summary, OR Summary, Procedure Summary, Intake/Output, Recent Results, Med Rec Status, Cardiac Rhythm Afib w/ BBB and Alarm Parameters . Problem: Pressure Injury - Risk of 
Goal: *Prevention of pressure injury Description: Document Eric Scale and appropriate interventions in the flowsheet. Outcome: Progressing Towards Goal 
Note: Pressure Injury Interventions: 
Sensory Interventions: Assess changes in LOC, Avoid rigorous massage over bony prominences, Check visual cues for pain, Keep linens dry and wrinkle-free, Minimize linen layers Moisture Interventions: Absorbent underpads, Limit adult briefs Activity Interventions: Pressure redistribution bed/mattress(bed type) Mobility Interventions: Assess need for specialty bed, Pressure redistribution bed/mattress (bed type), PT/OT evaluation Nutrition Interventions: Document food/fluid/supplement intake, Offer support with meals,snacks and hydration Friction and Shear Interventions: HOB 30 degrees or less, Lift sheet, Minimize layers Problem: Falls - Risk of 
Goal: *Absence of Falls Description: Document Richard Merlin Fall Risk and appropriate interventions in the flowsheet. Outcome: Progressing Towards Goal 
Note: Fall Risk Interventions: 
Mobility Interventions: Bed/chair exit alarm, Communicate number of staff needed for ambulation/transfer Mentation Interventions: Adequate sleep, hydration, pain control, Bed/chair exit alarm, Door open when patient unattended, Evaluate medications/consider consulting pharmacy, Familiar objects from home, More frequent rounding, Room close to nurse's station Elimination Interventions: Bed/chair exit alarm, Call light in reach, Toileting schedule/hourly rounds History of Falls Interventions: Bed/chair exit alarm, Consult care management for discharge planning, Door open when patient unattended, Room close to nurse's station

## 2020-11-14 NOTE — PROGRESS NOTES
Cardiology Consult/Progress Note 11/12/2020  2:06 PM  
 
Subjective: 
Talkative today however hallucinations cannot be completely ruled out. .  No c/o CP or SOB today. He denies ever having an MI.  Knows he had a stroke He talks to me today about god and satan talking to him and \"trying not to listen when one of them talk\" He want to know here I have been and what I have been doing Seems to think I am someone else. I have asked psychiatry to see him as his hallucinations seem vivid For now cardiac issues stable, no chest pain, no dyspnea and bp stable. Assessment and PLAN 1. Acute Rhabdomyolysis 
 - found down, suspect longer than 24 hours - CK 1347, CKMB 110, index 8.2 
 -  IVF therapy 2. NSTEMI 
 -  Trop initially 54 trending down to 33. I suspect he has a high burden of obstructive severe coronary artery disease. Troponin release may be demand related 
 - EKG with Afib RVR, PVC and underlying conduction delay with bifascicular block -will not treat as STEMI given lack of ongoing chest discomfort. ST changes could represent secondary repolarization changes from intraventricular conduction delay/bifascicular block 
 - Echo demonstrates severe LV dysfunction, severe apical hypokinesis with with LV apical thrombus. 3.  Suspected sepsis in conjunction with severe LV systolic dysfunction - LAC 3.6 
 - WBC 21.9 
 - pan Cx 4. Afib 
 - Rate controlled. - Not a candidate for long-term [facility]. For short-term given his LV apical thrombus with suggest 6 to 8 weeks of anticoagulation if he goes to a nursing home where he can be monitored closely. 5. MAN 
 - suspect from dehydration 
 - IVF 6. DM II 
 - A1c 8.2 
 - SSI per Primary team 
7. Dementia 
 - seems baseline 8. H/o CVA 
 - left sided affect 
 - on Plavix Mr. Mehul Perez is an 27-year-old gentleman with mild cognitive dysfunction, prior stroke with residual left sided hemiparesis who presented to the emergency room after found down by the squad. He does not report any chest discomfort or shortness of breath at arrival.  His ECG suggests underlying intraventricular conduction delay with ST segment shifts which may suggest either remote or possible recent infarct/injury versus secondary changes to intraventricular conduction delay. Presence of elevated troponin and CK-MB index suggest likely myocardial injury. Overall this picture is suggestive of more chronic ischemic cardiomyopathy with possible superimposed acute ischemia. I suspect based on his echocardiogram that patient has severe/critical multivessel disease. At the same time I do not feel he is a good candidate for any kind of invasive therapies given his multiple comorbidities. I suggest conservative management with Plavix and heparin for now. Ideally would consider anticoagulant and aspirin at discharge. However given recent fall uncertain if he is a good candidate for long-term anticoagulation. I do not feel that patient understood our discussion regarding his cardiac condition or need for above-mentioned medical therapy. I suggested that we discussed final goals of care with him. If he ends up going in a nursing home where he can be monitored closely, will strongly recommend use of at least short-term 6 to 8 weeks of anticoagulation mostly for LV apical thrombus. [x]    High complexity decision making was performed 
[x]    Patient is at high-risk of decompensation with multiple organ involvement Referring physician Dr. Kai Lorenz,: 
HPI: Neli Marrufo is a 80 y.o. male who was brought to [facility] emergency room after being found down. Lucien was called by his neighbors who hold noises from his house overnight and earlier this morning.   When the squad reached the site patient was found on the floor leaning his head on the wall. He was not unconscious. He said that he was on the floor overnight and was unable to move. He has prior history of stroke with residual left hemiparesis although has been living in independent living. He denies any symptoms of syncope, chest discomfort, significant shortness of breath. Squad performed an EKG on the way and were concerned about possible ST elevation myocardial infarction. Hence cardiology was consulted. Investigation ECG: ECG shows atrial fibrillation with rapid ventricular rate, PVCs, underlying intraventricular conduction delay with bifascicular block pattern. There are ST segment changes which may suggest ischemia/infarction. However in the absence of any active chest discomfort I highly doubt that these reflect true MI despite ST segment being concordant in lead V2 and V3. ST segment changes in V4 are discordant and less than 5 mm. There are no obvious reciprocal changes noted. Echocardiogram: Not performed White cell count 22.8 Troponin 3 PM: 54 Total CK 1365; CK-; CK-MB index 8.2 Review of Symptoms: 
Could not be performed because of patient being unable to answer all the questions. Patient Active Problem List  
 Diagnosis Date Noted  NSTEMI (non-ST elevated myocardial infarction) ([facility] [address] 11/12/2020 UNKNOWN 
No past medical history on file. No past surgical history on file. Social History Socioeconomic History  Marital status:  Spouse name: Not on file  Number of children: Not on file  Years of education: Not on file  Highest education level: Not on file No family history on file. Current Facility-Administered Medications Medication Dose Route Frequency  sodium bicarbonate tablet 650 mg  650 mg Oral BID  
 0.9% sodium chloride infusion  50 mL/hr IntraVENous CONTINUOUS  
 enoxaparin (LOVENOX) injection 90 mg  90 mg SubCUTAneous Q12H  sodium chloride (NS) flush 5-40 mL  5-40 mL IntraVENous Q8H  
 sodium chloride (NS) flush 5-40 mL  5-40 mL IntraVENous PRN  
 acetaminophen (TYLENOL) tablet 650 mg  650 mg Oral Q6H PRN Or  
 acetaminophen (TYLENOL) suppository 650 mg  650 mg Rectal Q6H PRN  polyethylene glycol (MIRALAX) packet 17 g  17 g Oral DAILY PRN  promethazine (PHENERGAN) tablet 12.5 mg  12.5 mg Oral Q6H PRN Or  
 ondansetron (ZOFRAN) injection 4 mg  4 mg IntraVENous Q6H PRN  
 lactobac ac& pc-s.therm-b.anim (SENDY Q/RISAQUAD)  1 Cap Oral DAILY  cefepime (MAXIPIME) 2 g in 0.9% sodium chloride (MBP/ADV) 100 mL MBP  2 g IntraVENous Q12H  
 insulin lispro (HUMALOG) injection   SubCUTAneous AC&HS  
 glucose chewable tablet 16 g  4 Tab Oral PRN  
 glucagon (GLUCAGEN) injection 1 mg  1 mg IntraMUSCular PRN  
 dextrose 10 % infusion 125-250 mL  125-250 mL IntraVENous PRN  Vancomycin Pharmacy Dosing   Other Rx Dosing/Monitoring  vancomycin (VANCOCIN) 1500 mg in  ml infusion  1,500 mg IntraVENous Q24H  
 insulin lispro (HUMALOG) injection 5 Units  5 Units SubCUTAneous TIDAC  insulin glargine (LANTUS) injection 10 Units  10 Units SubCUTAneous DAILY  clopidogreL (PLAVIX) tablet 75 mg  75 mg Oral DAILY  sodium chloride (NS) flush 5-10 mL  5-10 mL IntraVENous PRN None No Known Allergies Labs:  
Recent Results (from the past 24 hour(s)) PTT Collection Time: 11/13/20 12:00 PM  
Result Value Ref Range aPTT 37.2 (H) 22.1 - 31.0 sec  
 aPTT, therapeutic range     58.0 - 77.0 SECS  
GLUCOSE, POC Collection Time: 11/13/20 12:03 PM  
Result Value Ref Range Glucose (POC) 342 (H) 65 - 100 mg/dL Performed by Evy Parkinson GLUCOSE, POC Collection Time: 11/13/20  2:01 PM  
Result Value Ref Range Glucose (POC) 319 (H) 65 - 100 mg/dL Performed by Evy Parkinson DUPLEX CAROTID BILATERAL Collection Time: 11/13/20  3:18 PM  
Result Value Ref Range Right cca dist sys 40.8 cm/s Right CCA dist hardin 0.0 cm/s Right CCA prox sys 36.9 cm/s Right CCA prox hardin 0.0 cm/s Right eca sys 48.4 cm/s RIGHT EXTERNAL CAROTID ARTERY D 5.64 cm/s Right ICA dist sys 42.2 cm/s Right ICA dist hardin 7.1 cm/s Right ICA mid sys 53.1 cm/s Right ICA mid hardin 12.4 cm/s Right ICA prox sys 66.3 cm/s Right ICA prox hardin 12.4 cm/s Right vertebral sys 62.2 cm/s RIGHT VERTEBRAL ARTERY D 6.68 cm/s Right ICA/CCA sys 1.6 Left CCA dist sys 36.3 cm/s Left CCA dist hardin 6.2 cm/s Left CCA prox sys 41.3 cm/s Left CCA prox hardin 6.4 cm/s Left ECA sys 47.8 cm/s LEFT EXTERNAL CAROTID ARTERY D 0.00 cm/s Left ICA dist sys 57.8 cm/s Left ICA dist hardin 11.6 cm/s Left ICA mid sys 54.4 cm/s Left ICA mid hardin 19.5 cm/s Left ICA prox sys 67.4 cm/s Left ICA prox hardin 17.7 cm/s Left vertebral sys 46.5 cm/s LEFT VERTEBRAL ARTERY D 0.00 cm/s Left ICA/CCA sys 1.86 EKG, 12 LEAD, INITIAL Collection Time: 11/13/20  3:30 PM  
Result Value Ref Range Ventricular Rate 109 BPM  
 Atrial Rate 108 BPM  
 QRS Duration 162 ms Q-T Interval 388 ms QTC Calculation (Bezet) 522 ms Calculated R Axis -77 degrees Calculated T Axis 82 degrees Diagnosis Undetermined rhythm Left axis deviation Right bundle branch block Inferior infarct (cited on or before 12-NOV-2020) Anterior injury pattern 
** ** ACUTE MI / STEMI ** ** When compared with ECG of 12-NOV-2020 14:01, 
Current undetermined rhythm precludes rhythm comparison, needs review TROPONIN I Collection Time: 11/13/20  3:37 PM  
Result Value Ref Range Troponin-I, Qt. 21.60 (H) <0.05 ng/mL GLUCOSE, POC Collection Time: 11/13/20  4:36 PM  
Result Value Ref Range Glucose (POC) 300 (H) 65 - 100 mg/dL Performed by [facility] LACTIC ACID Collection Time: 11/13/20  5:14 PM  
Result Value Ref Range Lactic acid 4.2 (HH) 0.4 - 2.0 MMOL/L  
PTT Collection Time: 11/13/20  6:26 PM  
Result Value Ref Range aPTT 55.6 (H) 22.1 - 31.0 sec  
 aPTT, therapeutic range     58.0 - 77.0 SECS  
GLUCOSE, POC Collection Time: 11/13/20 10:28 PM  
Result Value Ref Range Glucose (POC) 263 (H) 65 - 100 mg/dL Performed by Juan F Arnett   
PTT Collection Time: 11/14/20  2:25 AM  
Result Value Ref Range aPTT 59.8 (H) 22.1 - 31.0 sec  
 aPTT, therapeutic range     58.0 - 99.3 SECS  
METABOLIC PANEL, BASIC Collection Time: 11/14/20  2:25 AM  
Result Value Ref Range Sodium 138 136 - 145 mmol/L Potassium 4.2 3.5 - 5.1 mmol/L Chloride 113 (H) 97 - 108 mmol/L  
 CO2 14 (LL) 21 - 32 mmol/L Anion gap 11 5 - 15 mmol/L Glucose 207 (H) 65 - 100 mg/dL BUN 46 (H) 6 - 20 MG/DL Creatinine 1.11 0.70 - 1.30 MG/DL  
 BUN/Creatinine ratio 41 (H) 12 - 20 GFR est AA >60 >60 ml/min/1.73m2 GFR est non-AA >60 >60 ml/min/1.73m2 Calcium 9.4 8.5 - 10.1 MG/DL  
CBC WITH AUTOMATED DIFF Collection Time: 11/14/20  2:25 AM  
Result Value Ref Range WBC 27.4 (H) 4.1 - 11.1 K/uL  
 RBC 4.88 4.10 - 5.70 M/uL  
 HGB 15.0 12.1 - 17.0 g/dL HCT 44.7 36.6 - 50.3 % MCV 91.6 80.0 - 99.0 FL  
 MCH 30.7 26.0 - 34.0 PG  
 MCHC 33.6 30.0 - 36.5 g/dL  
 RDW 13.9 11.5 - 14.5 % PLATELET 379 455 - 639 K/uL MPV 12.2 8.9 - 12.9 FL  
 NRBC 0.1 (H) 0  WBC ABSOLUTE NRBC 0.02 (H) 0.00 - 0.01 K/uL NEUTROPHILS 64 32 - 75 % LYMPHOCYTES 25 12 - 49 % MONOCYTES 10 5 - 13 % EOSINOPHILS 0 0 - 7 % BASOPHILS 0 0 - 1 % IMMATURE GRANULOCYTES 1 (H) 0.0 - 0.5 % ABS. NEUTROPHILS 17.5 (H) 1.8 - 8.0 K/UL  
 ABS. LYMPHOCYTES 6.9 (H) 0.8 - 3.5 K/UL  
 ABS. MONOCYTES 2.7 (H) 0.0 - 1.0 K/UL  
 ABS. EOSINOPHILS 0.0 0.0 - 0.4 K/UL  
 ABS. BASOPHILS 0.0 0.0 - 0.1 K/UL  
 ABS. IMM. GRANS. 0.3 (H) 0.00 - 0.04 K/UL  
 DF SMEAR SCANNED    
 PLATELET COMMENTS Large Platelets  RBC COMMENTS NORMOCYTIC, NORMOCHROMIC    
CK  
 Collection Time: 11/14/20  2:25 AM  
Result Value Ref Range  (H) 39 - 308 U/L  
LACTIC ACID Collection Time: 11/14/20  5:14 AM  
Result Value Ref Range Lactic acid 2.5 (HH) 0.4 - 2.0 MMOL/L  
GLUCOSE, POC Collection Time: 11/14/20  6:14 AM  
Result Value Ref Range Glucose (POC) 220 (H) 65 - 100 mg/dL Performed by [facility] METABOLIC PANEL, BASIC Collection Time: 11/14/20  8:50 AM  
Result Value Ref Range Sodium 138 136 - 145 mmol/L Potassium 4.1 3.5 - 5.1 mmol/L Chloride 109 (H) 97 - 108 mmol/L  
 CO2 19 (L) 21 - 32 mmol/L Anion gap 10 5 - 15 mmol/L Glucose 228 (H) 65 - 100 mg/dL BUN 49 (H) 6 - 20 MG/DL Creatinine 1.15 0.70 - 1.30 MG/DL  
 BUN/Creatinine ratio 43 (H) 12 - 20 GFR est AA >60 >60 ml/min/1.73m2 GFR est non-AA 60 (L) >60 ml/min/1.73m2 Calcium 9.6 8.5 - 10.1 MG/DL  
PTT Collection Time: 11/14/20  8:50 AM  
Result Value Ref Range aPTT 62.6 (H) 22.1 - 31.0 sec  
 aPTT, therapeutic range     58.0 - 77.0 SECS  
LACTIC ACID Collection Time: 11/14/20  8:50 AM  
Result Value Ref Range Lactic acid 2.6 (HH) 0.4 - 2.0 MMOL/L  
GLUCOSE, POC Collection Time: 11/14/20 11:00 AM  
Result Value Ref Range Glucose (POC) 212 (H) 65 - 100 mg/dL Performed by Abdirahman GARCIA PCT Intake/Output Summary (Last 24 hours) at 11/14/2020 1144 Last data filed at 11/14/2020 0487 Gross per 24 hour Intake 2059.19 ml Output  Net 2059.19 ml Patient Vitals for the past 24 hrs: 
 Temp Pulse Resp BP SpO2  
11/14/20 1108 97.7 °F (36.5 °C) (!) 107 22 111/74 97 % 11/14/20 0842 99 °F (37.2 °C) 97 20 114/64 97 % 11/14/20 0311 97.4 °F (36.3 °C) 94 20 132/78 97 % 11/13/20 2330 97.8 °F (36.6 °C) (!) 103 24 119/82 97 % 11/13/20 2000 98.7 °F (37.1 °C) (!) 114 (!) 35 134/70 100 % 11/13/20 1930  (!) 106 14 133/77   
11/13/20 1830  (!) 112 (!) 32 114/69   
11/13/20 1700  (!) 107 24 116/70   
11/13/20 1630  (!) 109 30 119/80  11/13/20 1600  (!) 109 25 129/79   
11/13/20 1530 98.1 °F (36.7 °C) (!) 109 22 118/80   
11/13/20 1500  (!) 102 29 122/84   
11/13/20 1445  (!) 107 (!) 31 120/77   
11/13/20 1330  (!) 108 16 (!) 149/81 95 % 11/13/20 1300  (!) 102 16 137/76 95 % 11/13/20 1200  (!) 102 22 120/82 98 % General:    Not clearly oriented  Alert this am.  
Psychiatric:    Unable to assess Eye/ENT:      Pupils equal, No asymmetry, Conjunctival pink. Able to hear voice at normal amplitude. Evidence of trauma to the scalp Lungs:      Visibly symmetric chest expansion, No palpable tenderness. Clear to auscultation bilaterally. Heart[de-identified]     Variable S1 and S2, irregular rate. Wide split second heart sound. systolic murmur, no click, rub or gallop. No JVD, Normal palpable peripheral pulses. No cyanosis Abdomen:     Soft, non-tender. Bowel sounds normal. No masses,  No   
  organomegaly. Extremities:   Extremities normal, atraumatic, no edema. Neurologic:   Left upper and lower extremity weakness Urbano Valladares MD 
 
11/14/2020  
9:10 AM 
  
 
[facility] Office:   [address] [facility] Office: 
[address]    [address] [facility] [address]     [address] [place] [address] Nw P: [id number]    P: [phone] F: [phone]    F: [phone]

## 2020-11-15 NOTE — PROGRESS NOTES
Hospitalist Progress Note Vinny Bush MD 
Answering service: [phone] from in house phone Date of Service:  11/15/2020 NAME:  Charlie Lorenzana :  1931 MRN:  [id number] Admission Summary: As per initial admission summary This is an 75-year-old man with past medical history significant for dementia, status post CVA, and type 2 diabetes, who was in his usual state of health until the day of his presentation at the emergency room when the patient was found on the floor at the assisted living facility where the patient resides. According to report, the patient was feeling hot all night long, he got up, felt weak and his legs gave out on him, the patient collapsed to the floor and unable to get up. His neighbor heard him screaming all night long. EMS was called. When the EMS arrived at the scene, the patient's EKG shows possible ST elevation myocardial infarction. This was called to the emergency room and the patient was brought to the emergency room as a code ST-elevation myocardial infarction. When the patient arrived at the emergency room, the patient's EKG was reviewed by the cardiologist and code ST-elevation myocardial infarction was canceled. The patient has significant lab abnormalities including significant leukocytosis and elevated troponin level. The patient remained confused in the emergency room, unable to provide good history. The patient was seen by the cardiologist in the emergency room. Conservative treatment was advised. The patient was also seen by the intensivist for evaluation for ICU admission. The patient is not a candidate for ICU admission according to the intensivist.  The patient was subsequently referred to the hospitalist service for evaluation for admission. The patient has no record of prior admission to this hospital. 
 
Interval history / Subjective: Patient seen for Follow up of NSTEMI Patient seen and examined by the bedside, Labs, images and notes reviewed Cardiology following. Unable to obtain any meaning ful history from patient. Patient with no complaints,  
Consulted palliative as well. Patient is sick. D/w emergency contact listed (Gerardo Golden). He told me that he is not poa. Talked to in detail . He was very appreciative. Told him that patient is critically sick and may need to be transferred to ICU. He mentioned that may be a DNR? But patient wants resuscitative measures. CODE status not clear. palliative consulted 11/14: looks more alert today. Apparently having some hallucinations. Blood culture positive for gram positive cocci 1/2. Already on vancomycin. Worsening leucocytosis. ID consult pending 11/14: improving leukocytosis. Afebrile. Poor oral intake. If continues to have poor oral intake may need to put tube feeding. ID following. Overall high risk of deterioration . Heparin drip stopped. Started on lovenox. D/W emergencuy contact (stacy Sotomayor Spotted in detail. Updated him. He was very appreciative Assessment & Plan: 1. Non-ST elevation myocardial infarction:   
on Plavix,  
heparin drip was stopped yesterday as per cardiology Echocardiogram , · Estimated LVEF is <15%. Mildly dilated left ventricle. Decreased wall thickness. Severely and segmentally reduced systolic function. Moderate (grade 2) left ventricular diastolic dysfunction. · Large apical thrombus measuring 8 by 12 mm in size. Associated with apical severe hypokinesis. likely ischemic cardiomyopathy Not a good candidate for any invasive therapies, recommended conservative management Cardiology following #LV apical thrombus May need 6-8 weeks of anticoagulation as per cardiology Not a good candidate for long term anticoagulation Will appreciate cardiology recommendations before discharge regarding oral anticoagulation 2. Sepsis:  persistent significant leukocytosis, elevated lactic acid level, and tachycardia. No clear source of infection at this time. CT chest , Small bilateral pleural effusions with mild bibasilar atelectasis. 
  CT scan of the abdomen and pelvis, negative for any acute changes  
 on vancomycin and cefepime. We will await blood culture results. ID consulted , pending, blood culture gram positive cocci 1/2 Patient already on vancomycin Today some downtrend of leucocytosis #lactic acidosis, improving Low bicarb Started on bicarb drip Likely due to underlying infection #hyperkalemia 
kayexalate ordered BMP in AM 
 
3.   atrial fibrillation: It is not clear whether the patient has history of atrial fibrillation. Cardiology following Not a good candidate for long term anticoagulation . 4.  Acute kidney injury:  resolved This is most likely due to volume depletion. We will carry out fluid therapy and monitor the patient's renal function. BMP in AM 
If continues to get wore will involve nephrology 5. Acute rhabdomyolysis:  improved This is as a result of the patient being found on the floor of his apartment at the assisted living facility. We will carry out fluid therapy and monitor the patient closely. . Improving 6. Abnormal liver function tests: The patient may require Hepatology consult if the abnormality is persistent or getting worse. 7.  Type 2 diabetes with hyperglycemia:  
 The patient will be placed on sliding scale with insulin coverage. Added lantus 15 units . Need further adjustment. 8.  Syncope: MRI and MRA of the brain to evaluate the patient for stroke as a possible cause of syncope, especially in this patient who has had a stroke in the past.   
 
9.  Acute delirium:  waxing and waning This is most likely due to metabolic event. CT scan of the head is negative. Will monitor for now 8.  Fall:  The patient was found on the floor at the assisted living facility. CT scan of the head is negative. CT scan of the cervical spine did not show any acute fracture. PT/OT consult 11. Dementia:  The patient most likely has underlying memory impairment. We will carry out supportive treatment. We will check Q93 and folic acid level. We will await the results of MRI of the brain to evaluate the patient for stroke. 12.  Hypercalcemia:   
IV fluids Code status: Full code DVT prophylaxis: on heparin drip Care Plan discussed with: Patient/Family Disposition: TBD Hospital Problems  Date Reviewed: 11/12/2020 Codes Class Noted POA * (Principal) NSTEMI (non-ST elevated myocardial infarction) ([facility] [address] ICD-10-CM: I21.4 ICD-9-CM: 410.70  11/12/2020 Yes Review of Systems: A comprehensive review of systems was negative except for that written in the HPI. Vital Signs:  
 Last 24hrs VS reviewed since prior progress note. Most recent are: 
Visit Vitals /68 (BP 1 Location: Left arm, BP Patient Position: At rest) Pulse (!) 106 Temp 99.3 °F (37.4 °C) Resp 20 Ht 6' 2\" (1.88 m) Wt 89.7 kg (197 lb 12 oz) SpO2 98% BMI 25.39 kg/m² Intake/Output Summary (Last 24 hours) at 11/15/2020 1544 Last data filed at 11/15/2020 1511 Gross per 24 hour Intake 1444.81 ml Output 0 ml Net 1444.81 ml Physical Examination:  
I had a face to face encounter with this patient and independently examined them on November 15, 2020 as outlined below: 
     
Constitutional:  ill appearing patient , alert today ENT:  Oral mucous moist, oropharynx benign. Neck supple, Resp:  CTA bilaterally. No wheezing/rhonchi/rales. No accessory muscle use CV:  Regular rhythm, normal rate, no murmurs, gallops, rubs GI:  Soft, non distended, non tender. normoactive bowel sounds, no hepatosplenomegaly Musculoskeletal:  No edema, warm, 2+ pulses throughout Data Review:  
 Review and/or order of clinical lab test 
Review and/or order of tests in the radiology section of [facility] Review and/or order of tests in the medicine section of [facility] Labs:  
 
Recent Labs 11/15/20 
0252 11/14/20 0225 WBC 21.8* 27.4* HGB 14.0 15.0  
HCT 41.8 44.7 * 167 Recent Labs 11/15/20 
0419 11/14/20 
0850 11/14/20 0225 11/13/20 
0900 11/13/20 
0422  138 138 138 139  
K 5.3* 4.1 4.2 4.4 4.0  
* 109* 113* 108 107 CO2 15* 19* 14* 18* 20* BUN 47* 49* 46* 36* 36* CREA 1.06 1.15 1.11 1.34* 1.27 * 228* 207* 367* 316* CA 8.8 9.6 9.4 9.4 10.0 MG  --   --   --   --  2.1 PHOS  --   --   --  2.9  --   
 
Recent Labs 11/13/20 
0900 11/13/20 0422 ALT 54  --   
AP 75  --   
TBILI 1.3*  --   
TP 7.0  --   
ALB 3.1*  --   
GLOB 3.9  --   
LPSE  --  45* Recent Labs 11/14/20 
0850 11/14/20 0225 11/13/20 
1826 APTT 62.6* 59.8* 55.6* No results for input(s): FE, TIBC, PSAT, FERR in the last 72 hours. No results found for: FOL, RBCF No results for input(s): PH, PCO2, PO2 in the last 72 hours. Recent Labs 11/14/20 0225 11/13/20 
1537 11/13/20 0900 *  --  690* TROIQ  --  21.60* 33.70* No results found for: CHOL, CHOLX, CHLST, CHOLV, HDL, HDLP, LDL, LDLC, DLDLP, TGLX, TRIGL, TRIGP, CHHD, CHHDX Lab Results Component Value Date/Time Glucose (POC) 167 (H) 11/15/2020 11:23 AM  
 Glucose (POC) 117 (H) 11/15/2020 07:12 AM  
 Glucose (POC) 111 (H) 11/14/2020 09:15 PM  
 Glucose (POC) 200 (H) 11/14/2020 04:34 PM  
 Glucose (POC) 212 (H) 11/14/2020 11:00 AM  
 
Lab Results Component Value Date/Time  Color YELLOW/STRAW 11/12/2020 08:06 PM  
 Appearance CLEAR 11/12/2020 08:06 PM  
 Specific gravity 1.025 11/12/2020 08:06 PM  
 pH (UA) 5.0 11/12/2020 08:06 PM  
 Protein 100 (A) 11/12/2020 08:06 PM  
 Glucose >1,000 (A) 11/12/2020 08:06 PM  
 Ketone 15 (A) 11/12/2020 08:06 PM  
 Bilirubin Negative 11/12/2020 08:06 PM  
 Urobilinogen 0.2 11/12/2020 08:06 PM  
 Nitrites Negative 11/12/2020 08:06 PM  
 Leukocyte Esterase Negative 11/12/2020 08:06 PM  
 Epithelial cells FEW 11/12/2020 08:06 PM  
 Bacteria Negative 11/12/2020 08:06 PM  
 WBC 0-4 11/12/2020 08:06 PM  
 RBC 10-20 11/12/2020 08:06 PM  
 
 
 
Medications Reviewed:  
 
Current Facility-Administered Medications Medication Dose Route Frequency  sodium bicarbonate 150 mEq/1000 mL D5W (premix)   IntraVENous CONTINUOUS  
 [START ON 11/16/2020] Vancomycin trough - due 11/16 prior to the 0200 dose. Thanks! Other ONCE  
 enoxaparin (LOVENOX) injection 90 mg  90 mg SubCUTAneous Q12H  
 haloperidol lactate (HALDOL) injection 2 mg  2 mg IntraMUSCular Q8H PRN  
 insulin glargine (LANTUS) injection 15 Units  15 Units SubCUTAneous DAILY  sodium chloride (NS) flush 5-40 mL  5-40 mL IntraVENous Q8H  
 sodium chloride (NS) flush 5-40 mL  5-40 mL IntraVENous PRN  
 acetaminophen (TYLENOL) tablet 650 mg  650 mg Oral Q6H PRN Or  
 acetaminophen (TYLENOL) suppository 650 mg  650 mg Rectal Q6H PRN  polyethylene glycol (MIRALAX) packet 17 g  17 g Oral DAILY PRN  promethazine (PHENERGAN) tablet 12.5 mg  12.5 mg Oral Q6H PRN Or  
 ondansetron (ZOFRAN) injection 4 mg  4 mg IntraVENous Q6H PRN  
 lactobac ac& pc-s.therm-b.anim (SENDY Q/RISAQUAD)  1 Cap Oral DAILY  cefepime (MAXIPIME) 2 g in 0.9% sodium chloride (MBP/ADV) 100 mL MBP  2 g IntraVENous Q12H  
 insulin lispro (HUMALOG) injection   SubCUTAneous AC&HS  
 glucose chewable tablet 16 g  4 Tab Oral PRN  
 glucagon (GLUCAGEN) injection 1 mg  1 mg IntraMUSCular PRN  
 dextrose 10 % infusion 125-250 mL  125-250 mL IntraVENous PRN  Vancomycin Pharmacy Dosing   Other Rx Dosing/Monitoring  vancomycin (VANCOCIN) 1500 mg in  ml infusion  1,500 mg IntraVENous Q24H  
 insulin lispro (HUMALOG) injection 5 Units  5 Units SubCUTAneous TIDAC  
  clopidogreL (PLAVIX) tablet 75 mg  75 mg Oral DAILY  sodium chloride (NS) flush 5-10 mL  5-10 mL IntraVENous PRN  
 
______________________________________________________________________ EXPECTED LENGTH OF STAY: - - - 
ACTUAL LENGTH OF STAY:          3 Clara Joyce MD

## 2020-11-15 NOTE — PROGRESS NOTES
Cardiology Consult/Progress Note 11/12/2020  2:06 PM  
 
Subjective: More awake and focused today, Making more sense. Does not seem to be actively hallucinating. Denies pain, dyspnea. Says he ate a great breakfast.  
 
Continue supportive care. Plavix, lovenox. Poor OAC candidate but recommended for LV apical thrombus. If going to NH/rehab would continue anticoagulation. Lovenox is fine for now. Assessment and PLAN 1. Acute Rhabdomyolysis 
 - found down, suspect longer than 24 hours - CK 1347, CKMB 110, index 8.2 
 -  IVF therapy 2. NSTEMI 
 -  Trop initially 54 trending down to 33. I suspect he has a high burden of obstructive severe coronary artery disease. Troponin release may be demand related 
 - EKG with Afib RVR, PVC and underlying conduction delay with bifascicular block -will not treat as STEMI given lack of ongoing chest discomfort. ST changes could represent secondary repolarization changes from intraventricular conduction delay/bifascicular block 
 - Echo demonstrates severe LV dysfunction, severe apical hypokinesis with with LV apical thrombus. 3.  Suspected sepsis in conjunction with severe LV systolic dysfunction - LAC 3.6 
 - WBC 21.9 
 - pan Cx 4. Afib 
 - Rate controlled. - Not a candidate for long-term [address]. For short-term given his LV apical thrombus with suggest 6 to 8 weeks of anticoagulation if he goes to a nursing home where he can be monitored closely. 5. MAN 
 - suspect from dehydration 
 - IVF 6. DM II 
 - A1c 8.2 
 - SSI per Primary team 
7. Dementia 
 - seems baseline 8. H/o CVA 
 - left sided affect 
 - on Plavix Mr. Dion Braun is an 63-year-old gentleman with mild cognitive dysfunction, prior stroke with residual left sided hemiparesis who presented to the emergency room after found down by the squad.   He does not report any chest discomfort or shortness of breath at arrival.  His ECG suggests underlying intraventricular conduction delay with ST segment shifts which may suggest either remote or possible recent infarct/injury versus secondary changes to intraventricular conduction delay. Presence of elevated troponin and CK-MB index suggest likely myocardial injury. Overall this picture is suggestive of more chronic ischemic cardiomyopathy with possible superimposed acute ischemia. I suspect based on his echocardiogram that patient has severe/critical multivessel disease. Ideally would consider anticoagulant and aspirin at discharge. However given recent fall uncertain if he is a good candidate for long-term anticoagulation. I do not feel that patient understood our discussion regarding his cardiac condition or need for above-mentioned medical therapy. I suggested that we discussed final goals of care with him. If he ends up going in a nursing home where he can be monitored closely, will strongly recommend use of at least short-term 6 to 8 weeks of anticoagulation mostly for LV apical thrombus. [x]    High complexity decision making was performed 
[x]    Patient is at high-risk of decompensation with multiple organ involvement Referring physician Dr. Roula Garcia,: 
HPI: Giovanni Manrique is a 80 y.o. male who was brought to [facility] emergency room after being found down. Lucien was called by his neighbors who hold noises from his house overnight and earlier this morning. When the lucien reached the site patient was found on the floor leaning his head on the wall. He was not unconscious. He said that he was on the floor overnight and was unable to move. He has prior history of stroke with residual left hemiparesis although has been living in independent living. He denies any symptoms of syncope, chest discomfort, significant shortness of breath. Lucien performed an EKG on the way and were concerned about possible ST elevation myocardial infarction.   Hence cardiology was consulted. Investigation ECG: ECG shows atrial fibrillation with rapid ventricular rate, PVCs, underlying intraventricular conduction delay with bifascicular block pattern. There are ST segment changes which may suggest ischemia/infarction. However in the absence of any active chest discomfort I highly doubt that these reflect true MI despite ST segment being concordant in lead V2 and V3. ST segment changes in V4 are discordant and less than 5 mm. There are no obvious reciprocal changes noted. Echocardiogram: Not performed White cell count 22.8 Troponin 3 PM: 54 Total CK 1365; CK-; CK-MB index 8.2 Review of Symptoms: 
Could not be performed because of patient being unable to answer all the questions. Patient Active Problem List  
 Diagnosis Date Noted  NSTEMI (non-ST elevated myocardial infarction) ([facility] [address] 11/12/2020 UNKNOWN 
No past medical history on file. No past surgical history on file. Social History Socioeconomic History  Marital status:  Spouse name: Not on file  Number of children: Not on file  Years of education: Not on file  Highest education level: Not on file No family history on file. Current Facility-Administered Medications Medication Dose Route Frequency  sodium bicarbonate 150 mEq/1000 mL D5W (premix)   IntraVENous CONTINUOUS  
 [START ON 11/16/2020] Vancomycin trough - due 11/16 prior to the 0200 dose. Thanks! Other ONCE  
 enoxaparin (LOVENOX) injection 90 mg  90 mg SubCUTAneous Q12H  
 haloperidol lactate (HALDOL) injection 2 mg  2 mg IntraMUSCular Q8H PRN  
 insulin glargine (LANTUS) injection 15 Units  15 Units SubCUTAneous DAILY  sodium chloride (NS) flush 5-40 mL  5-40 mL IntraVENous Q8H  
 sodium chloride (NS) flush 5-40 mL  5-40 mL IntraVENous PRN  
 acetaminophen (TYLENOL) tablet 650 mg  650 mg Oral Q6H PRN  Or  
 acetaminophen (TYLENOL) suppository 650 mg  650 mg Rectal Q6H PRN  
  polyethylene glycol (MIRALAX) packet 17 g  17 g Oral DAILY PRN  promethazine (PHENERGAN) tablet 12.5 mg  12.5 mg Oral Q6H PRN Or  
 ondansetron (ZOFRAN) injection 4 mg  4 mg IntraVENous Q6H PRN  
 lactobac ac& pc-s.therm-b.anim (SENDY Q/RISAQUAD)  1 Cap Oral DAILY  cefepime (MAXIPIME) 2 g in 0.9% sodium chloride (MBP/ADV) 100 mL MBP  2 g IntraVENous Q12H  
 insulin lispro (HUMALOG) injection   SubCUTAneous AC&HS  
 glucose chewable tablet 16 g  4 Tab Oral PRN  
 glucagon (GLUCAGEN) injection 1 mg  1 mg IntraMUSCular PRN  
 dextrose 10 % infusion 125-250 mL  125-250 mL IntraVENous PRN  Vancomycin Pharmacy Dosing   Other Rx Dosing/Monitoring  vancomycin (VANCOCIN) 1500 mg in  ml infusion  1,500 mg IntraVENous Q24H  
 insulin lispro (HUMALOG) injection 5 Units  5 Units SubCUTAneous TIDAC  clopidogreL (PLAVIX) tablet 75 mg  75 mg Oral DAILY  sodium chloride (NS) flush 5-10 mL  5-10 mL IntraVENous PRN None No Known Allergies Labs:  
Recent Results (from the past 24 hour(s)) LACTIC ACID Collection Time: 11/14/20  2:05 PM  
Result Value Ref Range Lactic acid 2.6 (HH) 0.4 - 2.0 MMOL/L  
GLUCOSE, POC Collection Time: 11/14/20  4:34 PM  
Result Value Ref Range Glucose (POC) 200 (H) 65 - 100 mg/dL Performed by Ivan BANKS   
LACTIC ACID Collection Time: 11/14/20  6:09 PM  
Result Value Ref Range Lactic acid 2.3 (HH) 0.4 - 2.0 MMOL/L  
GLUCOSE, POC Collection Time: 11/14/20  9:15 PM  
Result Value Ref Range Glucose (POC) 111 (H) 65 - 100 mg/dL Performed by [employer]   
CBC WITH AUTOMATED DIFF Collection Time: 11/15/20  2:52 AM  
Result Value Ref Range WBC 21.8 (H) 4.1 - 11.1 K/uL  
 RBC 4.52 4.10 - 5.70 M/uL  
 HGB 14.0 12.1 - 17.0 g/dL HCT 41.8 36.6 - 50.3 % MCV 92.5 80.0 - 99.0 FL  
 MCH 31.0 26.0 - 34.0 PG  
 MCHC 33.5 30.0 - 36.5 g/dL  
 RDW 13.9 11.5 - 14.5 % PLATELET 153 (L) 593 - 400 K/uL NRBC 0.1 (H) 0  WBC ABSOLUTE NRBC 0.02 (H) 0.00 - 0.01 K/uL NEUTROPHILS 66 32 - 75 % LYMPHOCYTES 23 12 - 49 % MONOCYTES 10 5 - 13 % EOSINOPHILS 0 0 - 7 % BASOPHILS 0 0 - 1 % IMMATURE GRANULOCYTES 1 (H) 0.0 - 0.5 % ABS. NEUTROPHILS 14.4 (H) 1.8 - 8.0 K/UL  
 ABS. LYMPHOCYTES 5.0 (H) 0.8 - 3.5 K/UL  
 ABS. MONOCYTES 2.2 (H) 0.0 - 1.0 K/UL  
 ABS. EOSINOPHILS 0.0 0.0 - 0.4 K/UL  
 ABS. BASOPHILS 0.0 0.0 - 0.1 K/UL  
 ABS. IMM. GRANS. 0.2 (H) 0.00 - 0.04 K/UL  
 DF SMEAR SCANNED    
 RBC COMMENTS NORMOCYTIC, NORMOCHROMIC METABOLIC PANEL, BASIC Collection Time: 11/15/20  4:19 AM  
Result Value Ref Range Sodium 136 136 - 145 mmol/L Potassium 5.3 (H) 3.5 - 5.1 mmol/L Chloride 116 (H) 97 - 108 mmol/L  
 CO2 15 (LL) 21 - 32 mmol/L Anion gap 5 5 - 15 mmol/L Glucose 110 (H) 65 - 100 mg/dL BUN 47 (H) 6 - 20 MG/DL Creatinine 1.06 0.70 - 1.30 MG/DL  
 BUN/Creatinine ratio 44 (H) 12 - 20 GFR est AA >60 >60 ml/min/1.73m2 GFR est non-AA >60 >60 ml/min/1.73m2 Calcium 8.8 8.5 - 10.1 MG/DL  
GLUCOSE, POC Collection Time: 11/15/20  7:12 AM  
Result Value Ref Range Glucose (POC) 117 (H) 65 - 100 mg/dL Performed by Ludmila Grimm GLUCOSE, POC Collection Time: 11/15/20 11:23 AM  
Result Value Ref Range Glucose (POC) 167 (H) 65 - 100 mg/dL Performed by Edis Lepe Intake/Output Summary (Last 24 hours) at 11/15/2020 1331 Last data filed at 11/15/2020 1124 Gross per 24 hour Intake 1607.57 ml Output 0 ml Net 1607.57 ml Patient Vitals for the past 24 hrs: 
 Temp Pulse Resp BP SpO2  
11/15/20 1124 97.8 °F (36.6 °C) (!) 108 21 113/81 97 % 11/15/20 0801 98.8 °F (37.1 °C) (!) 101 20 116/79 94 % 11/15/20 0301 97.3 °F (36.3 °C) (!) 108 18 106/70 97 % 11/14/20 2307 98.7 °F (37.1 °C) (!) 114 18 121/65 96 % 11/14/20 2013 98.1 °F (36.7 °C) (!) 105 16 119/78 97 % 11/14/20 1502 98.4 °F (36.9 °C) 96 22 115/71 97 % General:    Not clearly oriented  Alert this am.  
Psychiatric:    Unable to assess Eye/ENT:      Pupils equal, No asymmetry, Conjunctival pink. Able to hear voice at normal amplitude. Evidence of trauma to the scalp Lungs:      Visibly symmetric chest expansion, No palpable tenderness. Clear to auscultation bilaterally. Heart[de-identified]     Variable S1 and S2, irregular rate. Wide split second heart sound. systolic murmur, no click, rub or gallop. No JVD, Normal palpable peripheral pulses. No cyanosis Abdomen:     Soft, non-tender. Bowel sounds normal. No masses,  No   
  organomegaly. Extremities:   Extremities normal, atraumatic, no edema. Neurologic:   Left upper and lower extremity weakness Inés Sparks MD 
 
11/15/2020  
9:10 AM 
  
 
[facility] Office:   Jefferson Mayen Office: 
[address] [facility]    [address]     [address] [place] [address] P: [id number]    P: [phone] F: [phone]    F: [phone]

## 2020-11-15 NOTE — PROGRESS NOTES
Transition of Care Plan 
RUR 14% Disposition   TBD pending medical and therapy recommendations Transportation  TBD Medical follow up  No PCP --moved to [facility] from [place] 2 month ago Contact Jil Cooks  Nephew  [phone] Reason for Admission:   NSTEMI Hx of Dementia, diabetes, prior CVA RUR Score:        14% Plan for utilizing home health:    TBD   
 
PCP: First and Last name:  None Name of Practice:  
 Are you a current patient: Yes/No:  
 Approximate date of last visit:  
 Can you participate in a virtual visit with your PCP:  
                 
Current Advanced Directive/Advance Care Plan:  No AMD     Patient has refused to appoint Esau Nieves  is next of kin Transition of Care Plan:        TBD pending medical and therapy progress and recommendations CM met with patient and nephew in patient's room to introduce self and explain role. Patient was alert and able to confirm where he was living but could not give address-- He confirmed that he moved to [address] [place] [address] from [place] a couple of months ago with the assistance of his nephew Anais Holland (lives in [place] he is from the  [address] [place] [address] area and wanted to return. No family in [address] [place] [address]. He has no family except his nephew Anais Holland. (Anais Holland drove from [facility] yesterday to see patient.). Denae Chauhan Patient told CM that he fell in his apartment and his neighbor call the rescue squad. He was self care prior to admission. Patient lives alone in an independent living apartment at [facility]. He handles his own affairs, paying his own bills -Has a  to assist with cleaning.  - His wife is  and has no children. He has not made his nephew MPOA nor completed AMD-- Palliative has been consulted. (Davis's father is patient's brother and is in his [de-identified] and frail an defers decision making to Anais Holland) His nephew told CM has been trying to secure guardianship and had started it in [place] and then patient moved to [facility] has now contacted [employer] to assist with this. Medicare pt has received, reviewed, and signed 1st IM letter informing them of their right to appeal the discharge. CM explained the medicare letter to both patient and nephew. Patient not able to sign. Copy placed in chart. Patient said his card is at his apartment but nephew was able to provide admissions with patient's SS number in hopes his Medicare number can be secured. Cardiology is following patient -- Therapy to evaluate   Cm will follow to assist with transition of care planning. .. May need Snf or higher level of living-- (AL) Care Management Interventions PCP Verified by CM: (no PCP) Mode of Transport at Discharge: (TBD) Transition of Care Consult (CM Consult): Discharge Planning Speech Therapy Consult: Yes Current Support Network: Lives Alone(lives alone in independent living at [facility]   has  to assist with cleaning. prepares own meals. No AMD ) Confirm Follow Up Transport: Sandy Fan at [facility]) Discharge Location Discharge Placement: (TBD)

## 2020-11-15 NOTE — PROGRESS NOTES
0730: Bedside shift change report received by Deny Alcocer (offgoing nurse). Report included the following information SBAR, Kardex, Procedure Summary, Intake/Output, MAR, Recent Results, and Cardiac Rhythm a fib with BBB .  
 
 0900: Pt has been having issues with thin liquids over the past 24 hours- he coughs and sputters when given small sips. Speech therapy consult has been ordered but they have not been in to see him. Nursing has been giving patient thickened liquids/meds crushed in applesauce, and he swallows those without difficulty, but requested official order for thickened liquids from Dr. Ceasar Apple pending gabriel; MD approved. MD also states to hold MRI for now given pt's mental status and inability to complete safety checklist.  
 
1930: Bedside shift change report given to Sherryle Fusi (oncoming nurse) . Report included the following information SBAR, Kardex, Procedure Summary, Intake/Output, MAR, Recent Results and Cardiac Rhythm a fib with BBB.  
 
------------------ Care Plan 2776 Problem: Pressure Injury - Risk of 
Goal: *Prevention of pressure injury Description: Document Eric Scale and appropriate interventions in the flowsheet. Outcome: Progressing Towards Goal 
Note: Pressure Injury Interventions: 
Sensory Interventions: Discuss PT/OT consult with provider, Float heels, Keep linens dry and wrinkle-free, Maintain/enhance activity level, Minimize linen layers, Pressure redistribution bed/mattress (bed type), Turn and reposition approx. every two hours (pillows and wedges if needed) Moisture Interventions: Check for incontinence Q2 hours and as needed, Absorbent underpads, Apply protective barrier, creams and emollients, Assess need for specialty bed, Internal/External urinary devices, Limit adult briefs, Maintain skin hydration (lotion/cream) Activity Interventions: Increase time out of bed, Pressure redistribution bed/mattress(bed type), PT/OT evaluation Mobility Interventions: HOB 30 degrees or less, Pressure redistribution bed/mattress (bed type), PT/OT evaluation, Turn and reposition approx. every two hours(pillow and wedges) Nutrition Interventions: Document food/fluid/supplement intake Friction and Shear Interventions: Apply protective barrier, creams and emollients, Foam dressings/transparent film/skin sealants, Lift sheet Problem: Falls - Risk of 
Goal: *Absence of Falls Description: Document Fanny Mauro Fall Risk and appropriate interventions in the flowsheet. Outcome: Progressing Towards Goal 
Note: Fall Risk Interventions: 
Mobility Interventions: Communicate number of staff needed for ambulation/transfer, OT consult for ADLs, Patient to call before getting OOB, PT Consult for mobility concerns Mentation Interventions: Bed/chair exit alarm, Door open when patient unattended, Adequate sleep, hydration, pain control, More frequent rounding, Reorient patient Medication Interventions: Evaluate medications/consider consulting pharmacy, Patient to call before getting OOB, Teach patient to arise slowly Elimination Interventions: Call light in reach, Patient to call for help with toileting needs, Stay With Me (per policy), Toileting schedule/hourly rounds History of Falls Interventions: Door open when patient unattended, Investigate reason for fall, Room close to nurse's station

## 2020-11-15 NOTE — PROGRESS NOTES
Spiritual Care Assessment/Progress Note [address] 
 
 
NAME: Ricardo Chacon      MRN: [id number] AGE: 80 y.o. SEX: male Yazidi Affiliation: No preference Language: English  
 
11/15/2020     Total Time (in minutes): 25 Spiritual Assessment begun in [facility] 4 CV SERVICES UNIT through conversation with: 
  
    [x]Patient        [] Family    [] Friend(s) Reason for Consult: Request by staff Spiritual beliefs: (Please include comment if needed) [x] Identifies with a tiarra tradition:     
   [] Supported by a tiarra community:        
   [] Claims no spiritual orientation:       
   [] Seeking spiritual identity:            
   [] Adheres to an individual form of spirituality:       
   [] Not able to assess:                   
 
    
Identified resources for coping:  
   [] Prayer                           
   [] Music                  [] Guided Imagery 
   [] Family/friends                 [] Pet visits [] Devotional reading                         [] Unknown 
   [] Other:                                         
 
 
Interventions offered during this visit: (See comments for more details) Patient Interventions: Affirmation of emotions/emotional suffering, Affirmation of tiarra, Catharsis/review of pertinent events in supportive environment, Coping skills reviewed/reinforced, Iconic (affirming the presence of God/Higher Power) Plan of Care: 
 
 [] Support spiritual and/or cultural needs  
 [] Support AMD and/or advance care planning process    
 [] Support grieving process 
 [] Coordinate Rites and/or Rituals  
 [] Coordination with community clergy [] No spiritual needs identified at this time 
 [] Detailed Plan of Care below (See Comments)  [] Make referral to Music Therapy 
[] Make referral to Pet Therapy    
[] Make referral to Addiction services 
[] Make referral to [facility] 
[] Make referral to Spiritual Care Partner 
[] No future visits requested [x] Follow up visits as needed Visited pt at nurse's request. Pt presents as pleasant and engaging. He enjoys conversation. However, he exhibits a propensity to meander in in his line of thought, displaying confusion. He reports having no close relatives; he reports having a brother who \"abonded\" him. Offered presence and listened as he talked about old TV shows from the 1950's. Chaplains will continue to offer support as needed. Chaplain Mau, MDiv, MS, [facility] 
[phone])

## 2020-11-15 NOTE — PROGRESS NOTES
1930: Bedside shift change report given to Bautista Arreola (oncoming nurse) by Ramy Dietz (offgoing nurse). Report included the following information SBAR, Kardex, Intake/Output, MAR, Recent Results, and Cardiac Rhythm afib . Problem: Pressure Injury - Risk of 
Goal: *Prevention of pressure injury Description: Document Eric Scale and appropriate interventions in the flowsheet. Outcome: Progressing Towards Goal 
Note: Pressure Injury Interventions: 
Sensory Interventions: Keep linens dry and wrinkle-free, Minimize linen layers, Float heels Moisture Interventions: Check for incontinence Q2 hours and as needed, Maintain skin hydration (lotion/cream), Minimize layers, Apply protective barrier, creams and emollients, Absorbent underpads Activity Interventions: Increase time out of bed, Pressure redistribution bed/mattress(bed type) Mobility Interventions: Pressure redistribution bed/mattress (bed type), PT/OT evaluation Nutrition Interventions: Document food/fluid/supplement intake, Offer support with meals,snacks and hydration Friction and Shear Interventions: Lift team/patient mobility team, Minimize layers, Apply protective barrier, creams and emollients Problem: Falls - Risk of 
Goal: *Absence of Falls Description: Document Arnie Barrington Fall Risk and appropriate interventions in the flowsheet. Outcome: Progressing Towards Goal 
Note: Fall Risk Interventions: 
Mobility Interventions: Bed/chair exit alarm, Communicate number of staff needed for ambulation/transfer Mentation Interventions: Door open when patient unattended, Reorient patient, More frequent rounding Medication Interventions: Bed/chair exit alarm Elimination Interventions: Call light in reach, Patient to call for help with toileting needs, Toileting schedule/hourly rounds History of Falls Interventions: Bed/chair exit alarm, Consult care management for discharge planning, Door open when patient unattended, Room close to nurse's station

## 2020-11-15 NOTE — PROGRESS NOTES
Hospitalist Progress Note Heather Beckford MD 
Answering service: [phone] from in house phone Date of Service:  11/15/2020 NAME:  Katie Washington :  1931 MRN:  [id number] Admission Summary: As per initial admission summary This is an 80-year-old man with past medical history significant for dementia, status post CVA, and type 2 diabetes, who was in his usual state of health until the day of his presentation at the emergency room when the patient was found on the floor at the assisted living facility where the patient resides. According to report, the patient was feeling hot all night long, he got up, felt weak and his legs gave out on him, the patient collapsed to the floor and unable to get up. His neighbor heard him screaming all night long. EMS was called. When the EMS arrived at the scene, the patient's EKG shows possible ST elevation myocardial infarction. This was called to the emergency room and the patient was brought to the emergency room as a code ST-elevation myocardial infarction. When the patient arrived at the emergency room, the patient's EKG was reviewed by the cardiologist and code ST-elevation myocardial infarction was canceled. The patient has significant lab abnormalities including significant leukocytosis and elevated troponin level. The patient remained confused in the emergency room, unable to provide good history. The patient was seen by the cardiologist in the emergency room. Conservative treatment was advised. The patient was also seen by the intensivist for evaluation for ICU admission. The patient is not a candidate for ICU admission according to the intensivist.  The patient was subsequently referred to the hospitalist service for evaluation for admission. The patient has no record of prior admission to this hospital. 
 
Interval history / Subjective: Patient seen for Follow up of NSTEMI Patient seen and examined by the bedside, Labs, images and notes reviewed Cardiology following. Unable to obtain any meaning ful history from patient. Patient with no complaints,  
Consulted palliative as well. Patient is sick. D/w emergency contact listed (Adolfo Piña). He told me that he is not poa. Talked to in detail . He was very appreciative. Told him that patient is critically sick and may need to be transferred to ICU. He mentioned that may be a DNR? But patient wants resuscitative measures. CODE status not clear. palliative consulted 11/14: looks more alert today. Apparently having some hallucinations. Blood culture positive for gram positive cocci 1/2. Already on vancomycin. Worsening leucocytosis. ID consult pending 11/14: improving leukocytosis. Afebrile. Poor oral intake. If continues to have poor oral intake may need to put tube feeding. ID following. Overall high risk of deterioration . Heparin drip stopped Assessment & Plan: 1. Non-ST elevation myocardial infarction:   
on Plavix,  
heparin drip was stopped yesterday as per cardiology Echocardiogram , · Estimated LVEF is <15%. Mildly dilated left ventricle. Decreased wall thickness. Severely and segmentally reduced systolic function. Moderate (grade 2) left ventricular diastolic dysfunction. · Large apical thrombus measuring 8 by 12 mm in size. Associated with apical severe hypokinesis. likely ischemic cardiomyopathy Not a good candidate for any invasive therapies, recommended conservative management Cardiology following #LV apical thrombus May need 6-8 weeks of anticoagulation as per cardiology Not a good candidate for long term anticoagulation Will appreciate cardiology recommendations before discharge regarding oral anticoagulation 2. Sepsis:  persistent  
 significant leukocytosis, elevated lactic acid level, and tachycardia. No clear source of infection at this time. CT chest , Small bilateral pleural effusions with mild bibasilar atelectasis. 
  CT scan of the abdomen and pelvis, negative for any acute changes  
 on vancomycin and cefepime. We will await blood culture results. ID consulted , pending, blood culture gram positive cocci 1/2 Patient already on vancomycin Today some downtrend of leucocytosis #lactic acidosis, improving Low bicarb Started on bicarb drip Likely due to underlying infection #hyperkalemia 
kayexalate ordered BMP in AM 
 
3.   atrial fibrillation: It is not clear whether the patient has history of atrial fibrillation. Cardiology following Not a good candidate for long term anticoagulation . 4.  Acute kidney injury:  resolved This is most likely due to volume depletion. We will carry out fluid therapy and monitor the patient's renal function. BMP in AM 
If continues to get wore will involve nephrology 5. Acute rhabdomyolysis:  improved This is as a result of the patient being found on the floor of his apartment at the assisted living facility. We will carry out fluid therapy and monitor the patient closely. . Improving 6. Abnormal liver function tests: The patient may require Hepatology consult if the abnormality is persistent or getting worse. 7.  Type 2 diabetes with hyperglycemia:  
 The patient will be placed on sliding scale with insulin coverage. Added lantus 15 units . Need further adjustment. 8.  Syncope: MRI and MRA of the brain to evaluate the patient for stroke as a possible cause of syncope, especially in this patient who has had a stroke in the past.   
 
9.  Acute delirium:  waxing and waning This is most likely due to metabolic event. CT scan of the head is negative. Will monitor for now 10.   Fall:  The patient was found on the floor at the assisted living facility. CT scan of the head is negative. CT scan of the cervical spine did not show any acute fracture. PT/OT consult 11. Dementia:  The patient most likely has underlying memory impairment. We will carry out supportive treatment. We will check W46 and folic acid level. We will await the results of MRI of the brain to evaluate the patient for stroke. 12.  Hypercalcemia:   
IV fluids Code status: Full code DVT prophylaxis: on heparin drip Care Plan discussed with: Patient/Family Disposition: TBD Hospital Problems  Date Reviewed: 11/12/2020 Codes Class Noted POA * (Principal) NSTEMI (non-ST elevated myocardial infarction) ([facility] [address] ICD-10-CM: I21.4 ICD-9-CM: 410.70  11/12/2020 Yes Review of Systems: A comprehensive review of systems was negative except for that written in the HPI. Vital Signs:  
 Last 24hrs VS reviewed since prior progress note. Most recent are: 
Visit Vitals /79 (BP 1 Location: Left arm, BP Patient Position: At rest) Pulse (!) 101 Temp 98.8 °F (37.1 °C) Resp 20 Ht 6' 2\" (1.88 m) Wt 89.7 kg (197 lb 12 oz) SpO2 94% BMI 25.39 kg/m² Intake/Output Summary (Last 24 hours) at 11/15/2020 0528 Last data filed at 11/14/2020 1918 Gross per 24 hour Intake 1324.46 ml Output 0 ml Net 1324.46 ml Physical Examination:  
I had a face to face encounter with this patient and independently examined them on November 15, 2020 as outlined below: 
     
Constitutional:  ill appearing patient , alert today ENT:  Oral mucous moist, oropharynx benign. Neck supple, Resp:  CTA bilaterally. No wheezing/rhonchi/rales. No accessory muscle use CV:  Regular rhythm, normal rate, no murmurs, gallops, rubs GI:  Soft, non distended, non tender. normoactive bowel sounds, no hepatosplenomegaly Musculoskeletal:  No edema, warm, 2+ pulses throughout Data Review:  
 Review and/or order of clinical lab test 
 Review and/or order of tests in the radiology section of [facility] Review and/or order of tests in the medicine section of [facility] Labs:  
 
Recent Labs 11/15/20 
0252 11/14/20 0225 WBC 21.8* 27.4* HGB 14.0 15.0  
HCT 41.8 44.7 * 167 Recent Labs 11/15/20 
0419 11/14/20 
0850 11/14/20 0225 11/13/20 
0900 11/13/20 
0422  138 138 138 139  
K 5.3* 4.1 4.2 4.4 4.0  
* 109* 113* 108 107 CO2 15* 19* 14* 18* 20* BUN 47* 49* 46* 36* 36* CREA 1.06 1.15 1.11 1.34* 1.27 * 228* 207* 367* 316* CA 8.8 9.6 9.4 9.4 10.0 MG  --   --   --   --  2.1 PHOS  --   --   --  2.9  --   
 
Recent Labs 11/13/20 
0900 11/13/20 
0422 11/12/20 
1404 ALT 54  --  52 AP 75  --  76  
TBILI 1.3*  --  1.3* TP 7.0  --  7.4 ALB 3.1*  --  3.6 GLOB 3.9  --  3.8 LPSE  --  45*  --   
 
Recent Labs 11/14/20 
0850 11/14/20 0225 11/13/20 
1826 APTT 62.6* 59.8* 55.6* No results for input(s): FE, TIBC, PSAT, FERR in the last 72 hours. No results found for: FOL, RBCF No results for input(s): PH, PCO2, PO2 in the last 72 hours. Recent Labs 11/14/20 0225 11/13/20 
1537 11/13/20 
0900 11/12/20 
1404 *  --  690* 1,347*  1,365* CKNDX  --   --   --  8.2*  
TROIQ  --  21.60* 33.70* 54.60* No results found for: CHOL, CHOLX, CHLST, CHOLV, HDL, HDLP, LDL, LDLC, DLDLP, TGLX, TRIGL, TRIGP, CHHD, CHHDX Lab Results Component Value Date/Time Glucose (POC) 117 (H) 11/15/2020 07:12 AM  
 Glucose (POC) 111 (H) 11/14/2020 09:15 PM  
 Glucose (POC) 200 (H) 11/14/2020 04:34 PM  
 Glucose (POC) 212 (H) 11/14/2020 11:00 AM  
 Glucose (POC) 220 (H) 11/14/2020 06:14 AM  
 
Lab Results Component Value Date/Time  Color YELLOW/STRAW 11/12/2020 08:06 PM  
 Appearance CLEAR 11/12/2020 08:06 PM  
 Specific gravity 1.025 11/12/2020 08:06 PM  
 pH (UA) 5.0 11/12/2020 08:06 PM  
 Protein 100 (A) 11/12/2020 08:06 PM  
 Glucose >1,000 (A) 11/12/2020 08:06 PM  
 Ketone 15 (A) 11/12/2020 08:06 PM  
 Bilirubin Negative 11/12/2020 08:06 PM  
 Urobilinogen 0.2 11/12/2020 08:06 PM  
 Nitrites Negative 11/12/2020 08:06 PM  
 Leukocyte Esterase Negative 11/12/2020 08:06 PM  
 Epithelial cells FEW 11/12/2020 08:06 PM  
 Bacteria Negative 11/12/2020 08:06 PM  
 WBC 0-4 11/12/2020 08:06 PM  
 RBC 10-20 11/12/2020 08:06 PM  
 
 
 
Medications Reviewed:  
 
Current Facility-Administered Medications Medication Dose Route Frequency  sodium polystyrene (KAYEXALATE) 15 gram/60 mL oral suspension 15 g  15 g Oral NOW  sodium bicarbonate (8.4%) 150 mEq in dextrose 5% 1,000 mL infusion   IntraVENous CONTINUOUS  
 sodium bicarbonate 150 mEq/1000 mL D5W (premix)   IntraVENous CONTINUOUS  
 enoxaparin (LOVENOX) injection 90 mg  90 mg SubCUTAneous Q12H  
 haloperidol lactate (HALDOL) injection 2 mg  2 mg IntraMUSCular Q8H PRN  
 insulin glargine (LANTUS) injection 15 Units  15 Units SubCUTAneous DAILY  sodium chloride (NS) flush 5-40 mL  5-40 mL IntraVENous Q8H  
 sodium chloride (NS) flush 5-40 mL  5-40 mL IntraVENous PRN  
 acetaminophen (TYLENOL) tablet 650 mg  650 mg Oral Q6H PRN Or  
 acetaminophen (TYLENOL) suppository 650 mg  650 mg Rectal Q6H PRN  polyethylene glycol (MIRALAX) packet 17 g  17 g Oral DAILY PRN  promethazine (PHENERGAN) tablet 12.5 mg  12.5 mg Oral Q6H PRN Or  
 ondansetron (ZOFRAN) injection 4 mg  4 mg IntraVENous Q6H PRN  
 lactobac ac& pc-s.therm-b.anim (SENDY Q/RISAQUAD)  1 Cap Oral DAILY  cefepime (MAXIPIME) 2 g in 0.9% sodium chloride (MBP/ADV) 100 mL MBP  2 g IntraVENous Q12H  
 insulin lispro (HUMALOG) injection   SubCUTAneous AC&HS  
 glucose chewable tablet 16 g  4 Tab Oral PRN  
 glucagon (GLUCAGEN) injection 1 mg  1 mg IntraMUSCular PRN  
 dextrose 10 % infusion 125-250 mL  125-250 mL IntraVENous PRN  Vancomycin Pharmacy Dosing   Other Rx Dosing/Monitoring  vancomycin (VANCOCIN) 1500 mg in  ml infusion  1,500 mg IntraVENous Q24H  
 insulin lispro (HUMALOG) injection 5 Units  5 Units SubCUTAneous TIDAC  clopidogreL (PLAVIX) tablet 75 mg  75 mg Oral DAILY  sodium chloride (NS) flush 5-10 mL  5-10 mL IntraVENous PRN  
 
______________________________________________________________________ EXPECTED LENGTH OF STAY: - - - 
ACTUAL LENGTH OF STAY:          3 Moustapha Mata MD

## 2020-11-16 NOTE — CONSULTS
Palliative Medicine Consult Demarcus: [phone] ([phone]) Patient Name: Alex Hardin YOB: 1931 Date of Initial Consult: 2020 Reason for Consult: care decisions Requesting Provider: Dr. Amilcar Hodgson Primary Care Physician: UNKNOWN 
 
 SUMMARY:  
Alex Hardin is a 80 y.o. with a past history of early dementia, hx CVA X2  w left side hemiparesis, DM, who was admitted on 2020 from [facility] with a diagnosis of found down/ AMS. Current medical issues leading to Palliative Medicine involvement include:  
Metabolic encephalopathy/ delirium/ underlying dementia, NSTEMI, ECHO w EF 15%, large apical thrombus/ severe apical hypokinesis Acute rhabdomyolysis Patient is a [employer] . Retired from [employer]. His wife of 32 years  in  of breast cancer. They do not have any children. He was living in [place] (unsafe living conditions/ not able to care for self), when his nephew Amada Stage convinced him to move to [place]. (guardianship proceedings in [place] still pending/ delayed w pandemic) Patient is originally from [place], and his wife is buried here. Marizol Cronin lives in [place]. (his father is patient's brother, he is in 80s/ frail health/ has been deferring decision making to son Dianelys Santana) Stephane Bach PALLIATIVE DIAGNOSES:  
1. NSTEMI, coronary artery disease, ischemic cardiomyopathy EF 15%, apical thrombus/ hypokinesis 2. Delirium with underlying dementia. Long history of paranoid ideations. Exacerbated now with acute issues. Patient does not have capacity at this time for complex medical decisions or discharge planning, use TIMOTHYKmendy Stage. 3. Debility 4. Sepsis, no source identified, improved on abx 5. Late effect of stroke, L hemiparesis 6. Hearing impaired PLAN:  
1. Discussed with bedside nurse.  
2. Patient able to engage, not able to demonstrate decision making about health issues. Demonstrates paranoia at times about being poisoned, unhappy with thickened liquids. 3. Met with nephew Josy Ocasio 1. Introduced palliative medicine services 2. Reviewed medical update.  (EF 15%, NSTEMI, confusion/ paranoia (not new), underlying dementia, infection) 3. Idania Glance of past events/ move to [place] (patient wants to be buried next to his wife who is buried in [place]) 4. Patient has been clear about wishes in past for DNR (patient DDNR signed in [place] in 7-2020) -- will scan into records here. Also Fady signed [address] for patient today. 5. We talked about swallowing issues/ why he's been on thickened liquids/ does not like it/ continues to ask for water/ coffee. Agree that patient would want comfort diet/ be allowed to have what he wants. 6. Discuss option of trying rehab vs. Focus on comfort. Chris Farias knows patient would not want attempts at rehab, he's bee clear about wishes in past (wanting to go when it's his time/ frustrated w nephew for rescuing him/ getting him medical care). 7. Recommend focus on comfort/ needs safe place for that kind of care (LTC or prison) and referral to a hospice -- Fady in agreement with that plan. 8. Discussed w case management 9. Will continue to follow In summary: No escalation of care, overall goals comfort Continue easy measures that patient tolerates, such as medications, antibiotics Comfort diet, allow patient autonomy in food choices. DDNR Needs safe place to live (not able to safely care for self in Independent living) Consult hospice for time of discharge (does not meet criteria at this time for inpatient hospice) 4. Initial consult note routed to primary continuity provider and/or primary health care team members 5.  Communicated plan of care with: Palliative IDTC[facility] 192 Team 
 
 GOALS OF CARE / TREATMENT PREFERENCES:  
 
GOALS OF CARE: 
 Patient/Health Care Proxy Stated Goals: Other (comment) TREATMENT PREFERENCES:  
Code Status: Full Code Advance Care Planning: 
[] [facility] Interdisciplinary Team has updated the ACP Navigator with Beatris and Patient Capacity Advance Care Planning 11/15/2020 Confirm Advance Directive None Medical Interventions: Other (comment) Other Instructions: Other: As far as possible, the palliative care team has discussed with patient / health care proxy about goals of care / treatment preferences for patient. HISTORY:  
 
History obtained from: chart, nephew CHIEF COMPLAINT:  
Found down at home HPI/SUBJECTIVE: The patient is:  
[x] Verbal and participatory [] Non-participatory due to:  
 
80year old male found down at home/ apartment Unsure how long he was down. Previously lived in [place] (nephew reports this was not safe/ patient unable to care for self/ home unclean/cluttered) until recent move to [place] Patient's wife is buried in [place] so patient wanted to return to [place] Known to have poor short term memory Trouble with paranoia at times /afraid people are stealing from him/ poisoning him Two prior strokes with left side weakness Clinical Pain Assessment (nonverbal scale for severity on nonverbal patients):  
Clinical Pain Assessment Severity: 0 Activity (Movement): Lying quietly, normal position Duration: for how long has pt been experiencing pain (e.g., 2 days, 1 month, years) Frequency: how often pain is an issue (e.g., several times per day, once every few days, constant) FUNCTIONAL ASSESSMENT:  
 
Palliative Performance Scale (PPS): PPS: 30 
 
 
 PSYCHOSOCIAL/SPIRITUAL SCREENING:  
 
Palliative IDT has assessed this patient for cultural preferences / practices and a referral made as appropriate to needs (Cultural Services, Patient Advocacy, Ethics, etc.) Any spiritual / Spiritism concerns: 
[] Yes /  [x] No 
 Caregiver Burnout: 
[] Yes /  [x] No /  [] No Caregiver Present Anticipatory grief assessment:  
[x] Normal  / [] Maladaptive ESAS Anxiety: ESAS Depression:    
 
 
 REVIEW OF SYSTEMS:  
 
Positive and pertinent negative findings in ROS are noted above in HPI. The following systems were [x] reviewed / [] unable to be reviewed as noted in HPI Other findings are noted below. Systems: constitutional, ears/nose/mouth/throat, respiratory, gastrointestinal, genitourinary, musculoskeletal, integumentary, neurologic, psychiatric, endocrine. Positive findings noted below. Modified ESAS Completed by: provider Pain: 0 Dyspnea: 0 PHYSICAL EXAM:  
 
From RN flowsheet: 
Wt Readings from Last 3 Encounters:  
11/16/20 201 lb 4.5 oz (91.3 kg)  
11/13/20 207 lb 14.3 oz (94.3 kg) Blood pressure 105/63, pulse (!) 105, temperature 98 °F (36.7 °C), resp. rate 18, height 6' 2\" (1.88 m), weight 201 lb 4.5 oz (91.3 kg), SpO2 91 %. Pain Scale 1: Numeric (0 - 10) Pain Intensity 1: 0 Last bowel movement, if known:  
 
Constitutional: sleepy, arousable NAD Eyes: pupils equal, anicteric ENMT: no nasal discharge, moist mucous membranes Cardiovascular: irregular rhythm, distal pulses intact Respiratory: increased RR breathing not labored, symmetric Gastrointestinal: soft non-tender, +bowel sounds Musculoskeletal: no deformity, no tenderness to palpation Skin: warm, dry tattoo left arm Neurologic: following commands, moving all extremities Psychiatric: normal affect Talks about being poisoned, mistrust of staff/ food he's being given Oriented to name, place Insight to medical issues poor, says he has \"lots of questions\" but then does not offer any questions when given chance to ask them/ encouraged. HISTORY:  
 
Principal Problem: 
  NSTEMI (non-ST elevated myocardial infarction) ([facility] [address] [facility] (11/12/2020) No past medical history on file. No past surgical history on file. No family history on file. History reviewed, no pertinent family history. Social History Tobacco Use  Smoking status: Not on file Substance Use Topics  Alcohol use: Not on file No Known Allergies Current Facility-Administered Medications Medication Dose Route Frequency  [START ON 11/17/2020] vancomycin (VANCOCIN) 1750 mg in  ml infusion  1,750 mg IntraVENous Q24H  potassium bicarb-citric acid (EFFER-K) tablet 20 mEq  20 mEq Oral BID  sodium bicarbonate 150 mEq/1000 mL D5W (premix)   IntraVENous CONTINUOUS  
 haloperidol lactate (HALDOL) injection 2 mg  2 mg IntraVENous Q8H PRN  
 enoxaparin (LOVENOX) injection 90 mg  90 mg SubCUTAneous Q12H  
 insulin glargine (LANTUS) injection 15 Units  15 Units SubCUTAneous DAILY  sodium chloride (NS) flush 5-40 mL  5-40 mL IntraVENous Q8H  
 sodium chloride (NS) flush 5-40 mL  5-40 mL IntraVENous PRN  
 acetaminophen (TYLENOL) tablet 650 mg  650 mg Oral Q6H PRN Or  
 acetaminophen (TYLENOL) suppository 650 mg  650 mg Rectal Q6H PRN  polyethylene glycol (MIRALAX) packet 17 g  17 g Oral DAILY PRN  promethazine (PHENERGAN) tablet 12.5 mg  12.5 mg Oral Q6H PRN Or  
 ondansetron (ZOFRAN) injection 4 mg  4 mg IntraVENous Q6H PRN  
 lactobac ac& pc-s.therm-b.anim (SENDY Q/RISAQUAD)  1 Cap Oral DAILY  cefepime (MAXIPIME) 2 g in 0.9% sodium chloride (MBP/ADV) 100 mL MBP  2 g IntraVENous Q12H  
 insulin lispro (HUMALOG) injection   SubCUTAneous AC&HS  
 glucose chewable tablet 16 g  4 Tab Oral PRN  
 glucagon (GLUCAGEN) injection 1 mg  1 mg IntraMUSCular PRN  
 dextrose 10 % infusion 125-250 mL  125-250 mL IntraVENous PRN  Vancomycin Pharmacy Dosing   Other Rx Dosing/Monitoring  insulin lispro (HUMALOG) injection 5 Units  5 Units SubCUTAneous TIDAC  clopidogreL (PLAVIX) tablet 75 mg  75 mg Oral DAILY  sodium chloride (NS) flush 5-10 mL  5-10 mL IntraVENous PRN  
 
 
 
 LAB AND IMAGING FINDINGS:  
 
Lab Results Component Value Date/Time WBC 19.8 (H) 11/16/2020 02:33 AM  
 HGB 14.4 11/16/2020 02:33 AM  
 PLATELET 854 76/92/2472 02:33 AM  
 
Lab Results Component Value Date/Time Sodium 143 11/16/2020 02:33 AM  
 Potassium 3.1 (L) 11/16/2020 02:33 AM  
 Chloride 112 (H) 11/16/2020 02:33 AM  
 CO2 24 11/16/2020 02:33 AM  
 BUN 40 (H) 11/16/2020 02:33 AM  
 Creatinine 1.02 11/16/2020 02:33 AM  
 Calcium 9.0 11/16/2020 02:33 AM  
 Magnesium 2.1 11/13/2020 04:22 AM  
 Phosphorus 2.9 11/13/2020 09:00 AM  
  
Lab Results Component Value Date/Time Alk. phosphatase 75 11/13/2020 09:00 AM  
 Protein, total 7.0 11/13/2020 09:00 AM  
 Albumin 3.1 (L) 11/13/2020 09:00 AM  
 Globulin 3.9 11/13/2020 09:00 AM  
 
Lab Results Component Value Date/Time  
 aPTT 62.6 (H) 11/14/2020 08:50 AM  
  
No results found for: IRON, FE, TIBC, IBCT, PSAT, FERR No results found for: PH, PCO2, PO2 No components found for: [place] Lab Results Component Value Date/Time  (H) 11/14/2020 02:25 AM  
 CK - .6 (H) 11/12/2020 02:04 PM  
  
 
 
   
 
Total time: 80min Counseling / coordination time, spent as noted above: 60 
> 50% counseling / coordination?: yes Prolonged service was provided for  []30 min   []75 min in face to face time in the presence of the patient, spent as noted above. Time Start:  
Time End:  
Note: this can only be billed with 25406 (initial) or 55735 (follow up). If multiple start / stop times, list each separately.

## 2020-11-16 NOTE — PROGRESS NOTES
SPEECH PATHOLOGY BEDSIDE SWALLOW EVALUATION/DISCHARGE Patient: Ricardo Chacon (27 y.o. male) Date: 11/16/2020 Primary Diagnosis: [facility] [I21.4] Precautions: n/a ASSESSMENT : 
Based on the objective data described below, the patient presents with functional oropharyngeal phases of swallow on this date without any overt s/s of aspiration nor overt difficulty appreciated. Pt was noted to have belching and subsequent delayed cough, more indicative of reflux than a true prandial aspiration. Suspect presbyphagia over a true dysphagia, and thus some swallow changes expected given advanced age (aspiration considered normal, pharyngeal weakness, etc). Thus, recommend pt initiate diet as outlined below. Suspect pt is at baseline swallow function. Skilled acute therapy provided by a speech-language pathologist is not indicated at this time. PLAN : 
Recommendations: 
--Regular diet/thin liquids 
--Consider medical management of reflux 
--Straws ok 
--small sips/bites 
--alternate liquids/solids 
--meds as tolerated Discharge Recommendations: None SUBJECTIVE:  
Patient stated, \"You guys are trying to poison me. Pt very confused and worried about SLP poisoning patient and MRI being harmful to patient. OBJECTIVE:  
No past medical history on file. No past surgical history on file. Prior Level of Function/Home Situation:  
Home Situation Home Environment: Independent living One/Two Story Residence: One story Living Alone: Yes Support Systems: Family member(s) Patient Expects to be Discharged to[de-identified] Independent living facility Current DME Used/Available at Home: Raised toilet seat, Shower chair, Walker, Wheelchair Diet prior to admission: Regular diet/thin liquids Current Diet:  Regular diet/honey-thick liquids Cognitive and Communication Status: 
Neurologic State: Alert, Confused Orientation Level: Oriented to person, Oriented to place, Disoriented to situation, Disoriented to time Cognition: Impaired decision making, Poor safety awareness Perseveration: No perseveration noted Safety/Judgement: Decreased awareness of environment, Decreased awareness of need for assistance, Decreased awareness of need for safety, Decreased insight into deficits Oral Assessment: 
Oral Assessment Labial: No impairment(Formal oral mech not completed- informally [facility]) Dentition: Intact; Natural 
Oral Hygiene: oral mucosa moist and clear of secretions Lingual: No impairment Velum: No impairment Mandible: No impairment P.O. Trials: 
Patient Position: upright in bed Vocal quality prior to P.O.: No impairment Consistency Presented: Thin liquid; Solid How Presented: Self-fed/presented;Cup/sip;Spoon;Straw Bolus Acceptance: No impairment Propulsion: No impairment Oral Residue: None Initiation of Swallow: No impairment Laryngeal Elevation: Functional(for age) Aspiration Signs/Symptoms: Other (comment)(delayed cough; likely 2/2 reflux) Pharyngeal Phase Characteristics: No impairment, issues, or problems Oral Phase Severity: No impairment Pharyngeal Phase Severity : No impairment NOMS:  
The NOMS functional outcome measure was used to quantify this patient's level of swallowing impairment. Based on the NOMS, the patient was determined to be at level 7 for swallow function NOMS Swallowing Levels: 
Level 1 (CN): NPO Level 2 (CM): NPO but takes consistency in therapy Level 3 (CL): Takes less than 50% of nutrition p.o. and continues with nonoral feedings; and/or safe with mod cues; and/or max diet restriction Level 4 (CK): Safe swallow but needs mod cues; and/or mod diet restriction; and/or still requires some nonoral feeding/supplements Level 5 (CJ): Safe swallow with min diet restriction; and/or needs min cues Level 6 (CI): Independent with p.o.; rare cues; usually self cues; may need to avoid some foods or needs extra time Level 7 (CH): Independent for all p.o. KARTIK. (2003). National Outcomes Measurement System (NOMS): Adult Speech-Language Pathology User's Guide. Pain: 
Pain Scale 1: Numeric (0 - 10) Pain Intensity 1: 0 After treatment:  
Call bell within reach, Nursing notified and Caregiver / family present COMMUNICATION/EDUCATION:  
 
The patient's plan of care including recommendations, planned interventions, and recommended diet changes were discussed with: Registered nurse. Thank you for this referral. 
NANCY Akers Time Calculation: 15 mins

## 2020-11-16 NOTE — PROGRESS NOTES
Vanc trough drawn prior to 0230 admin Confusion/agitation improved from yesterday, no haldol administered.

## 2020-11-16 NOTE — PROGRESS NOTES
0730: Bedside and Verbal shift change report given to Storm Maxwell (oncoming nurse) by Yariel Grimes RN (offgoing nurse). Report included the following information SBAR, Kardex, Intake/Output, MAR, Recent Results and Cardiac Rhythm A-fib. 1046: Patient off floor to MRI with cardiac monitor and nurse. 1143: Patient back on floor and placed back on tele monitor. Verified with monitor tech. 1530: Bedside and Verbal shift change report given to Yisel Rodriguez RN (oncoming nurse) by Nav Kay RN (offgoing nurse). Report included the following information SBAR, Kardex, Intake/Output, MAR, Recent Results and Cardiac Rhythm A-fib. Patient is seen today with the concern that his hearing aid is cutting in and out. Upon observation the  wire was detaching form the  box. The  was replaced, (2xs left). Patient is notified of the $150 replacement  charge. The hearing aid's medium closed dome was also replaced. The hearing aid is found to be working well.     Patient is to follow up in 6 months for a routine check/cleaning.

## 2020-11-16 NOTE — PROGRESS NOTES
Day #4 of Vancomycin Indication:  ?sepsis of unknown etiology 
-Current regimen:  1.5 gm IV Q 24 hr Abx regimen:  vanc/cefepime Recent Labs 20 
4096 11/15/20 
0112 11/15/20 
0252 20 
0850 20 
0225 WBC 19.8*  --  21.8*  --  27.4*  
CREA 1.02 1.06  --  1.15 1.11  
BUN 40* 47*  --  49* 46* Est CrCl: ~50-60 ml/min; UO: unmeasured Temp (24hrs), Av.5 °F (36.9 °C), Min:97.8 °F (36.6 °C), Max:99.3 °F (37.4 °C) Cultures:  
 Blood #1: CoNS in 1/2 bottles, pending  Blood #2: NGTD 
 Blood #3: NGTD 
 Urine: NG, final 
 
Goal trough = 15 - 20 mcg/mL Recent trough history: 
 @ 0233 = 13 mcg/ml (24 hrs post dose) Plan: Will increase to 1750 mg IV q24h for predicted trough ~ 15.

## 2020-11-16 NOTE — PROGRESS NOTES
Transition of Care Plan 
RUR 14% MRI today Palliative met with patient and nephew-this morning [address] signed today --  -- (DDNR from [place] secured and scanned in chart) [address] Disposition  TBD-- AL or LTC facility with hospice Transportation   [facility] phone [phone] [address] [facility] Matthew  [phone] Patient will need to move from independent living at [facility] to [place] or LT facility with hospice as he lives alone and can not care for himself. Patient has periods  of paranoia. Cm talked with nephew  about options for discharge. AL vs LTC facility -- Patient  will be self pay as nephew states patient has resources and will not be eligible for medicaid. Nephew is talking with [facility] ,located in the [place],  to inquire if patient can be moved to AL receive  hospice. He has also spoken with Maximiliano Nguyen . Nephew will call CM this afternoon with an update. CM provided nephew with brochure to [facility] --[phone] who can  assist with securing safe placement for patient. Cm will provide nephew with LTC facility list-- CM did inform him of [facility] located across the street from [facility]. Brenda is continuing to secure guardianship for patient- See CM assessment-- Michael Munguia and Akila pittman in [facility] assisting Cm to follow and assist with safe discharge for patient. CM will make hospice referral when it is determined where patient will be living. UPDATE 3 pm 
Brenda spoke with admissions director of [address] [place] [address] Dr, North lory [phone] and I seems that patient will be able to secure an apartment at the [place] but paperwork needs to be completed. She said patient does not always pay his rent at the [facility] on time. She is aware that nephew is trying to secure guardianship. MAGY talked with [facility] and she confirmed that patient would likely be accepted to the AL. She said paperwork will need to be completed by nephew and she will need information from CM. She will call CM if nephew pursues the placement. Nephew is looking at another AL this afternoon. CM will continue to follow.

## 2020-11-16 NOTE — DIABETES MGMT
[address] [facility] PROGRAM FOR DIABETES HEALTH 
 
FOLLOW UP NOTE Presentation Kia De Paz is a 80 y.o. male who presented to the ED after being found down at his independent living facility. He was noted to have an elevated troponin, abnormal EKG, elevated lactate with MAN and admitted for medical management. HX: CVA with residual left-sided hemiparesis, Type 2 Diabetes, Dementia DX: NSTEMI, AMS, Sepsis, Rhabdomyolysis TX: Cardiology consult, ECHO/CT, blood cultures with IV abox, IV resucitation Current clinical course has been uncomplicated. Diabetes: Patient has known Type two diabetes, treated with metformin PTA. Family history unknown for diabetes. Consulted by Provider for advanced diabetes nursing assessment and care, specifically related to  
  
[x] Inpatient management strategy [x] Home management assessment Diabetes-related medical history Acute complications: Hyperglycemia Neurological complications Peripheral neuropathy Microvascular disease: None Macrovascular disease Cerebral vascular accident Other associated conditions: dementia and paranoia Diabetes medication history Drug class Currently in use Discontinued Never used Biguanide Metformin 1000 mg BID    
DDP-4 inhibitor Sulfonylurea Glipizide 5mg Daily Thiazolidinedione GLP-1 RA SGLT-2 inhibitors Basal insulin Fixed Dose  Combinations Bolus insulin Subjective \"Yes, I am a diabetic\". His nephew is visiting at the bedside today. Will be going down for MRI today. He is confused today. Patient resides in independent living at [facility] His nephew is Matthew Hughes- his next of KIN/emergency contact. and resides in [place]. Wife has passed away and does not have children Nephew reports that he was moved to [address] [facility] in September He did have a caregiver in [place] who assisted with transportation to doctor visits/ADLs. She may have a list of his medications but otherwise, he will be trying to get in touch with his PCP in [place]. He couldn't remember who his PCP was but is looking through records at home. Per nephew, he had an abscessed tooth 2-3 weeks ago, was given a prescription for an antibiotic but nephew is unclear if he filled and took Rx. Patient unable to report home diabetes management practices. Metformin and Glipizide bottles at bedside: 
Last PCP was Cruzito Benites MD 
[address] [facility] [place], [address] [facility] 
[phone] Current inpatient information ECHO Heparin gtt CT infection r/o, IV abox and blood cx MAN: GFR improving, now WNL A1C 8.2% Glucose 342 Leukocytosis (EBC 24.8) Small ketones in urine, 1000+ glucose in urine Initial anion gap 14, now 12 Lactate 3.6 Objective Physical exam 
General Confused, awake Vital Signs Visit Vitals /63 (BP 1 Location: Left arm, BP Patient Position: At rest) Pulse (!) 105 Temp 98 °F (36.7 °C) Resp 18 Ht 6' 2\" (1.88 m) Wt 91.3 kg (201 lb 4.5 oz) SpO2 91% BMI 25.84 kg/m² Skin  Warm and dry. Acanthosis noted along neckline. Heart   Regular rate and rhythm. No murmurs, rubs or gallops Lungs  Clear to auscultation without rales or rhonchi Extremities No foot wounds Laboratory Lab Results Component Value Date/Time Hemoglobin A1c 8.2 (H) 11/13/2020 04:22 AM  
 
No results found for: LDL, LDLC, DLDLP Lab Results Component Value Date/Time Creatinine 1.02 11/16/2020 02:33 AM  
 
Lab Results Component Value Date/Time  Sodium 143 11/16/2020 02:33 AM  
 Potassium 3.1 (L) 11/16/2020 02:33 AM  
 Chloride 112 (H) 11/16/2020 02:33 AM  
 CO2 24 11/16/2020 02:33 AM  
 Anion gap 7 11/16/2020 02:33 AM  
 Glucose 119 (H) 11/16/2020 02:33 AM  
 BUN 40 (H) 11/16/2020 02:33 AM  
 Creatinine 1.02 11/16/2020 02:33 AM  
 BUN/Creatinine ratio 39 (H) 11/16/2020 02:33 AM  
 GFR est AA >60 11/16/2020 02:33 AM  
 GFR est non-AA >60 11/16/2020 02:33 AM  
 Calcium 9.0 11/16/2020 02:33 AM  
 Bilirubin, total 1.3 (H) 11/13/2020 09:00 AM  
 Alk. phosphatase 75 11/13/2020 09:00 AM  
 Protein, total 7.0 11/13/2020 09:00 AM  
 Albumin 3.1 (L) 11/13/2020 09:00 AM  
 Globulin 3.9 11/13/2020 09:00 AM  
 A-G Ratio 0.8 (L) 11/13/2020 09:00 AM  
 ALT (SGPT) 54 11/13/2020 09:00 AM  
 
Lab Results Component Value Date/Time ALT (SGPT) 54 11/13/2020 09:00 AM  
 
 
Factors affecting BG pattern Factor Dose Comments Nutrition: 
Carb-controlled meals 60 grams/meal PO intake 15% Lactate 3.6-->2.3 (11/14/2020) Infection Leukocytosis improving on IV antibiotics Other: MAN GFR WNL ,improvement with hydration Blood glucose pattern Assessment and Plan Nursing Diagnosis Risk for unstable blood glucose pattern Nursing Intervention Domain 5250 Decision-making Support Nursing Interventions Examined current inpatient diabetes control Explored factors facilitating and impeding inpatient management Identified self-management practices impeding diabetes control Explored corrective strategies with patient and responsible inpatient provider Informed patient of rational for insulin strategy while hospitalized Evaluation Claire Aldana \"Slava\" Yudelka Kruger is an 80year old gentleman with type two diabetes on metformin admitted after being found down in his assisted living facility and found to have hyperglycemia, rhabdomyelitis, MAN, abnormal EKG with sepsis with lactic acidosis. Cardiology consulted and IV antibiotics with fluid resuscitation initiated. At this time A1C 8.2%, likely goal for age and mental status. At this time, glucose elevated and reasonable to start low dose basal insulin. Inpatient glucose goal closer to 180. BG trends over the weekend responded nicely to basal/bolus and correctional insulin regimen. AMBG today 119mg. dl.  Given advanced age, tight glycemic control not recommended. Recommendations Recommend 1. CONTINUE basal insulin 15units daily 2. Correctional insulin ACHS at normal sensitivity, start at a glucose of 200 
 
3. CONTINUE pre-meal insulin- 5units TID Humalog before meals (HOLD for reduced PO intake <50%) Discharge Planning 1. CM working on rehab vs. SNF placement 2. Since A1C 8.2% and advanced age, would recommend re-starting oral diabetic medications per PTA medication list.   
Billing Code(s) I personally reviewed chart, notes, data and current medications in the medical record, and examined the patient at bedside before making care recommendations. Thank you for including us in their care. I spent 20 minutes in direct patient care today for this patient. Time includes chart review, face to face with patient and collaboration with interdisciplinary care team. 
  
 
Nathalie Santamaria, CNS Program for Diabetes Health Access via [facility] [phone]

## 2020-11-16 NOTE — PROGRESS NOTES
NUTRITION 
 
RD PLAN OF CARE:  
Best practice alert received. Chart reviewed. Pt is admitted with NSTEMI (non-ST elevated myocardial infarction) ([facility] [address] [I21.4]. Seen by Palliative with the decision to focus on comfort measures only. Aggressive nutrition intervention is not indicated at this time. Will follow and assist as needed.  
 
Ros Salguero RD

## 2020-11-16 NOTE — PROGRESS NOTES
Pharmacist Note - Vancomycin Dosing Therapy day 4 Indication: ?sepsis of unknown etiology 
-small bilateral pleural effusions with mild bibasilar atelectasis on CT chest/abd 
-CXR with mod pulm vasc congestive changes 
-leukocytosis is persistently without neutrophilia since presentation 
-afebrile, tachycardic, satting well on RA, some tachypnea 
-persistent lactic acid elevation despite fluid resuscitation - AST elevated but other liver enzymes WNL as well as ammonia 
-AMS 
-UA (-)ve pyuria, bacteria Current regimen: 1500 mg q24h A Trough Level resulted at 13 mcg/mL which was obtained 24 hrs post-dose. The extrapolated \"true\" trough is approximately 13 mcg/mL based on the patient's known kinetics. Goal trough: 15 - 20 mcg/mL Plan: Continue current regimen. Pharmacy will continue to monitor this patient daily for changes in clinical status and renal function.

## 2020-11-16 NOTE — PROGRESS NOTES
ID Progress Note 2020 Subjective:  
 
Emphasis on comfort noted--seems reasonable--will continue with antibiotic therapy for now Objective: Antibiotics: 1. Vancomycin 2. Cefepime Vitals:  
Visit Vitals /71 (BP 1 Location: Left arm, BP Patient Position: At rest) Pulse 94 Temp 98 °F (36.7 °C) Resp 22 Ht 6' 2\" (1.88 m) Wt 91.3 kg (201 lb 4.5 oz) SpO2 98% BMI 25.84 kg/m² Tmax:  Temp (24hrs), Av.2 °F (36.8 °C), Min:98 °F (36.7 °C), Max:98.8 °F (37.1 °C) Exam:  Lungs:  clear to auscultation bilaterally Heart:  regular rate and rhythm Abdomen:  soft, non-tender. Bowel sounds normal. No masses,  no organomegaly Labs:     
Recent Labs 20 
7228 11/15/20 
3586 11/15/20 
0252 20 
0850 20 
0225 WBC 19.8*  --  21.8*  --  27.4* HGB 14.4  --  14.0  --  15.0   --  125*  --  167 BUN 40* 47*  --  49* 46* CREA 1.02 1.06  --  1.15 1.11 Cultures: No results found for: [facility] Lab Results Component Value Date/Time Culture result: No growth (<1,000 CFU/ML) 2020 08:06 PM  
 Culture result: NO GROWTH 4 DAYS 2020 02:28 PM  
 Culture result: NO GROWTH 3 DAYS 2020 04:22 AM  
 
 
Radiology:  
 
Line/Insert Date:        
 
Assessment: 1. Debility 2. Leukocytosis--improved 3. Tooth pain--improved Objective: 1. Continue current therapy for now Lia March MD

## 2020-11-16 NOTE — PROGRESS NOTES
Day #4 of Vancomycin Indication:  ?sepsis of unknown etiology 
-small bilateral pleural effusions with mild bibasilar atelectasis on CT chest/abd 
-CXR with mod pulm vasc congestive changes 
-leukocytosis is persistently without neutrophilia since presentation 
-afebrile, tachycardic, satting well on RA, some tachypnea 
-persistent lactic acid elevation despite fluid resuscitation - AST elevated but other liver enzymes WNL as well as ammonia 
-AMS 
-UA (-)ve pyuria, bacteria Current regimen:  1.5 gm IV Q 24 hr Abx regimen:  vanc/cefepime ID Following ?: NO 
Concomitant nephrotoxic drugs (requires more frequent monitoring): None Frequency of BMP?: Daily through  Recent Labs 20 
7472 11/15/20 
8486 11/15/20 
0252 20 
0850 20 
0225 WBC 19.8*  --  21.8*  --  27.4*  
CREA 1.02 1.06  --  1.15 1.11  
BUN 40* 47*  --  49* 46* Est CrCl: ~50-60 ml/min; UO: unmeasured Temp (24hrs), Av.5 °F (36.9 °C), Min:97.8 °F (36.6 °C), Max:99.3 °F (37.4 °C) Cultures:  
 Blood #1: CoNS in 1/2 bottles, pending  Blood #2: NGTD 
 Blood #3: NGTD 
 Urine: NG, final 
 
Goal trough = 15 - 20 mcg/mL Recent trough history: 
 @ 0233 = 13 mcg/ml (24 hrs post dose) Plan: Will increase to 1750 mg IV q24h for predicted trough ~ 15.

## 2020-11-16 NOTE — PROGRESS NOTES
Problem: Mobility Impaired (Adult and Pediatric) Goal: *Acute Goals and Plan of Care (Insert Text) Description: FUNCTIONAL STATUS PRIOR TO ADMISSION: Patient was modified independent using a rolling walker for functional mobility. HOME SUPPORT PRIOR TO ADMISSION: The patient lived in [address] [place] [address]. Physical Therapy Goals Initiated 11/16/2020 1. Patient will move from supine to sit and sit to supine , scoot up and down, and roll side to side in bed with minimal assistance/contact guard assist within 7 day(s). 2.  Patient will transfer from bed to chair and chair to bed with minimal assistance/contact guard assist using the least restrictive device within 7 day(s). 3.  Patient will perform sit to stand with minimal assistance/contact guard assist within 7 day(s). 4.  Patient will ambulate with minimal assistance/contact guard assist for 10 feet with the least restrictive device within 7 day(s). Outcome: Not Met PHYSICAL THERAPY EVALUATION Patient: Jessica Moreno (48 y.o. male) Date: 11/16/2020 Primary Diagnosis: NSTEMI (non-ST elevated myocardial infarction) ([facility] [address] [I21.4] Precautions:   Fall ASSESSMENT Based on the objective data described below, the patient presents with decreased activity tolerance, weakness, and cognitive deficits. Per chart, pt with h/o dementia and was found down at [facility]. Per palliative notes, pt possible planned for hospice and LTC. Hospitalist requesting for PT to see patient. Pt presenting with difficulty staying awake and following commands. Pt required max A to roll toward side. Pt quickly falling asleep and with increased WOB, deferred further mobility. RN aware. Current Level of Function Impacting Discharge (mobility/balance): max A for bed mobility. Functional Outcome Measure: The patient scored Total: 10/100 on the Barthel Index which is indicative of 90% impaired ability to care for basic self needs/dependency on others. Other factors to consider for discharge: poor activity tolerance, fall risk, decreased insight Patient will benefit from skilled therapy intervention to address the above noted impairments. PLAN : 
Recommendations and Planned Interventions: bed mobility training, transfer training, gait training, therapeutic exercises, neuromuscular re-education, patient and family training/education, and therapeutic activities Frequency/Duration: Patient will be followed by physical therapy:  3 times a week to address goals. Recommendation for discharge: (in order for the patient to meet his/her long term goals) Therapy up to 5 days/week in SNF setting vs LTC pending progress/participation in therapy/cognition improvements If pt were to d/c home, would benefit from HHPT and require assistance/supervision for 2 person A for all mobility or gianfranco lift as pt is a fall risk This discharge recommendation: 
Has not yet been discussed the attending provider and/or case management IF patient discharges home will need the following DME: hospital bed, mechanical lift, and wheelchair SUBJECTIVE:  
Patient stated Iram Petit are you.  OBJECTIVE DATA SUMMARY:  
HISTORY:   
No past medical history on file. No past surgical history on file. Personal factors and/or comorbidities impacting plan of care: PMH, cognition Home Situation Home Environment: Independent living One/Two Story Residence: One story Living Alone: Yes Support Systems: Family member(s) Patient Expects to be Discharged to[de-identified] Independent living facility Current DME Used/Available at Home: Raised toilet seat, Shower chair, Walker, Wheelchair EXAMINATION/PRESENTATION/DECISION MAKING:  
Critical Behavior: 
Neurologic State: Alert, Confused Orientation Level: Oriented to person, Oriented to place, Disoriented to situation, Disoriented to time Cognition: Impaired decision making, Poor safety awareness Safety/Judgement: Decreased awareness of environment, Decreased awareness of need for assistance, Decreased awareness of need for safety, Decreased insight into deficits Hearing: Auditory Auditory Impairment: Hard of hearing, bilateral 
Skin:  intact Edema: none noted Range Of Motion: 
AROM: Grossly decreased, non-functional 
  
  
  
PROM: Grossly decreased, non-functional 
  
  
  
Strength:   
Strength: Grossly decreased, non-functional 
  
  
  
  
  
  
Tone & Sensation:  
Tone: Abnormal 
  
  
  
  
Sensation: Impaired Coordination: 
Coordination: Grossly decreased, non-functional 
Vision:  
  
Functional Mobility: 
Bed Mobility: 
Rolling: Maximum assistance Scooting: Maximum assistance Balance:  
Sitting: Impaired; With support Functional Measure: 
Barthel Index: 
 
Bathin Bladder: 5 Bowels: 5 Groomin Dressin Feedin Mobility: 0 Stairs: 0 Toilet Use: 0 Transfer (Bed to Chair and Back): 0 Total: 10/100 The Barthel ADL Index: Guidelines 1. The index should be used as a record of what a patient does, not as a record of what a patient could do. 2. The main aim is to establish degree of independence from any help, physical or verbal, however minor and for whatever reason. 3. The need for supervision renders the patient not independent. 4. A patient's performance should be established using the best available evidence. Asking the patient, friends/relatives and nurses are the usual sources, but direct observation and common sense are also important. However direct testing is not needed. 5. Usually the patient's performance over the preceding 24-48 hours is important, but occasionally longer periods will be relevant. 6. Middle categories imply that the patient supplies over 50 per cent of the effort. 7. Use of aids to be independent is allowed. Nikki Murillo., Barthel, D.W. (1145). Functional evaluation: the Barthel Index. [address] [facility] (14)2. LEIGH Ureña, Ramone Avilez., Ahsan Mead., Emy Myers, [address] (1999). Measuring the change indisability after inpatient rehabilitation; comparison of the responsiveness of the Barthel Index and Functional [place] Measure. Journal of Neurology, Neurosurgery, and Psychiatry, 66(4), 849-783. STEPHANY Yuen, SOO Garcia, & Demarcus Cary M.A. (2004.) Assessment of post-stroke quality of life in cost-effectiveness studies: The usefulness of the Barthel Index and the EuroQoL-5D. [facility], 13, [phone] Physical Therapy Evaluation Charge Determination History Examination Presentation Decision-Making HIGH Complexity :3+ comorbidities / personal factors will impact the outcome/ POC  LOW Complexity : 1-2 Standardized tests and measures addressing body structure, function, activity limitation and / or participation in recreation  MEDIUM Complexity : Evolving with changing characteristics  Other outcome measures barthel  HIGH Based on the above components, the patient evaluation is determined to be of the following complexity level: LOW Activity Tolerance:  
Poor After treatment patient left in no apparent distress:  
Supine in bed, Call bell within reach, and Side rails x 3 
 
COMMUNICATION/EDUCATION:  
The patients plan of care was discussed with: Registered nurse. Fall prevention education was provided and the patient/caregiver indicated understanding. and Patient/family have participated as able in goal setting and plan of care. Thank you for this referral. 
Angela Jaimes, PT, DPT Time Calculation: 10 mins

## 2020-11-16 NOTE — PROGRESS NOTES
Cardiology Consult/Progress Note 11/12/2020  2:06 PM  
 
Subjective: 
Still confused and difficult to understand. Denies pain, dyspnea. Says he ate a great breakfast.  
 
Continue supportive care. Plavix, lovenox. Poor OAC candidate but recommended for LV apical thrombus temporarily. If going to NH/rehab would continue anticoagulation. Lovenox is fine for now. Assessment and PLAN 1. Acute Rhabdomyolysis 
 - found down, suspect longer than 24 hours - CK 1347, CKMB 110, index 8.2 
 -  IVF therapy 2. NSTEMI 
 -  Trop initially 54 trending down to 33. I suspect he has a high burden of obstructive severe coronary artery disease. Troponin release may be demand related 
 - EKG with Afib RVR, PVC and underlying conduction delay with bifascicular block -will not treat as STEMI given lack of ongoing chest discomfort. ST changes could represent secondary repolarization changes from intraventricular conduction delay/bifascicular block 
 - Echo demonstrates severe LV dysfunction, severe apical hypokinesis with with LV apical thrombus. 3.  Suspected sepsis in conjunction with severe LV systolic dysfunction - LAC 3.6 
 - WBC 21.9 
 - pan Cx 4. Afib 
 - Rate controlled. Dd low dose BB 
 - Not a candidate for long-term [place]. For short-term given his LV apical thrombus with suggest 6 to 8 weeks of anticoagulation if he goes to a nursing home where he can be monitored closely. 5. MAN 
 - suspect from dehydration 
 - IVF 6. DM II 
 - A1c 8.2 
 - SSI per Primary team 
7. Dementia 
 - seems baseline 8. H/o CVA 
 - left sided affect 
 - on Plavix 9. HFrEF: Initiate Low dose BB and if tolerated well add low dose ACEi. Mr. Mera Wise is an 70-year-old gentleman with mild cognitive dysfunction, prior stroke with residual left sided hemiparesis who presented to the emergency room after found down by the squad.   He does not report any chest discomfort or shortness of breath at arrival.  His ECG suggests underlying intraventricular conduction delay with ST segment shifts which may suggest either remote or possible recent infarct/injury versus secondary changes to intraventricular conduction delay. Presence of elevated troponin and CK-MB index suggest likely myocardial injury. Overall this picture is suggestive of more chronic ischemic cardiomyopathy with possible superimposed acute ischemia. I suspect based on his echocardiogram that patient has severe/critical multivessel disease. Ideally would consider anticoagulant and aspirin at discharge. However given recent fall uncertain if he is a good candidate for long-term anticoagulation. I do not feel that patient understood our discussion regarding his cardiac condition or need for above-mentioned medical therapy. I suggested that we discussed final goals of care with him. If he ends up going in a nursing home where he can be monitored closely, will strongly recommend use of at least short-term 6 to 8 weeks of anticoagulation mostly for LV apical thrombus. [x]    High complexity decision making was performed 
[x]    Patient is at high-risk of decompensation with multiple organ involvement Referring physician Dr. Chance Kay,: 
HPI: Catalina Pablo is a 80 y.o. male who was brought to [facility] emergency room after being found down. Lucien was called by his neighbors who hold noises from his house overnight and earlier this morning. When the lucien reached the site patient was found on the floor leaning his head on the wall. He was not unconscious. He said that he was on the floor overnight and was unable to move. He has prior history of stroke with residual left hemiparesis although has been living in independent living. He denies any symptoms of syncope, chest discomfort, significant shortness of breath.   Lucien performed an EKG on the way and were concerned about possible ST elevation myocardial infarction. Hence cardiology was consulted. Investigation ECG: ECG shows atrial fibrillation with rapid ventricular rate, PVCs, underlying intraventricular conduction delay with bifascicular block pattern. There are ST segment changes which may suggest ischemia/infarction. However in the absence of any active chest discomfort I highly doubt that these reflect true MI despite ST segment being concordant in lead V2 and V3. ST segment changes in V4 are discordant and less than 5 mm. There are no obvious reciprocal changes noted. Echocardiogram: Not performed White cell count 22.8 Troponin 3 PM: 54 Total CK 1365; CK-; CK-MB index 8.2 Review of Symptoms: 
Could not be performed because of patient being unable to answer all the questions. Patient Active Problem List  
 Diagnosis Date Noted  [facility]STEM[facility]non-ST elevated [facility] 11/12/2020 UNKNOWN 
No past medical history on file. No past surgical history on file. Social History Socioeconomic History  Marital status:  Spouse name: Not on file  Number of children: Not on file  Years of education: Not on file  Highest education level: Not on file No family history on file. Current Facility-Administered Medications Medication Dose Route Frequency  [START ON 11/17/2020] vancomycin (VANCOCIN) 1750 mg in  ml infusion  1,750 mg IntraVENous Q24H  
 haloperidol lactate (HALDOL) injection 2 mg  2 mg IntraVENous Q8H PRN  
 enoxaparin (LOVENOX) injection 90 mg  90 mg SubCUTAneous Q12H  
 insulin glargine (LANTUS) injection 15 Units  15 Units SubCUTAneous DAILY  sodium chloride (NS) flush 5-40 mL  5-40 mL IntraVENous Q8H  
 sodium chloride (NS) flush 5-40 mL  5-40 mL IntraVENous PRN  
 acetaminophen (TYLENOL) tablet 650 mg  650 mg Oral Q6H PRN  Or  
 acetaminophen (TYLENOL) suppository 650 mg  650 mg Rectal Q6H PRN  
  polyethylene glycol (MIRALAX) packet 17 g  17 g Oral DAILY PRN  promethazine (PHENERGAN) tablet 12.5 mg  12.5 mg Oral Q6H PRN Or  
 ondansetron (ZOFRAN) injection 4 mg  4 mg IntraVENous Q6H PRN  
 lactobac ac& pc-s.therm-b.anim (SENDY Q/RISAQUAD)  1 Cap Oral DAILY  cefepime (MAXIPIME) 2 g in 0.9% sodium chloride (MBP/ADV) 100 mL MBP  2 g IntraVENous Q12H  
 insulin lispro (HUMALOG) injection   SubCUTAneous AC&HS  
 glucose chewable tablet 16 g  4 Tab Oral PRN  
 glucagon (GLUCAGEN) injection 1 mg  1 mg IntraMUSCular PRN  
 dextrose 10 % infusion 125-250 mL  125-250 mL IntraVENous PRN  Vancomycin Pharmacy Dosing   Other Rx Dosing/Monitoring  insulin lispro (HUMALOG) injection 5 Units  5 Units SubCUTAneous TIDAC  clopidogreL (PLAVIX) tablet 75 mg  75 mg Oral DAILY  sodium chloride (NS) flush 5-10 mL  5-10 mL IntraVENous PRN None No Known Allergies Labs:  
Recent Results (from the past 24 hour(s)) GLUCOSE, POC Collection Time: 11/15/20 10:33 PM  
Result Value Ref Range Glucose (POC) 103 (H) 65 - 100 mg/dL Performed by Georgia Ruiz CBC WITH AUTOMATED DIFF Collection Time: 11/16/20  2:33 AM  
Result Value Ref Range WBC 19.8 (H) 4.1 - 11.1 K/uL  
 RBC 4.72 4.10 - 5.70 M/uL  
 HGB 14.4 12.1 - 17.0 g/dL HCT 42.6 36.6 - 50.3 % MCV 90.3 80.0 - 99.0 FL  
 MCH 30.5 26.0 - 34.0 PG  
 MCHC 33.8 30.0 - 36.5 g/dL  
 RDW 13.9 11.5 - 14.5 % PLATELET 128 655 - 373 K/uL MPV 12.9 8.9 - 12.9 FL  
 NRBC 0.0 0  WBC ABSOLUTE NRBC 0.00 0.00 - 0.01 K/uL NEUTROPHILS 62 32 - 75 % LYMPHOCYTES 25 12 - 49 % MONOCYTES 12 5 - 13 % EOSINOPHILS 0 0 - 7 % BASOPHILS 0 0 - 1 % IMMATURE GRANULOCYTES 1 (H) 0.0 - 0.5 % ABS. NEUTROPHILS 12.2 (H) 1.8 - 8.0 K/UL  
 ABS. LYMPHOCYTES 5.0 (H) 0.8 - 3.5 K/UL  
 ABS. MONOCYTES 2.4 (H) 0.0 - 1.0 K/UL  
 ABS. EOSINOPHILS 0.0 0.0 - 0.4 K/UL  
 ABS. BASOPHILS 0.0 0.0 - 0.1 K/UL ABS. IMM. GRANS. 0.2 (H) 0.00 - 0.04 K/UL  
 DF SMEAR SCANNED    
 RBC COMMENTS ATYPICAL LYMPHOCYTES PRESENT 
YECENIA CELLS 1+ RBC COMMENTS POLYCHROMASIA PRESENT 
    
METABOLIC PANEL, BASIC Collection Time: 11/16/20  2:33 AM  
Result Value Ref Range Sodium 143 136 - 145 mmol/L Potassium 3.1 (L) 3.5 - 5.1 mmol/L Chloride 112 (H) 97 - 108 mmol/L  
 CO2 24 21 - 32 mmol/L Anion gap 7 5 - 15 mmol/L Glucose 119 (H) 65 - 100 mg/dL BUN 40 (H) 6 - 20 MG/DL Creatinine 1.02 0.70 - 1.30 MG/DL  
 BUN/Creatinine ratio 39 (H) 12 - 20 GFR est AA >60 >60 ml/min/1.73m2 GFR est non-AA >60 >60 ml/min/1.73m2 Calcium 9.0 8.5 - 10.1 MG/DL Neri Kartik Collection Time: 11/16/20  2:33 AM  
Result Value Ref Range Vancomycin,trough 13.0 (H) 5.0 - 10.0 ug/mL Reported dose date NOT PROVIDED Reported dose time: NOT PROVIDED Reported dose: NOT PROVIDED UNITS  
GLUCOSE, POC Collection Time: 11/16/20  6:56 AM  
Result Value Ref Range Glucose (POC) 119 (H) 65 - 100 mg/dL Performed by Bruno STRICKLAND, POC Collection Time: 11/16/20 11:46 AM  
Result Value Ref Range Glucose (POC) 230 (H) 65 - 100 mg/dL Performed by Telma Delgadillo, POC Collection Time: 11/16/20  5:09 PM  
Result Value Ref Range Glucose (POC) 127 (H) 65 - 100 mg/dL Performed by Marilou Guillory Intake/Output Summary (Last 24 hours) at 11/16/2020 1839 Last data filed at 11/16/2020 1309 Gross per 24 hour Intake 1213.8 ml Output  Net 1213.8 ml Patient Vitals for the past 24 hrs: 
 Temp Pulse Resp BP SpO2  
11/16/20 1547 98 °F (36.7 °C) 94 22 111/71 98 % 11/16/20 1143 98 °F (36.7 °C) (!) 102 22 120/65 93 % 11/16/20 0824  (!) 105     
11/16/20 0746 98 °F (36.7 °C) 95 18 105/63 91 % 11/16/20 0455 98.8 °F (37.1 °C) (!) 103 18 109/75 95 % 11/15/20 2317 98.2 °F (36.8 °C) (!) 107 18 (!) 107/55 96 % 11/15/20 2022 98 °F (36.7 °C) (!) 103 18 (!) 162/119 96 % General:    Not clearly oriented  Alert this am.  
Psychiatric:    Unable to assess Eye/ENT:      Pupils equal, No asymmetry, Conjunctival pink. Able to hear voice at normal amplitude. Evidence of trauma to the scalp Lungs:      Visibly symmetric chest expansion, No palpable tenderness. Clear to auscultation bilaterally. Heart[de-identified]     Variable S1 and S2, irregular rate. Wide split second heart sound. systolic murmur, no click, rub or gallop. No JVD, Normal palpable peripheral pulses. No cyanosis Abdomen:     Soft, non-tender. Bowel sounds normal. No masses,  No   
  organomegaly. Extremities:   Extremities normal, atraumatic, no edema. Neurologic:   Left upper and lower extremity weakness Silva Lewis MD 
 
11/16/2020  
9:10 AM 
  
 
[facility] Office:   [address] [facility] Office: 
[address] [facility]    [address] P: [id number]    P: [phone] F: [phone]    F: [phone]

## 2020-11-16 NOTE — PROGRESS NOTES
SLP Contact Note SLP evaluation complete. Recommend regular diet/thin liquids. Consider medical management of possible reflux. Straw are okay. Full note to follow. Thank you, Kell Ann M.Ed, CCC-SLP Speech-Language Pathologist

## 2020-11-16 NOTE — PROGRESS NOTES
Problem: Diabetes Self-Management Goal: *Disease process and treatment process Description: Define diabetes and identify own type of diabetes; list 3 options for treating diabetes. Outcome: Progressing Towards Goal 
Goal: *Incorporating nutritional management into lifestyle Description: Describe effect of type, amount and timing of food on blood glucose; list 3 methods for planning meals. Outcome: Progressing Towards Goal 
Goal: *Using medications safely Description: State effect of diabetes medications on diabetes; name diabetes medication taking, action and side effects. Outcome: Progressing Towards Goal 
Goal: *Monitoring blood glucose, interpreting and using results Description: Identify recommended blood glucose targets  and personal targets. Outcome: Progressing Towards Goal 
Goal: *Developing strategies to address psychosocial issues Description: Describe feelings about living with diabetes; identify support needed and support network Outcome: Progressing Towards Goal 
  
Problem: Patient Education: Go to Patient Education Activity Goal: Patient/Family Education Outcome: Progressing Towards Goal 
  
Problem: Pressure Injury - Risk of 
Goal: *Prevention of pressure injury Description: Document Eric Scale and appropriate interventions in the flowsheet. Outcome: Progressing Towards Goal 
Note: Pressure Injury Interventions: 
Sensory Interventions: Assess changes in LOC, Avoid rigorous massage over bony prominences, Check visual cues for pain, Keep linens dry and wrinkle-free, Minimize linen layers, Maintain/enhance activity level, Pressure redistribution bed/mattress (bed type) Moisture Interventions: Absorbent underpads, Apply protective barrier, creams and emollients, Check for incontinence Q2 hours and as needed, Minimize layers Activity Interventions: Increase time out of bed, Pressure redistribution bed/mattress(bed type), PT/OT evaluation Mobility Interventions: Pressure redistribution bed/mattress (bed type) Nutrition Interventions: Document food/fluid/supplement intake Friction and Shear Interventions: Apply protective barrier, creams and emollients, Minimize layers, HOB 30 degrees or less Problem: Patient Education: Go to Patient Education Activity Goal: Patient/Family Education Outcome: Progressing Towards Goal 
  
Problem: Falls - Risk of 
Goal: *Absence of Falls Description: Document Richard Merlin Fall Risk and appropriate interventions in the flowsheet. Outcome: Progressing Towards Goal 
Note: Fall Risk Interventions: 
Mobility Interventions: Communicate number of staff needed for ambulation/transfer, Bed/chair exit alarm, PT Consult for mobility concerns, Utilize gait belt for transfers/ambulation, Utilize walker, cane, or other assistive device Mentation Interventions: Adequate sleep, hydration, pain control, Bed/chair exit alarm, Reorient patient, More frequent rounding, Evaluate medications/consider consulting pharmacy, Door open when patient unattended, Room close to nurse's station Medication Interventions: Bed/chair exit alarm, Evaluate medications/consider consulting pharmacy, Patient to call before getting OOB Elimination Interventions: Call light in reach, Patient to call for help with toileting needs, Toilet paper/wipes in reach, Toileting schedule/hourly rounds, Urinal in reach, Bed/chair exit alarm History of Falls Interventions: Consult care management for discharge planning, Door open when patient unattended, Evaluate medications/consider consulting pharmacy, Room close to nurse's station Problem: Patient Education: Go to Patient Education Activity Goal: Patient/Family Education Outcome: Progressing Towards Goal

## 2020-11-16 NOTE — PROGRESS NOTES
Hospitalist Progress Note Fabiano Lawler MD 
Answering service: [phone] from in house phone Date of Service:  2020 NAME:  Pattie Casper :  1931 MRN:  [id number] Admission Summary: As per initial admission summary This is an 25-year-old man with past medical history significant for dementia, status post CVA, and type 2 diabetes, who was in his usual state of health until the day of his presentation at the emergency room when the patient was found on the floor at the assisted living facility where the patient resides. According to report, the patient was feeling hot all night long, he got up, felt weak and his legs gave out on him, the patient collapsed to the floor and unable to get up. His neighbor heard him screaming all night long. EMS was called. When the EMS arrived at the scene, the patient's EKG shows possible ST elevation myocardial infarction. This was called to the emergency room and the patient was brought to the emergency room as a code ST-elevation myocardial infarction. When the patient arrived at the emergency room, the patient's EKG was reviewed by the cardiologist and code ST-elevation myocardial infarction was canceled. The patient has significant lab abnormalities including significant leukocytosis and elevated troponin level. The patient remained confused in the emergency room, unable to provide good history. The patient was seen by the cardiologist in the emergency room. Conservative treatment was advised. The patient was also seen by the intensivist for evaluation for ICU admission. The patient is not a candidate for ICU admission according to the intensivist.  The patient was subsequently referred to the hospitalist service for evaluation for admission. The patient has no record of prior admission to this hospital. 
 
Interval history / Subjective: Patient seen for Follow up of NSTEMI Patient seen and examined by the bedside, Labs, images and notes reviewed Cardiology following. Unable to obtain any meaning ful history from patient. Patient with no complaints,  
Consulted palliative as well. Patient is sick. D/w emergency contact listed (Love Tabares). He told me that he is not poa. Talked to in detail . He was very appreciative. Told him that patient is critically sick and may need to be transferred to ICU. He mentioned that may be a DNR? But patient wants resuscitative measures. CODE status not clear. palliative consulted 11/14: looks more alert today. Apparently having some hallucinations. Blood culture positive for gram positive cocci 1/2. Already on vancomycin. Worsening leucocytosis. ID consult pending 11/15 improving leukocytosis. Afebrile. Poor oral intake. If continues to have poor oral intake may need to put tube feeding. ID following. Overall high risk of deterioration . Heparin drip stopped. Started on lovenox. D/W emergencuy contact (stacy Alaniz Boards in detail. Updated him. He was very appreciative 11/16: looks more alert today but appears to be confused. Leucocytosis is getting better. No fever spikes. No acute events overnight. MRI today . PT/OT consulted Assessment & Plan: 1. Non-ST elevation myocardial infarction:   
on Plavix,  
heparin drip was stopped  as per cardiology Echocardiogram , · Estimated LVEF is <15%. Mildly dilated left ventricle. Decreased wall thickness. Severely and segmentally reduced systolic function. Moderate (grade 2) left ventricular diastolic dysfunction. · Large apical thrombus measuring 8 by 12 mm in size. Associated with apical severe hypokinesis. likely ischemic cardiomyopathy Not a good candidate for any invasive therapies, recommended conservative management Cardiology following On lovenox #LV apical thrombus May need 6-8 weeks of anticoagulation as per cardiology Not a good candidate for long term anticoagulation Will appreciate cardiology recommendations before discharge regarding oral anticoagulation On lovenox 2. Sepsis:  persistent  
 significant leukocytosis, elevated lactic acid level, and tachycardia. No clear source of infection at this time. CT chest , Small bilateral pleural effusions with mild bibasilar atelectasis. 
  CT scan of the abdomen and pelvis, negative for any acute changes  
 on vancomycin and cefepime. We will await blood culture results. Patient already on vancomycin Today some downtrend of leucocytosis As per CT maxifacial surgery: Right third maxillary molar demonstrates dental caries. There is a periapical lucency around the right third maxillary molar may represent a periapical 
abscess as well as inflammation in the adjacent gingiva. ID consulted , following, blood culture gram positive cocci 1/2 #lactic acidosis, improving Low bicarb Started on bicarb drip Likely due to underlying infection #hyperkalemia, resolved BMP in AM 
 
3.   atrial fibrillation: It is not clear whether the patient has history of atrial fibrillation. Cardiology following Not a good candidate for long term anticoagulation . 4.  Acute kidney injury:  resolved This is most likely due to volume depletion. We will carry out fluid therapy and monitor the patient's renal function. BMP in AM 
If continues to get wore will involve nephrology 5. Acute rhabdomyolysis:  improved This is as a result of the patient being found on the floor of his apartment at the assisted living facility. We will carry out fluid therapy and monitor the patient closely. . Improving 6. Abnormal liver function tests: The patient may require Hepatology consult if the abnormality is persistent or getting worse. 7.  Type 2 diabetes with hyperglycemia:  
 The patient will be placed on sliding scale with insulin coverage. Added lantus 15 units . Need further adjustment. 8.  Syncope: MRI and MRA of the brain to evaluate the patient for stroke as a possible cause of syncope, especially in this patient who has had a stroke in the past.   
 
9.  Acute delirium:  waxing and waning This is most likely due to metabolic event. CT scan of the head is negative. Will monitor for now 10. Fall:  The patient was found on the floor at the assisted living facility. CT scan of the head is negative. CT scan of the cervical spine did not show any acute fracture. PT/OT consult 11. Dementia:  The patient most likely has underlying memory impairment. We will carry out supportive treatment. We will await the results of MRI of the brain to evaluate the patient for stroke. 12.  Hypercalcemia:   
IV fluids Code status: Full code DVT prophylaxis: on heparin drip Care Plan discussed with: Patient/Family Disposition: TBD Hospital Problems  Date Reviewed: 11/12/2020 Codes Class Noted POA * (Principal) NSTEMI (non-ST elevated myocardial infarction) ([facility] [address] ICD-10-CM: I21.4 ICD-9-CM: 410.70  11/12/2020 Yes Review of Systems: A comprehensive review of systems was negative except for that written in the HPI. Vital Signs:  
 Last 24hrs VS reviewed since prior progress note. Most recent are: 
Visit Vitals /63 (BP 1 Location: Left arm, BP Patient Position: At rest) Pulse (!) 105 Temp 98 °F (36.7 °C) Resp 18 Ht 6' 2\" (1.88 m) Wt 91.3 kg (201 lb 4.5 oz) SpO2 91% BMI 25.84 kg/m² Intake/Output Summary (Last 24 hours) at 11/16/2020 1045 Last data filed at 11/15/2020 2022 Gross per 24 hour Intake 2244.94 ml Output  Net 2244.94 ml Physical Examination:  
I had a face to face encounter with this patient and independently examined them on November 16, 2020 as outlined below: 
     
Constitutional:  ill appearing patient , alert today ENT:  Oral mucous moist, oropharynx benign. Neck supple, Resp:  CTA bilaterally. No wheezing/rhonchi/rales. No accessory muscle use CV:  Regular rhythm, normal rate, no murmurs, gallops, rubs GI:  Soft, non distended, non tender. normoactive bowel sounds, no hepatosplenomegaly Musculoskeletal:  No edema, warm, 2+ pulses throughout Data Review:  
 Review and/or order of clinical lab test 
Review and/or order of tests in the radiology section of CPT Review and/or order of tests in the medicine section of [facility] Labs:  
 
Recent Labs 11/16/20 
7821 11/15/20 
0252 WBC 19.8* 21.8* HGB 14.4 14.0  
HCT 42.6 41.8  125* Recent Labs 11/16/20 
3086 11/15/20 
0419 11/14/20 
2687  136 138  
K 3.1* 5.3* 4.1 * 116* 109* CO2 24 15* 19* BUN 40* 47* 49* CREA 1.02 1.06 1.15  
* 110* 228* CA 9.0 8.8 9.6 No results for input(s): ALT, AP, TBIL, TBILI, TP, ALB, GLOB, GGT, AML, LPSE in the last 72 hours. No lab exists for component: SGOT, GPT, AMYP, HLPSE Recent Labs 11/14/20 
0850 11/14/20 0225 11/13/20 
1826 APTT 62.6* 59.8* 55.6* No results for input(s): FE, TIBC, PSAT, FERR in the last 72 hours. No results found for: FOL, RBCF No results for input(s): PH, PCO2, PO2 in the last 72 hours. Recent Labs 11/14/20 
0225 11/13/20 
1537 *  --   
TROIQ  --  21.60* No results found for: CHOL, CHOLX, CHLST, CHOLV, HDL, HDLP, LDL, LDLC, DLDLP, TGLX, TRIGL, TRIGP, CHHD, CHHDX Lab Results Component Value Date/Time Glucose (POC) 119 (H) 11/16/2020 06:56 AM  
 Glucose (POC) 103 (H) 11/15/2020 10:33 PM  
 Glucose (POC) 133 (H) 11/15/2020 04:31 PM  
 Glucose (POC) 167 (H) 11/15/2020 11:23 AM  
 Glucose (POC) 117 (H) 11/15/2020 07:12 AM  
 
Lab Results Component Value Date/Time  Color YELLOW/STRAW 11/12/2020 08:06 PM  
 Appearance CLEAR 11/12/2020 08:06 PM  
 Specific gravity 1.025 11/12/2020 08:06 PM  
 pH (UA) 5.0 11/12/2020 08:06 PM  
 Protein 100 (A) 11/12/2020 08:06 PM  
 Glucose >1,000 (A) 11/12/2020 08:06 PM  
 Ketone 15 (A) 11/12/2020 08:06 PM  
 Bilirubin Negative 11/12/2020 08:06 PM  
 Urobilinogen 0.2 11/12/2020 08:06 PM  
 Nitrites Negative 11/12/2020 08:06 PM  
 Leukocyte Esterase Negative 11/12/2020 08:06 PM  
 Epithelial cells FEW 11/12/2020 08:06 PM  
 Bacteria Negative 11/12/2020 08:06 PM  
 WBC 0-4 11/12/2020 08:06 PM  
 RBC 10-20 11/12/2020 08:06 PM  
 
 
 
Medications Reviewed:  
 
Current Facility-Administered Medications Medication Dose Route Frequency  [START ON 11/17/2020] vancomycin (VANCOCIN) 1750 mg in  ml infusion  1,750 mg IntraVENous Q24H  potassium bicarb-citric acid (EFFER-K) tablet 20 mEq  20 mEq Oral BID  
 haloperidol lactate (HALDOL) injection 2 mg  2 mg IntraVENous Q8H PRN  
 enoxaparin (LOVENOX) injection 90 mg  90 mg SubCUTAneous Q12H  
 insulin glargine (LANTUS) injection 15 Units  15 Units SubCUTAneous DAILY  sodium chloride (NS) flush 5-40 mL  5-40 mL IntraVENous Q8H  
 sodium chloride (NS) flush 5-40 mL  5-40 mL IntraVENous PRN  
 acetaminophen (TYLENOL) tablet 650 mg  650 mg Oral Q6H PRN Or  
 acetaminophen (TYLENOL) suppository 650 mg  650 mg Rectal Q6H PRN  polyethylene glycol (MIRALAX) packet 17 g  17 g Oral DAILY PRN  promethazine (PHENERGAN) tablet 12.5 mg  12.5 mg Oral Q6H PRN Or  
 ondansetron (ZOFRAN) injection 4 mg  4 mg IntraVENous Q6H PRN  
 lactobac ac& pc-s.therm-b.anim (SENDY Q/RISAQUAD)  1 Cap Oral DAILY  cefepime (MAXIPIME) 2 g in 0.9% sodium chloride (MBP/ADV) 100 mL MBP  2 g IntraVENous Q12H  
 insulin lispro (HUMALOG) injection   SubCUTAneous AC&HS  
 glucose chewable tablet 16 g  4 Tab Oral PRN  
 glucagon (GLUCAGEN) injection 1 mg  1 mg IntraMUSCular PRN  
 dextrose 10 % infusion 125-250 mL  125-250 mL IntraVENous PRN  Vancomycin Pharmacy Dosing   Other Rx Dosing/Monitoring  insulin lispro (HUMALOG) injection 5 Units  5 Units SubCUTAneous TIDAC  clopidogreL (PLAVIX) tablet 75 mg  75 mg Oral DAILY  sodium chloride (NS) flush 5-10 mL  5-10 mL IntraVENous PRN  
 
______________________________________________________________________ EXPECTED LENGTH OF STAY: - - - 
ACTUAL LENGTH OF STAY:          4 Michelle Dorman MD

## 2020-11-17 NOTE — PROGRESS NOTES
ID Progress Note 2020 Subjective:  
 
Emphasis on comfort noted--seems reasonable--will continue with antibiotic therapy for now Objective: Antibiotics: 1. Vancomycin 2. Cefepime Vitals:  
Visit Vitals BP (!) 102/52 (BP 1 Location: Left arm, BP Patient Position: At rest) Pulse 95 Temp 98 °F (36.7 °C) Resp 18 Ht 6' 2\" (1.88 m) Wt 90.5 kg (199 lb 8.3 oz) SpO2 93% BMI 25.62 kg/m² Tmax:  Temp (24hrs), Av.1 °F (36.7 °C), Min:97.3 °F (36.3 °C), Max:98.6 °F (37 °C) Exam:  Lungs:  clear to auscultation bilaterally Heart:  regular rate and rhythm Abdomen:  soft, non-tender. Bowel sounds normal. No masses,  no organomegaly Labs:     
Recent Labs 20 
7622 20 
0882 11/15/20 
0419 11/15/20 
0252 WBC 18.6* 19.8*  --  21.8* HGB 14.7 14.4  --  14.0  
 169  --  125* BUN 33* 40* 47*  --   
CREA 0.98 1.02 1.06  --   
 
 
Cultures: No results found for: JOSE FRANCISCO Lab Results Component Value Date/Time Culture result: No growth (<1,000 CFU/ML) 2020 08:06 PM  
 Culture result: NO GROWTH 5 DAYS 2020 02:28 PM  
 Culture result: NO GROWTH 4 DAYS 2020 04:22 AM  
 
 
Radiology:  
 
Line/Insert Date:        
 
Assessment: 1. Debility 2. Leukocytosis--improved 3. Tooth pain--improved Objective: 1. Continue current therapy for now--treat until  Phuong Hernandez MD

## 2020-11-17 NOTE — DIABETES MGMT
[address] [facility] PROGRAM FOR DIABETES HEALTH 
 
FOLLOW UP NOTE Presentation Dameon Zelaya is a 80 y.o. male who presented to the ED after being found down at his independent living facility. He was noted to have an elevated troponin, abnormal EKG, elevated lactate with MAN and admitted for medical management. HX: CVA with residual left-sided hemiparesis, Type 2 Diabetes, Dementia DX: NSTEMI, AMS, Sepsis, Rhabdomyolysis TX: Cardiology consult, ECHO/CT, blood cultures with IV abox, IV resucitation Current clinical course has been uncomplicated. Diabetes: Patient has known Type two diabetes, treated with metformin PTA. Family history unknown for diabetes. Consulted by Provider for advanced diabetes nursing assessment and care, specifically related to  
  
[x] Inpatient management strategy [x] Home management assessment Diabetes-related medical history Acute complications: Hyperglycemia Neurological complications Peripheral neuropathy Microvascular disease: None Macrovascular disease Cerebral vascular accident Other associated conditions: dementia and paranoia Diabetes medication history Drug class Currently in use Discontinued Never used Biguanide Metformin 1000 mg BID    
DDP-4 inhibitor Sulfonylurea Glipizide 5mg Daily Thiazolidinedione GLP-1 RA SGLT-2 inhibitors Basal insulin Fixed Dose  Combinations Bolus insulin Subjective Patient remains confused per notes. Palliative involved - will focus on comfort care /hospice when patient discharged. DNR status noted. Patient resides in independent living at [facility] His nephew is Giovanna Guerrero- his next of KIN/emergency contact. and resides in [place]. Wife has passed away and does not have children Nephew reports that he was moved to [place] in September He did have a caregiver in [place] who assisted with transportation to doctor visits/ADLs. She may have a list of his medications but otherwise, he will be trying to get in touch with his PCP in [place]. He couldn't remember who his PCP was but is looking through records at home. Per nephew, he had an abscessed tooth 2-3 weeks ago, was given a prescription for an antibiotic but nephew is unclear if he filled and took Rx. Patient unable to report home diabetes management practices. Metformin and Glipizide bottles at bedside: 
Last PCP was Susan Marquez MD 
[address] [place], [address] [facility] 
[phone] Current inpatient information ECHO-EF 15% Heparin gtt CT infection r/o, IV abox and blood cx MAN: GFR improving, now WNL A1C 8.2% Glucose 342 Leukocytosis (EBC 24.8) Small ketones in urine, 1000+ glucose in urine Initial anion gap 14, now 12 Lactate 3.6 Objective Physical exam 
General Confused, awake Vital Signs Visit Vitals /71 (BP 1 Location: Left arm, BP Patient Position: At rest) Pulse 92 Temp 97.3 °F (36.3 °C) Resp 16 Ht 6' 2\" (1.88 m) Wt 90.5 kg (199 lb 8.3 oz) SpO2 96% BMI 25.62 kg/m² Skin  Warm and dry. Acanthosis noted along neckline. Heart   Regular rate and rhythm. No murmurs, rubs or gallops Lungs  Clear to auscultation without rales or rhonchi Extremities No foot wounds Laboratory Lab Results Component Value Date/Time Hemoglobin A1c 8.2 (H) 11/13/2020 04:22 AM  
 
No results found for: LDL, LDLC, DLDLP Lab Results Component Value Date/Time Creatinine 0.98 11/17/2020 03:18 AM  
 
Lab Results Component Value Date/Time  Sodium 138 11/17/2020 03:18 AM  
 Potassium 3.9 11/17/2020 03:18 AM  
 Chloride 109 (H) 11/17/2020 03:18 AM  
 CO2 25 11/17/2020 03:18 AM  
 Anion gap 4 (L) 11/17/2020 03:18 AM  
 Glucose 88 11/17/2020 03:18 AM  
 BUN 33 (H) 11/17/2020 03:18 AM  
 Creatinine 0.98 11/17/2020 03:18 AM  
 BUN/Creatinine ratio 34 (H) 11/17/2020 03:18 AM  
 GFR est AA >60 11/17/2020 03:18 AM  
 GFR est non-AA >60 11/17/2020 03:18 AM  
 Calcium 9.3 11/17/2020 03:18 AM  
 Bilirubin, total 1.3 (H) 11/13/2020 09:00 AM  
 Alk. phosphatase 75 11/13/2020 09:00 AM  
 Protein, total 7.0 11/13/2020 09:00 AM  
 Albumin 3.1 (L) 11/13/2020 09:00 AM  
 Globulin 3.9 11/13/2020 09:00 AM  
 A-G Ratio 0.8 (L) 11/13/2020 09:00 AM  
 ALT (SGPT) 54 11/13/2020 09:00 AM  
 
Lab Results Component Value Date/Time ALT (SGPT) 54 11/13/2020 09:00 AM  
 
 
Factors affecting BG pattern Factor Dose Comments Nutrition: 
Carb-controlled meals 60 grams/meal PO intake 15% Lactate 3.6-->2.3 (11/14/2020) Infection Leukocytosis improving on IV antibiotics Other: MAN GFR WNL ,improvement with hydration Blood glucose pattern Assessment and Plan Nursing Diagnosis Risk for unstable blood glucose pattern Nursing Intervention Domain 5250 Decision-making Support Nursing Interventions Examined current inpatient diabetes control Explored factors facilitating and impeding inpatient management Identified self-management practices impeding diabetes control Explored corrective strategies with patient and responsible inpatient provider Informed patient of rational for insulin strategy while hospitalized Evaluation Kacy Layne \"Slava\" Janeth Benavidez is an 80year old gentleman with type two diabetes on metformin admitted after being found down in his assisted living facility and found to have hyperglycemia, rhabdomyelitis, MAN, abnormal EKG with sepsis with lactic acidosis. Cardiology consulted and IV antibiotics with fluid resuscitation initiated. At this time A1C 8.2%, likely goal for age and mental status. At this time, glucose elevated and reasonable to start low dose basal insulin. Inpatient glucose goal closer to 180. BG trends over the weekend responded nicely to basal/bolus and correctional insulin regimen. AMBG today 153mg. dl.  Given advanced age, tight glycemic control not recommended. Continue with basal insulin and pre-meal insulin regiman. Would consider reducing pre meal insulin to reduce risk of hypoglycemia. Recommendations Recommend 1. CONTINUE basal insulin 15units daily 2. Correctional insulin ACHS at normal sensitivity, start at a glucose of 200 
 
3. DECREASE  pre-meal insulin- 3 units TID Humalog before meals (HOLD for reduced PO intake <50%) Discharge Planning 1. CM working SNF vs. hospice placement 2. Since A1C 8.2% and advanced age, would recommend re-starting oral diabetic medications per PTA medication list.  OR if focus is on comfort care, discontinue Glipizide since PO intake erratic/marginal. 
Billing Code(s) I personally reviewed chart, notes, data and current medications in the medical record, and examined the patient at bedside before making care recommendations. Thank you for including us in their care. I spent 20 minutes in direct patient care today for this patient. Time includes chart review, face to face with patient and collaboration with interdisciplinary care team. 
  
 
[facility] Access via [address] [place] [address] [phone]

## 2020-11-17 NOTE — PROGRESS NOTES
Hospitalist Progress Note Mortimer Ober, MD 
Answering service: [phone] from in house phone Date of Service:  2020 NAME:  Cristy Jessica :  [id number] MRN:  [id number] Admission Summary: As per initial admission summary This is an 59-year-old man with past medical history significant for dementia, status post CVA, and type 2 diabetes, who was in his usual state of health until the day of his presentation at the emergency room when the patient was found on the floor at the assisted living facility where the patient resides. According to report, the patient was feeling hot all night long, he got up, felt weak and his legs gave out on him, the patient collapsed to the floor and unable to get up. His neighbor heard him screaming all night long. EMS was called. When the EMS arrived at the scene, the patient's EKG shows possible ST elevation myocardial infarction. This was called to the emergency room and the patient was brought to the emergency room as a code ST-elevation myocardial infarction. When the patient arrived at the emergency room, the patient's EKG was reviewed by the cardiologist and code ST-elevation myocardial infarction was canceled. The patient has significant lab abnormalities including significant leukocytosis and elevated troponin level. The patient remained confused in the emergency room, unable to provide good history. The patient was seen by the cardiologist in the emergency room. Conservative treatment was advised. The patient was also seen by the intensivist for evaluation for ICU admission. The patient is not a candidate for ICU admission according to the intensivist.  The patient was subsequently referred to the hospitalist service for evaluation for admission. The patient has no record of prior admission to this hospital. 
 
Interval history / Subjective: Patient seen for Follow up of NSTEMI Patient seen and examined by the bedside, Labs, images and notes reviewed Cardiology following. Unable to obtain any meaning ful history from patient. Patient with no complaints,  
Consulted palliative as well. Patient is sick. D/w emergency contact listed (Billie Marrero). He told me that he is not poa. Talked to in detail . He was very appreciative. Told him that patient is critically sick and may need to be transferred to ICU. He mentioned that may be a DNR? But patient wants resuscitative measures. CODE status not clear. palliative consulted 11/14: looks more alert today. Apparently having some hallucinations. Blood culture positive for gram positive cocci 1/2. Already on vancomycin. Worsening leucocytosis. ID consult pending 11/15 improving leukocytosis. Afebrile. Poor oral intake. If continues to have poor oral intake may need to put tube feeding. ID following. Overall high risk of deterioration . Heparin drip stopped. Started on lovenox. D/W emergencuy contact (stacy Quezadawith in detail. Updated him. He was very appreciative 11/16: looks more alert today but appears to be confused. Leucocytosis is getting better. No fever spikes. No acute events overnight. MRI today . PT/OT consulted 11/17: Palliative team consulted. As per palliative will consult hospice before discharge. For now now we will continue current management. patient appears to be confused. Still on abx. Cardiology signed off. ID following still on broad spectrum abx. CM working on placement. likely SNF vs LTC Talked to Preethi Manuel updated him regarding patient condition. Assessment & Plan: 1. Non-ST elevation myocardial infarction:   
on Plavix,  
heparin drip was stopped  as per cardiology Echocardiogram , · Estimated LVEF is <15%. Mildly dilated left ventricle. Decreased wall thickness.  Severely and segmentally reduced systolic function. Moderate (grade 2) left ventricular diastolic dysfunction. · Large apical thrombus measuring 8 by 12 mm in size. Associated with apical severe hypokinesis. likely ischemic cardiomyopathy Not a good candidate for any invasive therapies, recommended conservative management Cardiology following On lovenox #LV apical thrombus May need 6-8 weeks of anticoagulation as per cardiology Not a good candidate for long term anticoagulation Will appreciate cardiology recommendations before discharge regarding oral anticoagulation On lovenox 2. Sepsis:  improving  
 significant leukocytosis, elevated lactic acid level, and tachycardia. No clear source of infection at this time. CT chest , Small bilateral pleural effusions with mild bibasilar atelectasis. 
  CT scan of the abdomen and pelvis, negative for any acute changes  
 on vancomycin and cefepime. We will await blood culture results. Patient already on vancomycin Today some downtrend of leucocytosis As per CT maxifacial surgery: Right third maxillary molar demonstrates dental caries. There is a periapical lucency around the right third maxillary molar may represent a periapical 
abscess as well as inflammation in the adjacent gingiva. ID consulted , following, blood culture gram positive cocci 1/2 #lactic acidosis, improved Low bicarb resolved now Likely due to underlying infection #hyperkalemia, resolved BMP in AM 
 
3.   atrial fibrillation: It is not clear whether the patient has history of atrial fibrillation. Cardiology following Not a good candidate for long term anticoagulation . 4.  Acute kidney injury:  resolved This is most likely due to volume depletion. We will carry out fluid therapy and monitor the patient's renal function. BMP in AM 
If continues to get wore will involve nephrology 5. Acute rhabdomyolysis:  improved This is as a result of the patient being found on the floor of his apartment at the assisted living facility. We will carry out fluid therapy and monitor the patient closely. . Improving 6. Abnormal liver function tests: The patient may require Hepatology consult if the abnormality is persistent or getting worse. 7.  Type 2 diabetes with hyperglycemia:  
 The patient will be placed on sliding scale with insulin coverage. Added lantus 15 units . Need further adjustment. 8.  Syncope: MRI and MRA of the brain to evaluate the patient for stroke as a possible cause of syncope, especially in this patient who has had a stroke in the past.   
 
9.  Acute delirium:  waxing and waning This is most likely due to metabolic event. CT scan of the head is negative. Will monitor for now 10. Fall:  The patient was found on the floor at the assisted living facility. CT scan of the head is negative. CT scan of the cervical spine did not show any acute fracture. PT/OT consult 11. Dementia:  The patient most likely has underlying memory impairment. We will carry out supportive treatment. We will await the results of MRI of the brain to evaluate the patient for stroke. 12.  Hypercalcemia:   
IV fluids Code status: Full code DVT prophylaxis: on heparin drip Care Plan discussed with: Patient/Family Disposition: TBD Hospital Problems  Date Reviewed: 11/12/2020 Codes Class Noted POA * (Principal) NSTEM[facility] (non-ST elevated [facility]) [facility] ICD-10-CM: I21.4 ICD-9-CM: 410.70  11/12/2020 Yes Review of Systems: A comprehensive review of systems was negative except for that written in the HPI. Vital Signs:  
 Last 24hrs VS reviewed since prior progress note. Most recent are: 
Visit Vitals BP (!) 102/52 (BP 1 Location: Left arm, BP Patient Position: At rest) Pulse 91 Temp 98 °F (36.7 °C) Resp 18 Ht 6' 2\" (1.88 m) Wt 90.5 kg (199 lb 8.3 oz) SpO2 93% BMI 25.62 kg/m² Intake/Output Summary (Last 24 hours) at 11/17/2020 1638 Last data filed at 11/17/2020 1510 Gross per 24 hour Intake 1060 ml Output  Net 1060 ml Physical Examination:  
I had a face to face encounter with this patient and independently examined them on November 17, 2020 as outlined below: 
     
Constitutional:  ill appearing patient , appears to be confused ENT:  Oral mucous moist, oropharynx benign. Neck supple, Resp:  CTA bilaterally. No wheezing/rhonchi/rales. No accessory muscle use CV:  Regular rhythm, normal rate, no murmurs, gallops, rubs GI:  Soft, non distended, non tender. normoactive bowel sounds, no hepatosplenomegaly Musculoskeletal:  No edema, warm, 2+ pulses throughout Data Review:  
 Review and/or order of clinical lab test 
Review and/or order of tests in the radiology section of CPT Review and/or order of tests in the medicine section of CPT Labs:  
 
Recent Labs 11/17/20 
3172 11/16/20 
1829 WBC 18.6* 19.8* HGB 14.7 14.4 HCT 44.2 42.6  169 Recent Labs 11/17/20 
0832 11/16/20 
0233 11/15/20 
8529  143 136  
K 3.9 3.1* 5.3*  
* 112* 116* CO2 25 24 15* BUN 33* 40* 47* CREA 0.98 1.02 1.06  
GLU 88 119* 110* CA 9.3 9.0 8.8 No results for input(s): ALT, AP, TBIL, TBILI, TP, ALB, GLOB, GGT, AML, LPSE in the last 72 hours. No lab exists for component: SGOT, GPT, AMYP, HLPSE No results for input(s): INR, PTP, APTT, INREXT, INREXT in the last 72 hours. No results for input(s): FE, TIBC, PSAT, FERR in the last 72 hours. No results found for: FOL, RBCF No results for input(s): PH, PCO2, PO2 in the last 72 hours. No results for input(s): CPK, CKNDX, TROIQ in the last 72 hours. No lab exists for component: CPKMB No results found for: CHOL, CHOLX, CHLST, CHOLV, HDL, HDLP, LDL, LDLC, DLDLP, TGLX, TRIGL, TRIGP, CHHD, CHHDX Lab Results Component Value Date/Time Glucose (POC) 147 (H) 11/17/2020 11:35 AM  
 Glucose (POC) 153 (H) 11/17/2020 06:34 AM  
 Glucose (POC) 75 11/16/2020 09:29 PM  
 Glucose (POC) 127 (H) 11/16/2020 05:09 PM  
 Glucose (POC) 230 (H) 11/16/2020 11:46 AM  
 
Lab Results Component Value Date/Time Color YELLOW/STRAW 11/12/2020 08:06 PM  
 Appearance CLEAR 11/12/2020 08:06 PM  
 Specific gravity 1.025 11/12/2020 08:06 PM  
 pH (UA) 5.0 11/12/2020 08:06 PM  
 Protein 100 (A) 11/12/2020 08:06 PM  
 Glucose >1,000 (A) 11/12/2020 08:06 PM  
 Ketone 15 (A) 11/12/2020 08:06 PM  
 Bilirubin Negative 11/12/2020 08:06 PM  
 Urobilinogen 0.2 11/12/2020 08:06 PM  
 Nitrites Negative 11/12/2020 08:06 PM  
 Leukocyte Esterase Negative 11/12/2020 08:06 PM  
 Epithelial cells FEW 11/12/2020 08:06 PM  
 Bacteria Negative 11/12/2020 08:06 PM  
 WBC 0-4 11/12/2020 08:06 PM  
 RBC 10-20 11/12/2020 08:06 PM  
 
 
 
Medications Reviewed:  
 
Current Facility-Administered Medications Medication Dose Route Frequency  vancomycin (VANCOCIN) 1750 mg in  ml infusion  1,750 mg IntraVENous Q24H  
 metoprolol succinate (TOPROL-XL) XL tablet 25 mg  25 mg Oral DAILY  haloperidol lactate (HALDOL) injection 2 mg  2 mg IntraVENous Q8H PRN  
 enoxaparin (LOVENOX) injection 90 mg  90 mg SubCUTAneous Q12H  
 insulin glargine (LANTUS) injection 15 Units  15 Units SubCUTAneous DAILY  sodium chloride (NS) flush 5-40 mL  5-40 mL IntraVENous Q8H  
 sodium chloride (NS) flush 5-40 mL  5-40 mL IntraVENous PRN  
 acetaminophen (TYLENOL) tablet 650 mg  650 mg Oral Q6H PRN Or  
 acetaminophen (TYLENOL) suppository 650 mg  650 mg Rectal Q6H PRN  polyethylene glycol (MIRALAX) packet 17 g  17 g Oral DAILY PRN  promethazine (PHENERGAN) tablet 12.5 mg  12.5 mg Oral Q6H PRN Or  
 ondansetron (ZOFRAN) injection 4 mg  4 mg IntraVENous Q6H PRN  
 lactobac ac& pc-s.therm-b.anim (SENDY Q/RISAQUAD)  1 Cap Oral DAILY  cefepime (MAXIPIME) 2 g in 0.9% sodium chloride (MBP/ADV) 100 mL MBP  2 g IntraVENous Q12H  
 insulin lispro (HUMALOG) injection   SubCUTAneous AC&HS  
 glucose chewable tablet 16 g  4 Tab Oral PRN  
 glucagon (GLUCAGEN) injection 1 mg  1 mg IntraMUSCular PRN  
 dextrose 10 % infusion 125-250 mL  125-250 mL IntraVENous PRN  Vancomycin Pharmacy Dosing   Other Rx Dosing/Monitoring  insulin lispro (HUMALOG) injection 5 Units  5 Units SubCUTAneous TIDAC  clopidogreL (PLAVIX) tablet 75 mg  75 mg Oral DAILY  sodium chloride (NS) flush 5-10 mL  5-10 mL IntraVENous PRN  
 
______________________________________________________________________ EXPECTED LENGTH OF STAY: 4d 4h 
ACTUAL LENGTH OF STAY:          5 Cathie Singh MD

## 2020-11-17 NOTE — ACP (ADVANCE CARE PLANNING)
Adv Care Plan  Note When patient lived in [place], he signed a DDNR for himself on 7-. Patient currently lacks decision making capacity for health decisions due to delirium and underlying dementia. Patient's nephew, Josy Ocasio has been providing care coordination for the patient. Chris Farias lives in [place] and his father is patient's NOK, but is elderly, unable to travel/ participate in patient's healthcare issues. He has been deferring to his son Josy Ocasio to handle things. Goals clarified to focus on comfort. Will be consulting hospice to support patient at time of discharge.

## 2020-11-17 NOTE — PROGRESS NOTES
Problem: Self Care Deficits Care Plan (Adult) Goal: *Acute Goals and Plan of Care (Insert Text) 
11/17/2020 1031 by Vi Garvin Note:  
FUNCTIONAL STATUS PRIOR TO ADMISSION: Patient was modified independent using a rolling walker for functional mobility. Patient lived in an [address] [place] [address] Road. Nephew reports patient has been having difficulty remembering to pay his rent on time. HOME SUPPORT: The patient lived alone with family members nearby to provide assistance. Occupational Therapy Goals Initiated 11/17/2020 1. Patient will perform grooming seated unsupported EOB with supervision/set-up within 7 day(s). 2.  Patient will perform UB bathing with moderate assistance  within 7 day(s) and < 3 verbal cues. 3.  Patient will perform lower body dressing with maximal assistance within 7 day(s) with verbal cueing and prompting as needed. 4.  Patient will perform toilet transfers to bedside commode with moderate assistance  within 7 day(s) using AE PRN. 11/17/2020 1029 by Vi Garvin Outcome: Not Met OCCUPATIONAL THERAPY EVALUATION Patient: Bob Bowman (89 y.o. male) Date: 11/17/2020 Primary Diagnosis: NSTEMI (non-ST elevated myocardial infarction) ([facility] [I21.4] Precautions: Fall, DNR 
 
ASSESSMENT Based on the objective data described below, the patient presents with generalized weakness, decreased functional use of BUE, and impaired cognition, significantly limiting patient this session. Patient was extremely talkative with tangential speech, decreased attention, little to no command following, and disoriented x 4 this session. Patient also noted to have some left peripheral inattention (per nephew, patient with 2 prior strokes affecting functional mobility and other aspects of self-care). Patient required largely MIN A to maintain dynamic sitting balance EOB for grooming and self-feeding tasks with max verbal cues provided.  Patient required MAX A for bed mobility and unable to attempt standing at this time due to cognition, insight, and safety. Patient's nephew present during OT evaluation and noted per chart review, is trying to secure guardianship for patient. Recommend discharge to [facility] due to progression of patient's dementia/impaired cognition and difficulty performing daily ADL and IADL tasks, including dressing, bathing, toileting, and financial management at this time. Current Level of Function Impacting Discharge (ADLs/self-care): MAX A LB dressing; MAX A bathing; MIN A grooming with heavy verbal cues Functional Outcome Measure: The patient scored 15/100 on the Barthel Index outcome measure which is indicative of 85% ADL impairment. Other factors to consider for discharge: safety; cognition; insight into deficits Patient will benefit from skilled therapy intervention to address the above noted impairments. PLAN : 
Recommendations and Planned Interventions: functional mobility training, therapeutic exercise, balance training, visual/perceptual training, therapeutic activities, endurance activities, patient education, home safety training, and family training/education Frequency/Duration: Patient will be followed by occupational therapy 3 times a week to address goals. Recommendation for discharge: (in order for the patient to meet his/her long term goals) [facility] or discharge back to ILF with physical assistance for all ADL and IADL tasks including bathing, dressing, toileting, cooking, cleaning, laundry, financial management, and supervision for safety during hours patient is awake This discharge recommendation: 
Has not yet been discussed the attending provider and/or case management IF patient discharges home will need the following DME: patient owns DME required for discharge SUBJECTIVE:  
Patient stated I was in [facility] this morning, down in [place].  OBJECTIVE DATA SUMMARY:  
 HISTORY:  
No past medical history on file. No past surgical history on file. Expanded or extensive additional review of patient history:  
 
Home Situation Home Environment: Independent living One/Two Story Residence: One story Living Alone: Yes Support Systems: Family member(s) Patient Expects to be Discharged to[de-identified] Independent living facility Current DME Used/Available at Home: Raised toilet seat, Shower chair, Walker, rolling, Wheelchair EXAMINATION OF PERFORMANCE DEFICITS: 
Cognitive/Behavioral Status: 
Neurologic State: Confused Orientation Level: Disoriented X4(however mentioned [facility] in conversation) Cognition: Decreased attention/concentration;Decreased command following;Poor safety awareness;Memory loss Perception: Cues to attend left visual field Perseveration: Perseverates during conversation;Perseverates during ADLS;Verbal cues provided;Visual cues provided; Tactile cues provided Safety/Judgement: Decreased awareness of environment;Decreased awareness of need for assistance;Decreased awareness of need for safety; Lack of insight into deficits Skin: exposed areas grossly intact Edema: none noted Hearing: Auditory Auditory Impairment: Hard of hearing, bilateral 
 
Vision/Perceptual:   
Tracking: (unable to test due to decreased attention/command following) Range of Motion: BUE 
AROM: Generally decreased, functional(patient self-feeding with left-hand) PROM: Generally decreased, functional(BUE shoulder ROM to 90 degrees ) Strength: BUE Strength: Generally decreased, functional(patient squeezed OT's hand with bilateral hands >5 verbal cu) Coordination: 
Coordination: Generally decreased, functional 
Fine Motor Skills-Upper: Left Intact; Right Intact(with heavy verbal cueing and prompting; decreased) Gross Motor Skills-Upper: Left Intact; Right Intact Tone & Sensation: BUE Tone: Abnormal 
 Sensation: (unable to assess) Balance: 
Sitting: Impaired; Without support Sitting - Static: Good (unsupported) Sitting - Dynamic: Fair (occasional) Standing: (unable to attempt due to poor command following) Functional Mobility and Transfers for ADLs: 
Bed Mobility: 
Rolling: Maximum assistance Supine to Sit: Moderate assistance Sit to Supine: Maximum assistance Scooting: Maximum assistance Transfers: 
Sit to Stand: (unable to complete this session) ADL Assessment: 
Feeding: Supervision;Setup(heavy cues) Oral Facial Hygiene/Grooming: Setup;Supervision(heavy verbal cues) Bathing: Maximum assistance(infer 2* command following and generalized weakness) Upper Body Dressing: Maximum assistance(infer 2* cognition and limited B shoulder ROM) Lower Body Dressing: Total assistance(2* strength, cognition, and balance) Toileting: Total assistance(patient wearing briefs and unaware of incontinence ) ADL Intervention and task modifications: 
Feeding Feeding Assistance: Supervision;Set-up Utensil Management: Set-up; Supervision Food to Mouth: Supervision;Set-up Drink to Mouth: Set-up Cues: Verbal cues provided Grooming Brushing/Combing Hair: Minimum assistance(seated EOB) Lower Body Dressing Assistance Socks: Total assistance (dependent)(simulated 2* cognition) Toileting Bladder Hygiene: Total assistance (dependent)(wearing briefs at this time) Cognitive Retraining Safety/Judgement: Decreased awareness of environment;Decreased awareness of need for assistance;Decreased awareness of need for safety; Lack of insight into deficits Functional Measure: 
Barthel Index: 
 
Bathin Bladder: 0 Bowels: 5 Groomin Dressin Feedin Mobility: 0 Stairs: 0 Toilet Use: 0 Transfer (Bed to Chair and Back): 0 Total: 15/100 The Barthel ADL Index: Guidelines 1. The index should be used as a record of what a patient does, not as a record of what a patient could do. 2. The main aim is to establish degree of independence from any help, physical or verbal, however minor and for whatever reason. 3. The need for supervision renders the patient not independent. 4. A patient's performance should be established using the best available evidence. Asking the patient, friends/relatives and nurses are the usual sources, but direct observation and common sense are also important. However direct testing is not needed. 5. Usually the patient's performance over the preceding 24-48 hours is important, but occasionally longer periods will be relevant. 6. Middle categories imply that the patient supplies over 50 per cent of the effort. 7. Use of aids to be independent is allowed. Alexander March., Barthel, D.W. (0533). Functional evaluation: the Barthel Index. [address] [facility] (14)2. [place] [place] LEIGH Pickard, Melania Rodriguez., [place]., [place], [address] [place] [address] (1999). Measuring the change indisability after inpatient rehabilitation; comparison of the responsiveness of the Barthel Index and Functional [place] Measure. Journal of Neurology, Neurosurgery, and Psychiatry, 66(4), 428-907. Elliott Barreto, N.[place].A, SOO Garcia, & José Miguel Lucero MCAROL. (2004.) Assessment of post-stroke quality of life in cost-effectiveness studies: The usefulness of the Barthel Index and the EuroQoL-5D. [facility], 51, 243-63 Occupational Therapy Evaluation Charge Determination History Examination Decision-Making LOW Complexity : Brief history review  MEDIUM Complexity : 3-5 performance deficits relating to physical, cognitive , or psychosocial skils that result in activity limitations and / or participation restrictions MEDIUM Complexity : Patient may present with comorbidities that affect occupational performnce.  Miniml to moderate modification of tasks or assistance (eg, physical or verbal ) with assesment(s) is necessary to enable patient to complete evaluation Based on the above components, the patient evaluation is determined to be of the following complexity level: LOW Activity Tolerance:  
Fair, requires frequent rest breaks, and requires constant redirection to task; HR 80s-100 bpm 
 
After treatment patient left in no apparent distress:   
Supine in bed, Call bell within reach, and Side rails x 3 
 
COMMUNICATION/EDUCATION:  
The patients plan of care was discussed with: Physical therapist and Registered nurse. Home safety education was provided and the patient/caregiver indicated understanding., Patient/family have participated as able in goal setting and plan of care. , and Patient/family agree to work toward stated goals and plan of care. This patients plan of care is appropriate for delegation to [facility]. Thank you for this referral. 
Chetna Valdivia Time Calculation: 39 mins

## 2020-11-17 NOTE — PROGRESS NOTES
0730: Bedside and Verbal shift change report given to Rashmi Griffin RN (oncoming nurse) by Lizet Aldana RN (offgoing nurse). Report included the following information SBAR, Kardex, Intake/Output, MAR, Recent Results and Cardiac Rhythm A-fib; BBB.  
 
1930: Bedside and Verbal shift change report given to Lizet Aldana RN (oncoming nurse) by Rashmi Griffin RN (offgoing nurse).  Report included the following information SBAR, Kardex, Intake/Output, MAR, Recent Results and Cardiac Rhythm A-fib; BBB.

## 2020-11-17 NOTE — CONSULTS
Palliative Medicine Consult Demarcus: [phone] ([phone]) Patient Name: Missy De Paz YOB: 1931 Date of Initial Consult: 2020 Reason for Consult: care decisions Requesting Provider: Dr. Shirley Flores Primary Care Physician: UNKNOWN 
 
 SUMMARY:  
Missy De Paz is a 80 y.o. with a past history of early dementia, hx CVA X2  w left side hemiparesis, DM, who was admitted on 2020 from [facility] with a diagnosis of found down/ AMS. Current medical issues leading to Palliative Medicine involvement include:  
Metabolic encephalopathy/ delirium/ underlying dementia, NSTEMI, ECHO w EF 15%, large apical thrombus/ severe apical hypokinesis Acute rhabdomyolysis   MRI Brain: multiple areas of chronic infarction with no acute process Patient is a [employer] [place]. Retired from [employer]. His wife of 32 years  in  of breast cancer. They do not have any children. He was living in [place] (unsafe living conditions/ not able to care for self), when his nephew Nathalia Turner convinced him to move to [place]. (guardianship proceedings in [place] still pending/ delayed w pandemic) Patient is originally from [place], and his wife is buried here. Mirtha Lawrence lives in [place]. (his father is patient's brother, he is in 80s/ frail health/ has been deferring decision making to son [address] [facility]) Vanessa Staggers PALLIATIVE DIAGNOSES:  
1. NSTEMI, coronary artery disease, ischemic cardiomyopathy EF 15%, apical thrombus/ hypokinesis 2. Shortness of breath 3. Delirium with underlying vascular dementia. Long history of paranoid ideations. Exacerbated now with acute issues. Patient does not have capacity at this time for complex medical decisions or discharge planning, use NOK, nephew Onjm Turner. 4. Debility 5. Sepsis, no source identified, improved on abx 6. Late effect of stroke, L hemiparesis 7. Hearing impaired 8. Anorexia due to end stage CHF, poor PO intake PLAN:  
1. Nephew working with case management on placement. 2. Met with nephew Jus Patricio 11- 1. Introduced palliative medicine services 2. Reviewed medical update.  (EF 15%, NSTEMI, confusion/ paranoia (not new), underlying dementia, infection) 3. Cassandra Loupe of past events/ move to [place] (patient wants to be buried next to his wife who is buried in [place]) 4. Patient has been clear about wishes in past for DNR (patient DDNR signed in [place] in 7-2020) -- will scan into records here. Also Fady signed [address] for patient today. 5. We talked about swallowing issues/ why he's been on thickened liquids/ does not like it/ continues to ask for water/ coffee. Agree that patient would want comfort diet/ be allowed to have what he wants. 6. Discuss option of trying rehab vs. Focus on comfort. August Malikann knows patient would not want attempts at rehab, he's bee clear about wishes in past (wanting to go when it's his time/ frustrated w nephew for rescuing him/ getting him medical care). 7. Recommend focus on comfort/ needs safe place for that kind of care (LTC or FPC) and referral to a hospice -- Fady in agreement with that plan. 8. Discussed w case management 9. Will continue to follow In summary: No escalation of care, overall goals comfort Continue easy measures that patient tolerates, such as medications, antibiotics Comfort diet, allow patient autonomy in food choices. DDNR Needs safe place to live (not able to safely care for self in Independent living) Consult hospice for time of discharge (does not meet criteria at this time for inpatient hospice) 3. Initial consult note routed to primary continuity provider and/or primary health care team members 4.  Communicated plan of care with: Palliative [facility] VALDE[facility] 192 Team 
 
 GOALS OF CARE / TREATMENT PREFERENCES:  
 
GOALS OF CARE: 
 Patient/Health Care Proxy Stated Goals: Other (comment) TREATMENT PREFERENCES:  
Code Status: DNR Advance Care Planning: 
[] [facility] Interdisciplinary Team has updated the ACP Navigator with Salomon Heard and Patient Capacity Advance Care Planning 11/15/2020 Confirm Advance Directive None Medical Interventions: Other (comment) Other Instructions: Other: As far as possible, the palliative care team has discussed with patient / health care proxy about goals of care / treatment preferences for patient. HISTORY:  
 
History obtained from: chart, nephew CHIEF COMPLAINT:  
Found down at home HPI/SUBJECTIVE: The patient is:  
[x] Verbal and participatory [] Non-participatory due to:  
 
80year old male found down at home/ apartment Unsure how long he was down. Previously lived in [place] (nephew reports this was not safe/ patient unable to care for self/ home unclean/cluttered) until recent move to [place] Patient's wife is buried in [place] so patient wanted to return to [place] Known to have poor short term memory Trouble with paranoia at times /afraid people are stealing from him/ poisoning him Two prior strokes with left side weakness Clinical Pain Assessment (nonverbal scale for severity on nonverbal patients):  
Clinical Pain Assessment Severity: 0 Activity (Movement): Lying quietly, normal position Duration: for how long has pt been experiencing pain (e.g., 2 days, 1 month, years) Frequency: how often pain is an issue (e.g., several times per day, once every few days, constant) FUNCTIONAL ASSESSMENT:  
 
Palliative Performance Scale (PPS): PPS: 30 
 
 
 PSYCHOSOCIAL/SPIRITUAL SCREENING:  
 
Palliative IDT has assessed this patient for cultural preferences / practices and a referral made as appropriate to needs (Cultural Services, Patient Advocacy, Ethics, etc.) Any spiritual / Confucianism concerns: 
[] Yes /  [x] No 
 
 Caregiver Burnout: 
[] Yes /  [x] No /  [] No Caregiver Present Anticipatory grief assessment:  
[x] Normal  / [] Maladaptive ESAS Anxiety: ESAS Depression:    
 
 
 REVIEW OF SYSTEMS:  
 
Positive and pertinent negative findings in ROS are noted above in HPI. The following systems were [x] reviewed / [] unable to be reviewed as noted in HPI Other findings are noted below. Systems: constitutional, ears/nose/mouth/throat, respiratory, gastrointestinal, genitourinary, musculoskeletal, integumentary, neurologic, psychiatric, endocrine. Positive findings noted below. Modified ESAS Completed by: provider Pain: 0 Dyspnea: 0 PHYSICAL EXAM:  
 
From RN flowsheet: 
Wt Readings from Last 3 Encounters:  
11/17/20 199 lb 8.3 oz (90.5 kg)  
11/13/20 207 lb 14.3 oz (94.3 kg) Blood pressure 109/71, pulse 92, temperature 97.3 °F (36.3 °C), resp. rate 16, height 6' 2\" (1.88 m), weight 199 lb 8.3 oz (90.5 kg), SpO2 96 %. Pain Scale 1: Numeric (0 - 10) Pain Intensity 1: 0 Last bowel movement, if known:  
 
Constitutional: sleepy, arousable NAD Eyes: pupils equal, anicteric ENMT: no nasal discharge, moist mucous membranes Cardiovascular: irregular rhythm, distal pulses intact Respiratory: increased RR breathing not labored, symmetric Gastrointestinal: soft non-tender, +bowel sounds Musculoskeletal: no deformity, no tenderness to palpation Skin: warm, dry ,tattoo left arm Neurologic: following commands, moving all extremities Psychiatric: normal affect, irritable at times Looking for a mask that he thinks is on bedside table (it is not, I have looked) and easily frustrated with my answers. Reassurance provided. Tells me he was in [place] when he woke up this morning, reorientation provided HISTORY:  
 
Principal Problem: 
  NSTEMI (non-ST elevated myocardial infarction) ([facility] [address] (11/12/2020) No past medical history on file. No past surgical history on file. No family history on file. History reviewed, no pertinent family history. Social History Tobacco Use  Smoking status: Not on file Substance Use Topics  Alcohol use: Not on file No Known Allergies Current Facility-Administered Medications Medication Dose Route Frequency  vancomycin (VANCOCIN) 1750 mg in  ml infusion  1,750 mg IntraVENous Q24H  
 metoprolol succinate (TOPROL-XL) XL tablet 25 mg  25 mg Oral DAILY  haloperidol lactate (HALDOL) injection 2 mg  2 mg IntraVENous Q8H PRN  
 enoxaparin (LOVENOX) injection 90 mg  90 mg SubCUTAneous Q12H  
 insulin glargine (LANTUS) injection 15 Units  15 Units SubCUTAneous DAILY  sodium chloride (NS) flush 5-40 mL  5-40 mL IntraVENous Q8H  
 sodium chloride (NS) flush 5-40 mL  5-40 mL IntraVENous PRN  
 acetaminophen (TYLENOL) tablet 650 mg  650 mg Oral Q6H PRN Or  
 acetaminophen (TYLENOL) suppository 650 mg  650 mg Rectal Q6H PRN  polyethylene glycol (MIRALAX) packet 17 g  17 g Oral DAILY PRN  promethazine (PHENERGAN) tablet 12.5 mg  12.5 mg Oral Q6H PRN Or  
 ondansetron (ZOFRAN) injection 4 mg  4 mg IntraVENous Q6H PRN  
 lactobac ac& pc-s.therm-b.anim (SENDY Q/RISAQUAD)  1 Cap Oral DAILY  cefepime (MAXIPIME) 2 g in 0.9% sodium chloride (MBP/ADV) 100 mL MBP  2 g IntraVENous Q12H  
 insulin lispro (HUMALOG) injection   SubCUTAneous AC&HS  
 glucose chewable tablet 16 g  4 Tab Oral PRN  
 glucagon (GLUCAGEN) injection 1 mg  1 mg IntraMUSCular PRN  
 dextrose 10 % infusion 125-250 mL  125-250 mL IntraVENous PRN  Vancomycin Pharmacy Dosing   Other Rx Dosing/Monitoring  insulin lispro (HUMALOG) injection 5 Units  5 Units SubCUTAneous TIDAC  clopidogreL (PLAVIX) tablet 75 mg  75 mg Oral DAILY  sodium chloride (NS) flush 5-10 mL  5-10 mL IntraVENous PRN  
 
 
 
 LAB AND IMAGING FINDINGS:  
 
Lab Results Component Value Date/Time  WBC 18.6 (H) 11/17/2020 03:18 AM  
 HGB 14.7 11/17/2020 03:18 AM  
 PLATELET 918 05/34/6522 03:18 AM  
 
Lab Results Component Value Date/Time Sodium 138 11/17/2020 03:18 AM  
 Potassium 3.9 11/17/2020 03:18 AM  
 Chloride 109 (H) 11/17/2020 03:18 AM  
 CO2 25 11/17/2020 03:18 AM  
 BUN 33 (H) 11/17/2020 03:18 AM  
 Creatinine 0.98 11/17/2020 03:18 AM  
 Calcium 9.3 11/17/2020 03:18 AM  
 Magnesium 2.1 11/13/2020 04:22 AM  
 Phosphorus 2.9 11/13/2020 09:00 AM  
  
Lab Results Component Value Date/Time Alk. phosphatase 75 11/13/2020 09:00 AM  
 Protein, total 7.0 11/13/2020 09:00 AM  
 Albumin 3.1 (L) 11/13/2020 09:00 AM  
 Globulin 3.9 11/13/2020 09:00 AM  
 
Lab Results Component Value Date/Time  
 aPTT 62.6 (H) 11/14/2020 08:50 AM  
  
No results found for: IRON, FE, TIBC, IBCT, PSAT, FERR No results found for: PH, PCO2, PO2 No components found for: [place] Lab Results Component Value Date/Time  (H) 11/14/2020 02:25 AM  
 CK - .6 (H) 11/12/2020 02:04 PM  
  
 
 
   
 
Total time:  
Counseling / coordination time, spent as noted above:  
> 50% counseling / coordination?:  
 
Prolonged service was provided for  []30 min   []75 min in face to face time in the presence of the patient, spent as noted above. Time Start:  
Time End:  
Note: this can only be billed with 57498 (initial) or 36372 (follow up). If multiple start / stop times, list each separately.

## 2020-11-17 NOTE — PROGRESS NOTES
Problem: Diabetes Self-Management Goal: *Incorporating nutritional management into lifestyle Description: Describe effect of type, amount and timing of food on blood glucose; list 3 methods for planning meals. Outcome: Progressing Towards Goal 
Goal: *Developing strategies to promote health/change behavior Description: Define the ABC's of diabetes; identify appropriate screenings, schedule and personal plan for screenings. Outcome: Progressing Towards Goal 
Goal: *Using medications safely Description: State effect of diabetes medications on diabetes; name diabetes medication taking, action and side effects. Outcome: Progressing Towards Goal 
Goal: *Monitoring blood glucose, interpreting and using results Description: Identify recommended blood glucose targets  and personal targets. Outcome: Progressing Towards Goal 
Goal: *Prevention, detection, treatment of acute complications Description: List symptoms of hyper- and hypoglycemia; describe how to treat low blood sugar and actions for lowering  high blood glucose level. Outcome: Progressing Towards Goal 
Goal: *Prevention, detection and treatment of chronic complications Description: Define the natural course of diabetes and describe the relationship of blood glucose levels to long term complications of diabetes. Outcome: Progressing Towards Goal 
Goal: *Developing strategies to address psychosocial issues Description: Describe feelings about living with diabetes; identify support needed and support network Outcome: Progressing Towards Goal 
  
Problem: Pressure Injury - Risk of 
Goal: *Prevention of pressure injury Description: Document Eric Scale and appropriate interventions in the flowsheet.  
Outcome: Progressing Towards Goal 
Note: Pressure Injury Interventions: 
Sensory Interventions: Assess changes in LOC, Avoid rigorous massage over bony prominences, Check visual cues for pain, Discuss PT/OT consult with provider, Keep linens dry and wrinkle-free Moisture Interventions: Absorbent underpads, Apply protective barrier, creams and emollients, Check for incontinence Q2 hours and as needed Activity Interventions: Pressure redistribution bed/mattress(bed type), PT/OT evaluation Mobility Interventions: Pressure redistribution bed/mattress (bed type), PT/OT evaluation Nutrition Interventions: Document food/fluid/supplement intake Friction and Shear Interventions: Minimize layers, Apply protective barrier, creams and emollients Problem: Patient Education: Go to Patient Education Activity Goal: Patient/Family Education Outcome: Progressing Towards Goal 
  
Problem: Falls - Risk of 
Goal: *Absence of Falls Description: Document Richard Merlin Fall Risk and appropriate interventions in the flowsheet. Outcome: Progressing Towards Goal 
Note: Fall Risk Interventions: 
Mobility Interventions: Communicate number of staff needed for ambulation/transfer, Strengthening exercises (ROM-active/passive) Mentation Interventions: Door open when patient unattended, Evaluate medications/consider consulting pharmacy, Bed/chair exit alarm, Adequate sleep, hydration, pain control, Reorient patient, More frequent rounding, Room close to nurse's station Medication Interventions: Evaluate medications/consider consulting pharmacy, Bed/chair exit alarm Elimination Interventions: Call light in reach, Urinal in reach, Patient to call for help with toileting needs History of Falls Interventions: Consult care management for discharge planning, Door open when patient unattended, Room close to nurse's station, Bed/chair exit alarm Problem: Patient Education: Go to Patient Education Activity Goal: Patient/Family Education Outcome: Progressing Towards Goal 
  
Problem: Discharge Planning Goal: *Discharge to safe environment Outcome: Progressing Towards Goal 
  
 Problem: Patient Education: Go to Patient Education Activity Goal: Patient/Family Education Outcome: Progressing Towards Goal 
  
Problem: Patient Education: Go to Patient Education Activity Goal: Patient/Family Education Outcome: Progressing Towards Goal

## 2020-11-17 NOTE — PROGRESS NOTES
Hospitalist Progress Note Christine Ortega MD 
Answering service: [phone] OR 36 from in house phone Date of Service:  2020 NAME:  Nellie Freeman :  1931 MRN:  [id number] Admission Summary: As per initial admission summary This is an 79-year-old man with past medical history significant for dementia, status post CVA, and type 2 diabetes, who was in his usual state of health until the day of his presentation at the emergency room when the patient was found on the floor at the assisted living facility where the patient resides. According to report, the patient was feeling hot all night long, he got up, felt weak and his legs gave out on him, the patient collapsed to the floor and unable to get up. His neighbor heard him screaming all night long. EMS was called. When the EMS arrived at the scene, the patient's EKG shows possible ST elevation myocardial infarction. This was called to the emergency room and the patient was brought to the emergency room as a code ST-elevation myocardial infarction. When the patient arrived at the emergency room, the patient's EKG was reviewed by the cardiologist and code ST-elevation myocardial infarction was canceled. The patient has significant lab abnormalities including significant leukocytosis and elevated troponin level. The patient remained confused in the emergency room, unable to provide good history. The patient was seen by the cardiologist in the emergency room. Conservative treatment was advised. The patient was also seen by the intensivist for evaluation for ICU admission. The patient is not a candidate for ICU admission according to the intensivist.  The patient was subsequently referred to the hospitalist service for evaluation for admission. The patient has no record of prior admission to this hospital. 
 
Interval history / Subjective: Patient seen for Follow up of NSTEMI Patient seen and examined by the bedside, Labs, images and notes reviewed Cardiology following. Unable to obtain any meaning ful history from patient. Patient with no complaints,  
Consulted palliative as well. Patient is sick. D/w emergency contact listed (Vasquez Kelly). He told me that he is not poa. Talked to in detail . He was very appreciative. Told him that patient is critically sick and may need to be transferred to ICU. He mentioned that may be a DNR? But patient wants resuscitative measures. CODE status not clear. palliative consulted 11/14: looks more alert today. Apparently having some hallucinations. Blood culture positive for gram positive cocci 1/2. Already on vancomycin. Worsening leucocytosis. ID consult pending 11/15 improving leukocytosis. Afebrile. Poor oral intake. If continues to have poor oral intake may need to put tube feeding. ID following. Overall high risk of deterioration . Heparin drip stopped. Started on lovenox. D/W emergencuy contact (stacy Cortéss in detail. Updated him. He was very appreciative 11/16: looks more alert today but appears to be confused. Leucocytosis is getting better. No fever spikes. No acute events overnight. MRI today . PT/OT consulted 11/17: Palliative team consulted. As per palliative will consult hospice before discharge. For now now we will continue current management. patient appears to be confused. Still on abx. Cardiology signed off. ID following still on broad spectrum abx. CM working on placement. likely SNF vs LTC Assessment & Plan: 1. Non-ST elevation myocardial infarction:   
on Plavix,  
heparin drip was stopped  as per cardiology Echocardiogram , · Estimated LVEF is <15%. Mildly dilated left ventricle. Decreased wall thickness. Severely and segmentally reduced systolic function. Moderate (grade 2) left ventricular diastolic dysfunction.  · Large apical thrombus measuring 8 by 12 mm in size. Associated with apical severe hypokinesis. likely ischemic cardiomyopathy Not a good candidate for any invasive therapies, recommended conservative management Cardiology following On lovenox #LV apical thrombus May need 6-8 weeks of anticoagulation as per cardiology Not a good candidate for long term anticoagulation Will appreciate cardiology recommendations before discharge regarding oral anticoagulation On lovenox 2. Sepsis:  improving  
 significant leukocytosis, elevated lactic acid level, and tachycardia. No clear source of infection at this time. CT chest , Small bilateral pleural effusions with mild bibasilar atelectasis. 
  CT scan of the abdomen and pelvis, negative for any acute changes  
 on vancomycin and cefepime. We will await blood culture results. Patient already on vancomycin Today some downtrend of leucocytosis As per CT maxifacial surgery: Right third maxillary molar demonstrates dental caries. There is a periapical lucency around the right third maxillary molar may represent a periapical 
abscess as well as inflammation in the adjacent gingiva. ID consulted , following, blood culture gram positive cocci 1/2 #lactic acidosis, improved Low bicarb resolved now Likely due to underlying infection #hyperkalemia, resolved BMP in AM 
 
3.   atrial fibrillation: It is not clear whether the patient has history of atrial fibrillation. Cardiology following Not a good candidate for long term anticoagulation . 4.  Acute kidney injury:  resolved This is most likely due to volume depletion. We will carry out fluid therapy and monitor the patient's renal function. BMP in AM 
If continues to get wore will involve nephrology 5. Acute rhabdomyolysis:  improved This is as a result of the patient being found on the floor of his apartment at the assisted living facility.   We will carry out fluid therapy and monitor the patient closely. . Improving 6. Abnormal liver function tests: The patient may require Hepatology consult if the abnormality is persistent or getting worse. 7.  Type 2 diabetes with hyperglycemia:  
 The patient will be placed on sliding scale with insulin coverage. Added lantus 15 units . Need further adjustment. 8.  Syncope: MRI and MRA of the brain to evaluate the patient for stroke as a possible cause of syncope, especially in this patient who has had a stroke in the past.   
 
9.  Acute delirium:  waxing and waning This is most likely due to metabolic event. CT scan of the head is negative. Will monitor for now 10. Fall:  The patient was found on the floor at the assisted living facility. CT scan of the head is negative. CT scan of the cervical spine did not show any acute fracture. PT/OT consult 11. Dementia:  The patient most likely has underlying memory impairment. We will carry out supportive treatment. We will await the results of MRI of the brain to evaluate the patient for stroke. 12.  Hypercalcemia:   
IV fluids Code status: Full code DVT prophylaxis: on heparin drip Care Plan discussed with: Patient/Family Disposition: TBD Hospital Problems  Date Reviewed: 11/12/2020 Codes Class Noted POA * (Principal) NSTEMI (non-ST elevated myocardial infarction) ([facility] [address] ICD-10-CM: I21.4 ICD-9-CM: 410.70  11/12/2020 Yes Review of Systems: A comprehensive review of systems was negative except for that written in the HPI. Vital Signs:  
 Last 24hrs VS reviewed since prior progress note. Most recent are: 
Visit Vitals /71 (BP 1 Location: Left arm, BP Patient Position: At rest) Pulse 92 Temp 97.3 °F (36.3 °C) Resp 16 Ht 6' 2\" (1.88 m) Wt 90.5 kg (199 lb 8.3 oz) SpO2 96% BMI 25.62 kg/m² Intake/Output Summary (Last 24 hours) at 11/17/2020 1020 Last data filed at 11/16/2020 2338 Gross per 24 hour Intake 580 ml Output  Net 580 ml Physical Examination:  
I had a face to face encounter with this patient and independently examined them on November 17, 2020 as outlined below: 
     
Constitutional:  ill appearing patient , appears to be confused ENT:  Oral mucous moist, oropharynx benign. Neck supple, Resp:  CTA bilaterally. No wheezing/rhonchi/rales. No accessory muscle use CV:  Regular rhythm, normal rate, no murmurs, gallops, rubs GI:  Soft, non distended, non tender. normoactive bowel sounds, no hepatosplenomegaly Musculoskeletal:  No edema, warm, 2+ pulses throughout Data Review:  
 Review and/or order of clinical lab test 
Review and/or order of tests in the radiology section of CPT Review and/or order of tests in the medicine section of CPT Labs:  
 
Recent Labs 11/17/20 
8287 11/16/20 
2515 WBC 18.6* 19.8* HGB 14.7 14.4 HCT 44.2 42.6  169 Recent Labs 11/17/20 
2643 11/16/20 
0233 11/15/20 
0618  143 136  
K 3.9 3.1* 5.3*  
* 112* 116* CO2 25 24 15* BUN 33* 40* 47* CREA 0.98 1.02 1.06  
GLU 88 119* 110* CA 9.3 9.0 8.8 No results for input(s): ALT, AP, TBIL, TBILI, TP, ALB, GLOB, GGT, AML, LPSE in the last 72 hours. No lab exists for component: SGOT, GPT, AMYP, HLPSE No results for input(s): INR, PTP, APTT, INREXT, INREXT in the last 72 hours. No results for input(s): FE, TIBC, PSAT, FERR in the last 72 hours. No results found for: FOL, RBCF No results for input(s): PH, PCO2, PO2 in the last 72 hours. No results for input(s): CPK, CKNDX, TROIQ in the last 72 hours. No lab exists for component: CPKMB No results found for: CHOL, CHOLX, CHLST, CHOLV, HDL, HDLP, LDL, LDLC, DLDLP, TGLX, TRIGL, TRIGP, CHHD, CHHDX Lab Results Component Value Date/Time  Glucose (POC) 153 (H) 11/17/2020 06:34 AM  
 Glucose (POC) 75 11/16/2020 09:29 PM  
 Glucose (POC) 127 (H) 11/16/2020 05:09 PM  
 Glucose (POC) 230 (H) 11/16/2020 11:46 AM  
 Glucose (POC) 119 (H) 11/16/2020 06:56 AM  
 
Lab Results Component Value Date/Time Color YELLOW/STRAW 11/12/2020 08:06 PM  
 Appearance CLEAR 11/12/2020 08:06 PM  
 Specific gravity 1.025 11/12/2020 08:06 PM  
 pH (UA) 5.0 11/12/2020 08:06 PM  
 Protein 100 (A) 11/12/2020 08:06 PM  
 Glucose >1,000 (A) 11/12/2020 08:06 PM  
 Ketone 15 (A) 11/12/2020 08:06 PM  
 Bilirubin Negative 11/12/2020 08:06 PM  
 Urobilinogen 0.2 11/12/2020 08:06 PM  
 Nitrites Negative 11/12/2020 08:06 PM  
 Leukocyte Esterase Negative 11/12/2020 08:06 PM  
 Epithelial cells FEW 11/12/2020 08:06 PM  
 Bacteria Negative 11/12/2020 08:06 PM  
 WBC 0-4 11/12/2020 08:06 PM  
 RBC 10-20 11/12/2020 08:06 PM  
 
 
 
Medications Reviewed:  
 
Current Facility-Administered Medications Medication Dose Route Frequency  vancomycin (VANCOCIN) 1750 mg in  ml infusion  1,750 mg IntraVENous Q24H  
 metoprolol succinate (TOPROL-XL) XL tablet 25 mg  25 mg Oral DAILY  haloperidol lactate (HALDOL) injection 2 mg  2 mg IntraVENous Q8H PRN  
 enoxaparin (LOVENOX) injection 90 mg  90 mg SubCUTAneous Q12H  
 insulin glargine (LANTUS) injection 15 Units  15 Units SubCUTAneous DAILY  sodium chloride (NS) flush 5-40 mL  5-40 mL IntraVENous Q8H  
 sodium chloride (NS) flush 5-40 mL  5-40 mL IntraVENous PRN  
 acetaminophen (TYLENOL) tablet 650 mg  650 mg Oral Q6H PRN Or  
 acetaminophen (TYLENOL) suppository 650 mg  650 mg Rectal Q6H PRN  polyethylene glycol (MIRALAX) packet 17 g  17 g Oral DAILY PRN  promethazine (PHENERGAN) tablet 12.5 mg  12.5 mg Oral Q6H PRN Or  
 ondansetron (ZOFRAN) injection 4 mg  4 mg IntraVENous Q6H PRN  
 lactobac ac& pc-s.therm-b.anim (SENDY Q/RISAQUAD)  1 Cap Oral DAILY  cefepime (MAXIPIME) 2 g in 0.9% sodium chloride (MBP/ADV) 100 mL MBP  2 g IntraVENous Q12H  insulin lispro (HUMALOG) injection   SubCUTAneous AC&HS  
 glucose chewable tablet 16 g  4 Tab Oral PRN  
 glucagon (GLUCAGEN) injection 1 mg  1 mg IntraMUSCular PRN  
 dextrose 10 % infusion 125-250 mL  125-250 mL IntraVENous PRN  Vancomycin Pharmacy Dosing   Other Rx Dosing/Monitoring  insulin lispro (HUMALOG) injection 5 Units  5 Units SubCUTAneous TIDAC  clopidogreL (PLAVIX) tablet 75 mg  75 mg Oral DAILY  sodium chloride (NS) flush 5-10 mL  5-10 mL IntraVENous PRN  
 
______________________________________________________________________ EXPECTED LENGTH OF STAY: 4d 4h 
ACTUAL LENGTH OF STAY:          5 Gautam Gustafson MD

## 2020-11-17 NOTE — PROGRESS NOTES
Cardiology Consult/Progress Note 11/12/2020  2:06 PM  
 
Subjective: 
Still confused and difficult to understand. Received eating breakfast this morning. Continue supportive care. Plavix, lovenox. Poor OAC candidate but recommended for LV apical thrombus temporarily. If going to NH/rehab would continue anticoagulation Lovenox is fine for now. Assessment and PLAN 1. Acute Rhabdomyolysis 
 - found down, suspect longer than 24 hours - CK 1347, CKMB 110, index 8.2 
 -  IVF therapy 2. NSTEMI 
 - Trop initially 54 trending down to 33. I suspect he has a high burden of obstructive severe coronary  
    artery disease. Troponin release may be demand related 
 - EKG with Afib RVR, PVC and underlying conduction delay with bifascicular block  
 - will not treat as STEMI given lack of ongoing chest discomfort. ST changes could represent secondary repolarization changes from intraventricular conduction  
   delay/bifascicular block 
 - Echo demonstrates severe LV dysfunction, severe apical hypokinesis with with LV apical thrombus. 11/12/20 ECHO ADULT COMPLETE 11/13/2020 11/13/2020 Narrative · Contrast used: DEFINITY. · LV: Estimated LVEF is <15%. Mildly dilated left ventricle. Decreased  
wall thickness. Severely and segmentally reduced systolic function. Moderate (grade 2) left ventricular diastolic dysfunction. · MV: Mitral valve non-specific thickening. Mild mitral valve  
regurgitation is present. · Large apical thrombus measuring 8 by 12 mm in size. Associated with  
apical severe hypokinesis. · Finding consistent with severe ischemic cardiomyopathy vs at a lesser  
likelihood of stress cardiomyopathy Signed by: Julio Martin MD  
3. Suspected sepsis in conjunction with severe LV systolic dysfunction - LAC 3.6 
 - WBC 18.6 
 - pan Cx 4. Afib 
 - Rate controlled. Dd low dose BB 
 - Not a candidate for [place]term [address]. - For short-term given his LV apical thrombus with suggest 6 to 8 weeks of anticoagulation with Eliquis at discharge. After 8 weeks can d/c Eliquis if at fall risk an continue Plavix monotherapy. On the other hand if no fall risk, then continue Eliquis only and d/c Plavix at 8 weeks. if he goes to a nursing home where he can be monitored closely. 5. MAN 
 - suspect from dehydration - resolved - IVF 6. DM II 
 - A1c 8.2 
 - SSI per Primary team 
7. Dementia 
 - seems baseline 8. H/o CVA 
 - left sided affect 
 - on Plavix 9. HFrEF:  
 - Initiated Low dose BB  
 - BP too low at this time to initiate Lisinopril. Recommend starting Low dose Lisinopril when BP tolerates. -Consider maintenance 20 mg daily lasix at discharge Mr. Indy Smith is an 49-year-old gentleman with mild cognitive dysfunction, prior stroke with residual left sided hemiparesis who presented to the emergency room after found down by the squad. He does not report any chest discomfort or shortness of breath at arrival.  His ECG suggests underlying intraventricular conduction delay with ST segment shifts which may suggest either remote or possible recent infarct/injury versus secondary changes to intraventricular conduction delay. Presence of elevated troponin and CK-MB index suggest likely myocardial injury. Overall this picture is suggestive of more chronic ischemic cardiomyopathy with possible superimposed acute ischemia. I suspect based on his echocardiogram that patient has severe/critical multivessel disease. Ideally would consider anticoagulant and aspirin at discharge. However given recent fall uncertain if he is a good candidate for long-term anticoagulation. I do not feel that patient understood our discussion regarding his cardiac condition or need for above-mentioned medical therapy. I suggested that we discussed final goals of care with him.   If he ends up going in a nursing home where he can be monitored closely, will strongly recommend use of at least short-term 6 to 8 weeks of anticoagulation mostly for LV apical thrombus. He is cleared from cardiology standpoint for discharge. Given his current diagnoses with end-stage heart failure, significant coronary artery disease as well as moderate to severe dementia it is recommended to continue only medical management at this time. He needs no further cardiac testing or intervention, recommend discharging on current meds. We will see again as needed. [x]    High complexity decision making was performed 
[x]    Patient is at high-risk of decompensation with multiple organ involvement Referring physician Dr. Zia Stauffer,: 
HPI: Kia De Paz is a 80 y.o. male who was brought to [facility] emergency room after being found down. Lucien was called by his neighbors who hold noises from his house overnight and earlier this morning. When the squad reached the site patient was found on the floor leaning his head on the wall. He was not unconscious. He said that he was on the floor overnight and was unable to move. He has prior history of stroke with residual left hemiparesis although has been living in independent living. He denies any symptoms of syncope, chest discomfort, significant shortness of breath. Lucien performed an EKG on the way and were concerned about possible ST elevation myocardial infarction. Hence cardiology was consulted. Investigation ECG: ECG shows atrial fibrillation with rapid ventricular rate, PVCs, underlying intraventricular conduction delay with bifascicular block pattern. There are ST segment changes which may suggest ischemia/infarction. However in the absence of any active chest discomfort I highly doubt that these reflect true MI despite ST segment being concordant in lead V2 and V3.   ST segment changes in V4 are discordant and less than 5 mm. There are no obvious reciprocal changes noted. Echocardiogram: Not performed White cell count 22.8 Troponin 3 PM: 54 Total CK 1365; CK-; CK-MB index 8.2 Review of Symptoms: 
Could not be performed because of patient being unable to answer all the questions. Patient Active Problem List  
 Diagnosis Date Noted  NSTEMI (non-ST elevated myocardial infarction) ([facility] [address] 11/12/2020 UNKNOWN 
No past medical history on file. No past surgical history on file. Social History Socioeconomic History  Marital status:  Spouse name: Not on file  Number of children: Not on file  Years of education: Not on file  Highest education level: Not on file No family history on file. Current Facility-Administered Medications Medication Dose Route Frequency  vancomycin (VANCOCIN) 1750 mg in  ml infusion  1,750 mg IntraVENous Q24H  
 metoprolol succinate (TOPROL-XL) XL tablet 25 mg  25 mg Oral DAILY  haloperidol lactate (HALDOL) injection 2 mg  2 mg IntraVENous Q8H PRN  
 enoxaparin (LOVENOX) injection 90 mg  90 mg SubCUTAneous Q12H  
 insulin glargine (LANTUS) injection 15 Units  15 Units SubCUTAneous DAILY  sodium chloride (NS) flush 5-40 mL  5-40 mL IntraVENous Q8H  
 sodium chloride (NS) flush 5-40 mL  5-40 mL IntraVENous PRN  
 acetaminophen (TYLENOL) tablet 650 mg  650 mg Oral Q6H PRN Or  
 acetaminophen (TYLENOL) suppository 650 mg  650 mg Rectal Q6H PRN  polyethylene glycol (MIRALAX) packet 17 g  17 g Oral DAILY PRN  promethazine (PHENERGAN) tablet 12.5 mg  12.5 mg Oral Q6H PRN Or  
 ondansetron (ZOFRAN) injection 4 mg  4 mg IntraVENous Q6H PRN  
 lactobac ac& pc-s.therm-b.anim (SENDY Q/RISAQUAD)  1 Cap Oral DAILY  cefepime (MAXIPIME) 2 g in 0.9% sodium chloride (MBP/ADV) 100 mL MBP  2 g IntraVENous Q12H  
 insulin lispro (HUMALOG) injection   SubCUTAneous AC&HS  
  glucose chewable tablet 16 g  4 Tab Oral PRN  
 glucagon (GLUCAGEN) injection 1 mg  1 mg IntraMUSCular PRN  
 dextrose 10 % infusion 125-250 mL  125-250 mL IntraVENous PRN  Vancomycin Pharmacy Dosing   Other Rx Dosing/Monitoring  insulin lispro (HUMALOG) injection 5 Units  5 Units SubCUTAneous TIDAC  clopidogreL (PLAVIX) tablet 75 mg  75 mg Oral DAILY  sodium chloride (NS) flush 5-10 mL  5-10 mL IntraVENous PRN None No Known Allergies Labs:  
Recent Results (from the past 24 hour(s)) GLUCOSE, POC Collection Time: 11/16/20 11:46 AM  
Result Value Ref Range Glucose (POC) 230 (H) 65 - 100 mg/dL Performed by Walker Valente, POC Collection Time: 11/16/20  5:09 PM  
Result Value Ref Range Glucose (POC) 127 (H) 65 - 100 mg/dL Performed by Demetrius Hernandez, POC Collection Time: 11/16/20  9:29 PM  
Result Value Ref Range Glucose (POC) 75 65 - 100 mg/dL Performed by Consuelo Hedrick METABOLIC PANEL, BASIC Collection Time: 11/17/20  3:18 AM  
Result Value Ref Range Sodium 138 136 - 145 mmol/L Potassium 3.9 3.5 - 5.1 mmol/L Chloride 109 (H) 97 - 108 mmol/L  
 CO2 25 21 - 32 mmol/L Anion gap 4 (L) 5 - 15 mmol/L Glucose 88 65 - 100 mg/dL BUN 33 (H) 6 - 20 MG/DL Creatinine 0.98 0.70 - 1.30 MG/DL  
 BUN/Creatinine ratio 34 (H) 12 - 20 GFR est AA >60 >60 ml/min/1.73m2 GFR est non-AA >60 >60 ml/min/1.73m2 Calcium 9.3 8.5 - 10.1 MG/DL  
CBC WITH AUTOMATED DIFF Collection Time: 11/17/20  3:18 AM  
Result Value Ref Range WBC 18.6 (H) 4.1 - 11.1 K/uL  
 RBC 4.78 4.10 - 5.70 M/uL  
 HGB 14.7 12.1 - 17.0 g/dL HCT 44.2 36.6 - 50.3 % MCV 92.5 80.0 - 99.0 FL  
 MCH 30.8 26.0 - 34.0 PG  
 MCHC 33.3 30.0 - 36.5 g/dL  
 RDW 14.1 11.5 - 14.5 % PLATELET 892 731 - 771 K/uL NRBC 0.0 0  WBC ABSOLUTE NRBC 0.00 0.00 - 0.01 K/uL NEUTROPHILS 57 32 - 75 % LYMPHOCYTES 28 12 - 49 % MONOCYTES 13 5 - 13 % EOSINOPHILS 1 0 - 7 % BASOPHILS 0 0 - 1 % IMMATURE GRANULOCYTES 1 (H) 0.0 - 0.5 % ABS. NEUTROPHILS 10.6 (H) 1.8 - 8.0 K/UL  
 ABS. LYMPHOCYTES 5.2 (H) 0.8 - 3.5 K/UL  
 ABS. MONOCYTES 2.4 (H) 0.0 - 1.0 K/UL  
 ABS. EOSINOPHILS 0.2 0.0 - 0.4 K/UL  
 ABS. BASOPHILS 0.0 0.0 - 0.1 K/UL  
 ABS. IMM. GRANS. 0.2 (H) 0.00 - 0.04 K/UL  
 DF SMEAR SCANNED    
 PLATELET COMMENTS Large Platelets RBC COMMENTS ANISOCYTOSIS 1+ 
    
 RBC COMMENTS YECENIA CELLS 
PRESENT 
    
GLUCOSE, POC Collection Time: 11/17/20  6:34 AM  
Result Value Ref Range Glucose (POC) 153 (H) 65 - 100 mg/dL Performed by [facility] Intake/Output Summary (Last 24 hours) at 11/17/2020 1561 Last data filed at 11/16/2020 2338 Gross per 24 hour Intake 580 ml Output  Net 580 ml Patient Vitals for the past 24 hrs: 
 Temp Pulse Resp BP SpO2  
11/17/20 0805 97.3 °F (36.3 °C) 92 16 109/71   
11/17/20 0315 98 °F (36.7 °C) 98 18 109/69 96 % 11/16/20 2338 98.6 °F (37 °C) 94 20 100/84 96 % 11/16/20 2046 98.2 °F (36.8 °C) 98 18 121/68 99 % 11/16/20 1547 98 °F (36.7 °C) 94 22 111/71 98 % 11/16/20 1143 98 °F (36.7 °C) (!) 102 22 120/65 93 % General:    Not clearly oriented  Alert this am.  
Psychiatric:    Unable to assess Eye/ENT:      Pupils equal, No asymmetry, Conjunctival pink. Able to hear voice at normal amplitude. Evidence of trauma to the scalp Lungs:      Visibly symmetric chest expansion, No palpable tenderness. Clear to auscultation bilaterally. Heart[de-identified]     Variable S1 and S2, irregular rate. Wide split second heart sound. systolic murmur, no click, rub or gallop. No JVD, Normal palpable peripheral pulses. No cyanosis Abdomen:     Soft, non-tender. Bowel sounds normal. No masses,  No   
  organomegaly. Extremities:   Extremities normal, atraumatic, no edema. Neurologic:   Left upper and lower extremity weakness MD Blake Mei PA-C 
 
11/17/2020  
9:10 AM 
  
 
 [facility] Office:   [address] [facility] Office: 
[address]    [address] [facility] [address]     [address] P: [id number]    P: [phone] F: [phone]    F: [phone]

## 2020-11-17 NOTE — PROGRESS NOTES
Transition of Care Plan: 
 
 
* RUR 14% * Disposition- TBD- Nephew working on [facility] placement with plan for hospice services * Palliative care Following for ACP Abi Cordero has been admitted due to NSTEMI, coronary artery disease, ischemic cardiomyopathy EF 15%, apical thrombus/ hypokinesis. PMH significant for CVA and dementia. Elisabet Valle, [address] is currently exploring [facility] options for patient and requested to call this writer back later today. The plan is for patient to be placed in assisted living facility as patient is unable to return to his independent apartment at [facility]. Patient/family are planning to have hospice at [facility]. 1 PM- Mr. Maria Del Rosario Freitas, Liaison with [facility] [phone] advised he just completed meeting with Mr. Elisabet Valle regarding patient being admitted to [facility]. Mr. Maria Del Rosario Freitas needs to do a bedside assessment with patient tomorrow at 9:30. Provided Mr. Maria Del Rosario Freitas the contact number for Noemí Howard [phone] to help coordinate the visitation for 11/18. Mary Bui LCSW, [facility] Complex Care Coordinator/[place] C: [phone]

## 2020-11-17 NOTE — PROGRESS NOTES
1600 Bedside shift change report given to Glenny Yanes, RN and Yusra Aquino RN (oncoming nurse) by Joana Roberts RN (offgoing nurse). Report included the following information SBAR, Kardex, Intake/Output, MAR and Recent Results. 1930 Bedside shift change report given to Jenna Carter RN (oncoming nurse) by Glenny Yanes RN and Yusra Aquino RN (offgoing nurse). Report included the following information SBAR, Kardex, Intake/Output, MAR and Recent Results.

## 2020-11-18 NOTE — PROGRESS NOTES
Problem: Self Care Deficits Care Plan (Adult) Goal: *Acute Goals and Plan of Care (Insert Text) Description: FUNCTIONAL STATUS PRIOR TO ADMISSION: Patient was modified independent using a rolling walker for functional mobility. Patient lived in an [address] [place] [address] Road. Nephew reports patient has been having difficulty remembering to pay his rent on time. HOME SUPPORT: The patient lived alone with family members nearby to provide assistance. Occupational Therapy Goals Initiated 11/17/2020 1. Patient will perform grooming seated unsupported EOB with supervision/set-up within 7 day(s). 2.  Patient will perform UB bathing with moderate assistance  within 7 day(s) and < 3 verbal cues. 3.  Patient will perform lower body dressing with maximal assistance within 7 day(s) with verbal cueing and prompting as needed. 4.  Patient will perform toilet transfers to bedside commode with moderate assistance  within 7 day(s) using AE PRN. Outcome: Progressing Towards Goal 
 OCCUPATIONAL THERAPY TREATMENT Patient: Lucy Bran (61 y.o. male) Date: 11/18/2020 Diagnosis: NSTEMI (non-ST elevated myocardial infarction) ([facility] [address] [I21.4] NSTEMI (non-ST elevated myocardial infarction) ([facility] [address] Precautions: Fall, DNR Chart, occupational therapy assessment, plan of care, and goals were reviewed. ASSESSMENT Patient continues with skilled OT services and is progressing towards goals. Patient continues to be limited by progressing dementia with tangential speech, decreased attention to task, difficulty command following, and requiring heavy verbal, tactile, and visual cues for participation in functional activity. Patient required MIN A x 2 for supine > sit transfers and for functional mobility to chair using a walker. Patient seen with PT due to need for 2 skilled therapists to maximize patient and staff safety.  Patient demonstrated good balance using walker and would benefit from continued OOB activity for pressure relief, skin integrity, and overall health/wellbeing. Continue to recommend discharge to [facility] with supervision and total assistance for all ADL tasks due to difficulty sequencing and problem-solving. Current Level of Function Impacting Discharge (ADLs): MOD-MAX A for LB ADLs using walker when standing; TOTAL A for bathing tasks 2* cognition; MIN A x 2 for transfers using RW Other factors to consider for discharge: cognition (safety awareness; insight into deficits, attention, etc.); family requesting hospice at [facility] PLAN : 
Patient continues to benefit from skilled intervention to address the above impairments. Continue treatment per established plan of care. to address goals. Recommend with staff: OOB x 3 to chair with 2 assist 
 
Recommend next OT session: UB bathing seated EOB Recommendation for discharge: (in order for the patient to meet his/her long term goals) [facility] with hospice This discharge recommendation: 
Has been made in collaboration with the attending provider and/or case management IF patient discharges home will need the following DME: patient owns DME required for discharge SUBJECTIVE:  
Patient stated I was telling them about [place] (tangential speech).  OBJECTIVE DATA SUMMARY:  
Cognitive/Behavioral Status: 
Neurologic State: Alert;Confused Orientation Level: Oriented to person;Disoriented to time;Disoriented to situation;Disoriented to place Cognition: Decreased attention/concentration;Poor safety awareness;Decreased command following Perception: Appears intact Perseveration: Perseverates during conversation Safety/Judgement: Lack of insight into deficits Functional Mobility and Transfers for ADLs: 
Bed Mobility: 
Supine to Sit: Maximum assistance(pt initiating task but got distracted with story. ) Transfers: 
Sit to Stand: Minimum assistance; Moderate assistance(decrease sequencing and hand position) Balance: 
Sitting: Impaired Sitting - Static: Good (unsupported) Sitting - Dynamic: Fair (occasional) Standing: Impaired Standing - Static: Fair Standing - Dynamic : Fair ADL Intervention: 
  
 
 Patient required MIN A x 2 with max cueing and use of RW for functional mobility to window and subsequently EOB following patient request for rest break. Patient then MIN A using RW for bed > chair transfer. Patient left seated in recliner with chair alarm for safety. Lower Body Dressing Assistance Dressing Assistance: Moderate assistance(simulated w/ A x 2) Cognitive Retraining Safety/Judgement: Lack of insight into deficits Activity Tolerance:  
Fair and requires rest breaks, VSS After treatment patient left in no apparent distress:  
Sitting in chair, Call bell within reach, Bed / chair alarm activated, and Caregiver / family present COMMUNICATION/COLLABORATION:  
The patients plan of care was discussed with: Physical therapist and Registered nurse. LYNN Pandey Backer Time Calculation: 30 mins

## 2020-11-18 NOTE — PROGRESS NOTES
0730: Bedside and Verbal shift change report given to Rick Aldana RN (oncoming nurse) by Bryant Keys RN (offgoing nurse). Report included the following information SBAR, Kardex, Intake/Output, MAR, Recent Results and Cardiac Rhythm A-fib; BBB.  
 
1505: PT working with patient to get him up in the chair. 1930: Bedside and Verbal shift change report given to Aron Strauss RN (oncoming nurse) by Rick Aldana RN (offgoing nurse).  Report included the following information SBAR, Kardex, Intake/Output, MAR, Recent Results and Cardiac Rhythm A-fib; BBB.

## 2020-11-18 NOTE — PROGRESS NOTES
Hospitalist Progress Note Dimple Graves MD 
Answering service: [phone] from in house phone Date of Service:  2020 NAME:  Iram Gao :  1931 MRN:  [id number] Admission Summary: As per initial admission summary This is an 40-year-old man with past medical history significant for dementia, status post CVA, and type 2 diabetes, who was in his usual state of health until the day of his presentation at the emergency room when the patient was found on the floor at the assisted living facility where the patient resides. According to report, the patient was feeling hot all night long, he got up, felt weak and his legs gave out on him, the patient collapsed to the floor and unable to get up. His neighbor heard him screaming all night long. EMS was called. When the EMS arrived at the scene, the patient's EKG shows possible ST elevation myocardial infarction. This was called to the emergency room and the patient was brought to the emergency room as a code ST-elevation myocardial infarction. When the patient arrived at the emergency room, the patient's EKG was reviewed by the cardiologist and code ST-elevation myocardial infarction was canceled. The patient has significant lab abnormalities including significant leukocytosis and elevated troponin level. The patient remained confused in the emergency room, unable to provide good history. The patient was seen by the cardiologist in the emergency room. Conservative treatment was advised. The patient was also seen by the intensivist for evaluation for ICU admission. The patient is not a candidate for ICU admission according to the intensivist.  The patient was subsequently referred to the hospitalist service for evaluation for admission. The patient has no record of prior admission to this hospital. 
 
Interval history / Subjective: Patient seen for Follow up of NSTEMI Patient seen and examined by the bedside, Labs, images and notes reviewed Palliative team consulted . As per palliative will consult hospice before discharge. For now now we will continue current management. patient appears to be confused. Still on abx. Cardiology signed off. ID following still on broad spectrum abx. CM working on placement. likely [facility] with hospice. Talked to Mr. Jackson updated him regarding patient condition. Still has leucocytosis. Patient is still confused. I d/w MR. Jackson (emergency contact who is not POA). Previously patient was able to communicate but sometimes had confusion episodes) Assessment & Plan: 1. Non-ST elevation myocardial infarction:   
on Plavix,  
heparin drip was stopped  as per cardiology Echocardiogram , · Estimated LVEF is <15%. Mildly dilated left ventricle. Decreased wall thickness. Severely and segmentally reduced systolic function. Moderate (grade 2) left ventricular diastolic dysfunction. · Large apical thrombus measuring 8 by 12 mm in size. Associated with apical severe hypokinesis. likely ischemic cardiomyopathy Not a good candidate for any invasive therapies, recommended conservative management Cardiology following On lovenox #LV apical thrombus May need 6-8 weeks of anticoagulation as per cardiology Not a good candidate for long term anticoagulation Will appreciate cardiology recommendations before discharge regarding oral anticoagulation On lovenox 2. Sepsis:  improving  
 significant leukocytosis, elevated lactic acid level, and tachycardia on presentation. No clear source of infection at presentation CT chest , Small bilateral pleural effusions with mild bibasilar atelectasis. 
  CT scan of the abdomen and pelvis, negative for any acute changes  
 on vancomycin and cefepime. We will await blood culture results. Patient already on vancomycin Today some downtrend of leucocytosis As per CT maxifacial surgery: Right third maxillary molar demonstrates dental caries. There is a periapical lucency around the right third maxillary molar may represent a periapical 
abscess as well as inflammation in the adjacent gingiva. ID consulted , following, blood culture gram positive cocci 1/2 ID wants to continue current therapy till 11/20 #lactic acidosis, improved Low bicarb resolved now Likely due to underlying infection #hyperkalemia, resolved BMP in AM 
 
3.   atrial fibrillation: It is not clear whether the patient has history of atrial fibrillation. Cardiology following Not a good candidate for long term anticoagulation . 4.  Acute kidney injury:  resolved This is most likely due to volume depletion. We will carry out fluid therapy and monitor the patient's renal function. BMP in AM 
If continues to get wore will involve nephrology 5. Acute rhabdomyolysis:  improved This is as a result of the patient being found on the floor of his apartment at the assisted living facility. We will carry out fluid therapy and monitor the patient closely. . Improved 6. Abnormal liver function tests: The patient may require Hepatology consult if the abnormality is persistent or getting worse. 7.  Type 2 diabetes with hyperglycemia:  
 The patient will be placed on sliding scale with insulin coverage. Added lantus 15 units . May Need further adjustment. 8.  Syncope: resolved MRI and MRA negative for any stroke 9. Acute delirium:  waxing and waning This is most likely due to metabolic event. CT scan of the head is negative. Will monitor for now 10. Fall:  The patient was found on the floor at the assisted living facility. CT scan of the head is negative. CT scan of the cervical spine did not show any acute fracture.    
PT/OT consult , needs [facility] vs SNF 
 
 11.  Dementia:  The patient most likely has underlying memory impairment. We will carry out supportive treatment. 12.  Hypercalcemia:   
IV fluids Code status: Full code DVT prophylaxis: on heparin drip Care Plan discussed with: Patient/Family Disposition: SLF vs SNF Hospital Problems  Date Reviewed: 11/12/2020 Codes Class Noted POA * (Principal) NSTEMI (non-ST elevated myocardial infarction) ([facility] [address] ICD-10-CM: I21.4 ICD-9-CM: 410.70  11/12/2020 Yes Review of Systems: A comprehensive review of systems was negative except for that written in the HPI. Vital Signs:  
 Last 24hrs VS reviewed since prior progress note. Most recent are: 
Visit Vitals /62 (BP 1 Location: Left arm, BP Patient Position: At rest) Pulse 98 Temp 97.7 °F (36.5 °C) Resp 18 Ht 6' 2\" (1.88 m) Wt 88.7 kg (195 lb 8.8 oz) SpO2 96% BMI 25.11 kg/m² Intake/Output Summary (Last 24 hours) at 11/18/2020 5423 Last data filed at 11/17/2020 2254 Gross per 24 hour Intake 890 ml Output  Net 890 ml Physical Examination:  
I had a face to face encounter with this patient and independently examined them on November 18, 2020 as outlined below: 
     
Constitutional:  ill appearing patient , appears to be confused ENT:  Oral mucous moist, oropharynx benign. Neck supple, Resp:  CTA bilaterally. No wheezing/rhonchi/rales. No accessory muscle use CV:  Regular rhythm, normal rate, no murmurs, gallops, rubs GI:  Soft, non distended, non tender. normoactive bowel sounds, no hepatosplenomegaly Musculoskeletal:  No edema, warm, 2+ pulses throughout Data Review:  
 Review and/or order of clinical lab test 
Review and/or order of tests in the radiology section of CPT Review and/or order of tests in the medicine section of CPT Labs:  
 
Recent Labs 11/17/20 
3947 11/16/20 
2285 WBC 18.6* 19.8* HGB 14.7 14.4 HCT 44.2 42.6  169 Recent Labs 11/17/20 
8424 11/16/20 
7134  143  
K 3.9 3.1*  
* 112* CO2 25 24 BUN 33* 40* CREA 0.98 1.02  
GLU 88 119* CA 9.3 9.0 No results for input(s): ALT, AP, TBIL, TBILI, TP, ALB, GLOB, GGT, AML, LPSE in the last 72 hours. No lab exists for component: SGOT, GPT, AMYP, HLPSE No results for input(s): INR, PTP, APTT, INREXT, INREXT in the last 72 hours. No results for input(s): FE, TIBC, PSAT, FERR in the last 72 hours. No results found for: FOL, RBCF No results for input(s): PH, PCO2, PO2 in the last 72 hours. No results for input(s): CPK, CKNDX, TROIQ in the last 72 hours. No lab exists for component: CPKMB No results found for: CHOL, CHOLX, CHLST, CHOLV, HDL, HDLP, LDL, LDLC, DLDLP, TGLX, TRIGL, TRIGP, CHHD, CHHDX Lab Results Component Value Date/Time Glucose (POC) 146 (H) 11/18/2020 07:27 AM  
 Glucose (POC) 151 (H) 11/17/2020 10:04 PM  
 Glucose (POC) 157 (H) 11/17/2020 04:48 PM  
 Glucose (POC) 147 (H) 11/17/2020 11:35 AM  
 Glucose (POC) 153 (H) 11/17/2020 06:34 AM  
 
Lab Results Component Value Date/Time Color YELLOW/STRAW 11/12/2020 08:06 PM  
 Appearance CLEAR 11/12/2020 08:06 PM  
 Specific gravity 1.025 11/12/2020 08:06 PM  
 pH (UA) 5.0 11/12/2020 08:06 PM  
 Protein 100 (A) 11/12/2020 08:06 PM  
 Glucose >1,000 (A) 11/12/2020 08:06 PM  
 Ketone 15 (A) 11/12/2020 08:06 PM  
 Bilirubin Negative 11/12/2020 08:06 PM  
 Urobilinogen 0.2 11/12/2020 08:06 PM  
 Nitrites Negative 11/12/2020 08:06 PM  
 Leukocyte Esterase Negative 11/12/2020 08:06 PM  
 Epithelial cells FEW 11/12/2020 08:06 PM  
 Bacteria Negative 11/12/2020 08:06 PM  
 WBC 0-4 11/12/2020 08:06 PM  
 RBC 10-20 11/12/2020 08:06 PM  
 
 
 
Medications Reviewed:  
 
Current Facility-Administered Medications Medication Dose Route Frequency  vancomycin (VANCOCIN) 1750 mg in  ml infusion  1,750 mg IntraVENous Q24H  metoprolol succinate (TOPROL-XL) XL tablet 25 mg  25 mg Oral DAILY  haloperidol lactate (HALDOL) injection 2 mg  2 mg IntraVENous Q8H PRN  
 enoxaparin (LOVENOX) injection 90 mg  90 mg SubCUTAneous Q12H  
 insulin glargine (LANTUS) injection 15 Units  15 Units SubCUTAneous DAILY  sodium chloride (NS) flush 5-40 mL  5-40 mL IntraVENous Q8H  
 sodium chloride (NS) flush 5-40 mL  5-40 mL IntraVENous PRN  
 acetaminophen (TYLENOL) tablet 650 mg  650 mg Oral Q6H PRN Or  
 acetaminophen (TYLENOL) suppository 650 mg  650 mg Rectal Q6H PRN  polyethylene glycol (MIRALAX) packet 17 g  17 g Oral DAILY PRN  promethazine (PHENERGAN) tablet 12.5 mg  12.5 mg Oral Q6H PRN Or  
 ondansetron (ZOFRAN) injection 4 mg  4 mg IntraVENous Q6H PRN  
 lactobac ac& pc-s.therm-b.anim (SENDY Q/RISAQUAD)  1 Cap Oral DAILY  cefepime (MAXIPIME) 2 g in 0.9% sodium chloride (MBP/ADV) 100 mL MBP  2 g IntraVENous Q12H  
 insulin lispro (HUMALOG) injection   SubCUTAneous AC&HS  
 glucose chewable tablet 16 g  4 Tab Oral PRN  
 glucagon (GLUCAGEN) injection 1 mg  1 mg IntraMUSCular PRN  
 dextrose 10 % infusion 125-250 mL  125-250 mL IntraVENous PRN  Vancomycin Pharmacy Dosing   Other Rx Dosing/Monitoring  insulin lispro (HUMALOG) injection 5 Units  5 Units SubCUTAneous TIDAC  clopidogreL (PLAVIX) tablet 75 mg  75 mg Oral DAILY  sodium chloride (NS) flush 5-10 mL  5-10 mL IntraVENous PRN  
 
______________________________________________________________________ EXPECTED LENGTH OF STAY: 4d 4h 
ACTUAL LENGTH OF STAY:          6 Venu Sanchez MD

## 2020-11-18 NOTE — PROGRESS NOTES
Problem: Mobility Impaired (Adult and Pediatric) Goal: *Acute Goals and Plan of Care (Insert Text) Description: FUNCTIONAL STATUS PRIOR TO ADMISSION: Patient was modified independent using a rolling walker for functional mobility. HOME SUPPORT PRIOR TO ADMISSION: The patient lived in [address] [place] [address]. Physical Therapy Goals Initiated 11/16/2020 1. Patient will move from supine to sit and sit to supine , scoot up and down, and roll side to side in bed with minimal assistance/contact guard assist within 7 day(s). 2.  Patient will transfer from bed to chair and chair to bed with minimal assistance/contact guard assist using the least restrictive device within 7 day(s). 3.  Patient will perform sit to stand with minimal assistance/contact guard assist within 7 day(s). 4.  Patient will ambulate with minimal assistance/contact guard assist for 10 feet with the least restrictive device within 7 day(s). Outcome: Progressing Towards Goal 
 
PHYSICAL THERAPY TREATMENT Patient: Missy De Paz (61 y.o. male) Date: 11/18/2020 Diagnosis: NSTEMI (non-ST elevated myocardial infarction) ([facility] [address] [I21.4] NSTEMI (non-ST elevated myocardial infarction) ([facility] [address] Precautions: Fall, DNR Chart, physical therapy assessment, plan of care and goals were reviewed. ASSESSMENT Patient continues with skilled PT services and is progressing towards goals. Pt pleasantly confused, very talkative making staying on task difficult. When on task pt can initiate commands. Pt was able to stand and ambulate a short distance but reports fatigue with task. Pt agreeable to sit up in a chair. Due to pt inconsistency and confusion would recommend assistance x2 with rolling walker. Current Level of Function Impacting Discharge (mobility/balance): min A x2 Other factors to consider for discharge: dementia, variable mobility PLAN : 
Patient continues to benefit from skilled intervention to address the above impairments. Continue treatment per established plan of care. to address goals. Recommendation for discharge: (in order for the patient to meet his/her long term goals) [facility] vs HHPT This discharge recommendation: 
Has not yet been discussed the attending provider and/or case management IF patient discharges home will need the following DME: patient owns DME required for discharge SUBJECTIVE:  
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  
Critical Behavior: 
Neurologic State: Alert, Confused Orientation Level: Oriented to person, Disoriented to time, Disoriented to situation, Disoriented to place Cognition: Decreased attention/concentration, Poor safety awareness, Decreased command following Safety/Judgement: Lack of insight into deficits Functional Mobility Training: 
Bed Mobility: 
  
Supine to Sit: Maximum assistance(pt initiating task but got distracted with story. ) Transfers: 
Sit to Stand: Minimum assistance; Moderate assistance(decrease sequencing and hand position) Stand to Sit: Minimum assistance(decrease control and use of UE) Balance: 
Sitting: Impaired Sitting - Static: Good (unsupported) Sitting - Dynamic: Fair (occasional) Standing: Impaired Standing - Static: Fair Standing - Dynamic : Fair Ambulation/Gait Training: 
Distance (ft): 10 Feet (ft)(5 forward and backward) Assistive Device: Gait belt;Walker, rolling Ambulation - Level of Assistance: Contact guard assistance;Minimal assistance;Assist x2 Gait Abnormalities: Decreased step clearance Speed/Beverly: Slow Step Length: Left shortened;Right shortened Stairs: Therapeutic Exercises:  
 
Pain Rating: No complaints Activity Tolerance:  
Limited After treatment patient left in no apparent distress:  
Sitting in chair, Call bell within reach, Bed / chair alarm activated, and Caregiver / family present COMMUNICATION/COLLABORATION:  
The patients plan of care was discussed with: Registered nurse. Emily Keen PTA Time Calculation: 27 mins

## 2020-11-18 NOTE — PROGRESS NOTES
Patient repeatedly pulling heart monitor off. Between 1574-3815, patient removes electrodes 7 times, becoming increasingly agitated when staff places leads back on his chest. Patient educated on risks/benefits of tele monitoring and verbally states that he understands but does not want to be \"doctored\" at the moment because he needs rest. Patient is in guarded position and adamant about people not touching him. Hospitalist paged and discussed leaving patient off tele for now and possibly downgrading to medical floor later today. Patient's door remains open and educated patient on call bell usage. 0145- Patient awake and watching TV. Demands milk. Patient given milk but still refuses the monitoring electrodes despite being awake. States \" I will doctor myself until I get to a hospital that is reputable. This hospital is not reputable. \" Further expresses irritation about when he was denied water during admission (several days ago) and believes the MRI was unneccessary yesterday. Allowing administration of ABO's. 
 
0400- Patient back on monitor after while changing briefs. 0297- Patient claims the \"evil\" staff are poisoning him and is demanding \"FBI check the fluids\" (Cefepime). Patient rips ID band off while trying to rip IV out. Labs will not be drawn this morning

## 2020-11-18 NOTE — PROGRESS NOTES
Hospitalist Progress Note Praveena Slater MD 
Answering service: [phone] from in house phone Date of Service:  2020 NAME:  Yogesh Zacarias :  1931 MRN:  [id number] Admission Summary: As per initial admission summary This is an 80-year-old man with past medical history significant for dementia, status post CVA, and type 2 diabetes, who was in his usual state of health until the day of his presentation at the emergency room when the patient was found on the floor at the assisted living facility where the patient resides. According to report, the patient was feeling hot all night long, he got up, felt weak and his legs gave out on him, the patient collapsed to the floor and unable to get up. His neighbor heard him screaming all night long. EMS was called. When the EMS arrived at the scene, the patient's EKG shows possible ST elevation myocardial infarction. This was called to the emergency room and the patient was brought to the emergency room as a code ST-elevation myocardial infarction. When the patient arrived at the emergency room, the patient's EKG was reviewed by the cardiologist and code ST-elevation myocardial infarction was canceled. The patient has significant lab abnormalities including significant leukocytosis and elevated troponin level. The patient remained confused in the emergency room, unable to provide good history. The patient was seen by the cardiologist in the emergency room. Conservative treatment was advised. The patient was also seen by the intensivist for evaluation for ICU admission. The patient is not a candidate for ICU admission according to the intensivist.  The patient was subsequently referred to the hospitalist service for evaluation for admission. The patient has no record of prior admission to this hospital. 
 
Interval history / Subjective: Patient seen for Follow up of NSTEMI Patient seen and examined by the bedside, Labs, images and notes reviewed Palliative team consulted . As per palliative will consult hospice before discharge. For now now we will continue current management. patient appears to be confused. Still on abx. Cardiology signed off. ID following still on broad spectrum abx. CM working on placement. likely snf with hospice. Talked to Mr. Jackson updated him regarding patient condition. Still has leucocytosis. Patient is still confused. I d/w MR. Jackson (emergency contact who is not POA). Previously patient was able to communicate but sometimes had confusion episodes) Assessment & Plan: 1. Non-ST elevation myocardial infarction:   
on Plavix,  
heparin drip was stopped  as per cardiology Echocardiogram , · Estimated LVEF is <15%. Mildly dilated left ventricle. Decreased wall thickness. Severely and segmentally reduced systolic function. Moderate (grade 2) left ventricular diastolic dysfunction. · Large apical thrombus measuring 8 by 12 mm in size. Associated with apical severe hypokinesis. likely ischemic cardiomyopathy Not a good candidate for any invasive therapies, recommended conservative management Cardiology following On lovenox As per cards For short-term given his LV apical thrombus with suggest 6 to 8 weeks of anticoagulation with Eliquis at discharge. After 8 weeks can d/c Eliquis if at fall risk an continue Plavix monotherapy. On the other hand if no fall risk, then continue Eliquis only and d/c Plavix at 8 weeks. #LV apical thrombus May need 6-8 weeks of anticoagulation as per cardiology Not a good candidate for long term anticoagulation Will appreciate cardiology recommendations before discharge regarding oral anticoagulation On lovenox 2.   Sepsis:  improving  
 significant leukocytosis, elevated lactic acid level, and tachycardia on presentation. No clear source of infection at presentation CT chest , Small bilateral pleural effusions with mild bibasilar atelectasis. 
  CT scan of the abdomen and pelvis, negative for any acute changes  
 on vancomycin and cefepime. We will await blood culture results. Patient already on vancomycin Today some downtrend of leucocytosis As per CT maxifacial surgery: Right third maxillary molar demonstrates dental caries. There is a periapical lucency around the right third maxillary molar may represent a periapical 
abscess as well as inflammation in the adjacent gingiva. ID consulted , following, blood culture gram positive cocci 1/2 ID wants to continue current therapy till 11/20 #lactic acidosis, improved Low bicarb resolved now Likely due to underlying infection #hyperkalemia, resolved BMP in AM 
 
3.   atrial fibrillation: It is not clear whether the patient has history of atrial fibrillation. Cardiology following Not a good candidate for long term anticoagulation . 4.  Acute kidney injury:  resolved This is most likely due to volume depletion. We will carry out fluid therapy and monitor the patient's renal function. BMP in AM 
If continues to get wore will involve nephrology 5. Acute rhabdomyolysis:  improved This is as a result of the patient being found on the floor of his apartment at the assisted living facility. We will carry out fluid therapy and monitor the patient closely. . Improved 6. Abnormal liver function tests: The patient may require Hepatology consult if the abnormality is persistent or getting worse. 7.  Type 2 diabetes with hyperglycemia:  
 The patient will be placed on sliding scale with insulin coverage. Added lantus 15 units . May Need further adjustment. 8.  Syncope: resolved MRI and MRA negative for any stroke 9. Acute delirium:  waxing and waning This is most likely due to metabolic event. CT scan of the head is negative. Will monitor for now 10. Fall:  The patient was found on the floor at the assisted living facility. CT scan of the head is negative. CT scan of the cervical spine did not show any acute fracture. PT/OT consult , needs [facility] vs SNF 11. Dementia:  The patient most likely has underlying memory impairment. We will carry out supportive treatment. 12.  Hypercalcemia:   
IV fluids Code status: Full code DVT prophylaxis: on heparin drip Care Plan discussed with: Patient/Family Disposition: SLF vs SNF Hospital Problems  Date Reviewed: 11/12/2020 Codes Class Noted POA * (Principal) NSTEMI (non-ST elevated myocardial infarction) ([facility] [address] ICD-10-CM: I21.4 ICD-9-CM: 410.70  11/12/2020 Yes Review of Systems: A comprehensive review of systems was negative except for that written in the HPI. Vital Signs:  
 Last 24hrs VS reviewed since prior progress note. Most recent are: 
Visit Vitals BP (!) 107/53 (BP 1 Location: Left arm, BP Patient Position: Sitting) Pulse 86 Temp 98.1 °F (36.7 °C) Resp 18 Ht 6' 2\" (1.88 m) Wt 88.7 kg (195 lb 8.8 oz) SpO2 96% BMI 25.11 kg/m² Intake/Output Summary (Last 24 hours) at 11/18/2020 1758 Last data filed at 11/18/2020 1504 Gross per 24 hour Intake 1070 ml Output  Net 1070 ml Physical Examination:  
I had a face to face encounter with this patient and independently examined them on November 18, 2020 as outlined below: 
     
Constitutional:  ill appearing patient , appears to be confused ENT:  Oral mucous moist, oropharynx benign. Neck supple, Resp:  CTA bilaterally. No wheezing/rhonchi/rales. No accessory muscle use CV:  Regular rhythm, normal rate, no murmurs, gallops, rubs GI:  Soft, non distended, non tender. normoactive bowel sounds, no hepatosplenomegaly Musculoskeletal:  No edema, warm, 2+ pulses throughout Data Review:  
 Review and/or order of clinical lab test 
Review and/or order of tests in the radiology section of CPT Review and/or order of tests in the medicine section of CPT Labs:  
 
Recent Labs 11/18/20 
7246 11/17/20 
6866 WBC 17.5* 18.6* HGB 15.2 14.7 HCT 46.0 44.2  187 Recent Labs 11/17/20 
6269 11/16/20 
5549  143  
K 3.9 3.1*  
* 112* CO2 25 24 BUN 33* 40* CREA 0.98 1.02  
GLU 88 119* CA 9.3 9.0 No results for input(s): ALT, AP, TBIL, TBILI, TP, ALB, GLOB, GGT, AML, LPSE in the last 72 hours. No lab exists for component: SGOT, GPT, AMYP, HLPSE No results for input(s): INR, PTP, APTT, INREXT, INREXT in the last 72 hours. No results for input(s): FE, TIBC, PSAT, FERR in the last 72 hours. No results found for: FOL, RBCF No results for input(s): PH, PCO2, PO2 in the last 72 hours. No results for input(s): CPK, CKNDX, TROIQ in the last 72 hours. No lab exists for component: CPKMB No results found for: CHOL, CHOLX, CHLST, CHOLV, HDL, HDLP, LDL, LDLC, DLDLP, TGLX, TRIGL, TRIGP, CHHD, CHHDX Lab Results Component Value Date/Time Glucose (POC) 192 (H) 11/18/2020 04:20 PM  
 Glucose (POC) 184 (H) 11/18/2020 11:51 AM  
 Glucose (POC) 146 (H) 11/18/2020 07:27 AM  
 Glucose (POC) 151 (H) 11/17/2020 10:04 PM  
 Glucose (POC) 157 (H) 11/17/2020 04:48 PM  
 
Lab Results Component Value Date/Time  Color YELLOW/STRAW 11/12/2020 08:06 PM  
 Appearance CLEAR 11/12/2020 08:06 PM  
 Specific gravity 1.025 11/12/2020 08:06 PM  
 pH (UA) 5.0 11/12/2020 08:06 PM  
 Protein 100 (A) 11/12/2020 08:06 PM  
 Glucose >1,000 (A) 11/12/2020 08:06 PM  
 Ketone 15 (A) 11/12/2020 08:06 PM  
 Bilirubin Negative 11/12/2020 08:06 PM  
 Urobilinogen 0.2 11/12/2020 08:06 PM  
 Nitrites Negative 11/12/2020 08:06 PM  
 Leukocyte Esterase Negative 11/12/2020 08:06 PM  
 Epithelial cells FEW 11/12/2020 08:06 PM  
 Bacteria Negative 11/12/2020 08:06 PM  
 WBC 0-4 11/12/2020 08:06 PM  
 RBC 10-20 11/12/2020 08:06 PM  
 
 
 
Medications Reviewed:  
 
Current Facility-Administered Medications Medication Dose Route Frequency  QUEtiapine (SEROquel) tablet 25 mg  25 mg Oral QHS  furosemide (LASIX) tablet 20 mg  20 mg Oral DAILY  vancomycin (VANCOCIN) 1750 mg in  ml infusion  1,750 mg IntraVENous Q24H  
 metoprolol succinate (TOPROL-XL) XL tablet 25 mg  25 mg Oral DAILY  haloperidol lactate (HALDOL) injection 2 mg  2 mg IntraVENous Q8H PRN  
 enoxaparin (LOVENOX) injection 90 mg  90 mg SubCUTAneous Q12H  
 insulin glargine (LANTUS) injection 15 Units  15 Units SubCUTAneous DAILY  sodium chloride (NS) flush 5-40 mL  5-40 mL IntraVENous Q8H  
 sodium chloride (NS) flush 5-40 mL  5-40 mL IntraVENous PRN  
 acetaminophen (TYLENOL) tablet 650 mg  650 mg Oral Q6H PRN Or  
 acetaminophen (TYLENOL) suppository 650 mg  650 mg Rectal Q6H PRN  polyethylene glycol (MIRALAX) packet 17 g  17 g Oral DAILY PRN  promethazine (PHENERGAN) tablet 12.5 mg  12.5 mg Oral Q6H PRN Or  
 ondansetron (ZOFRAN) injection 4 mg  4 mg IntraVENous Q6H PRN  
 lactobac ac& pc-s.therm-b.anim (SENDY Q/RISAQUAD)  1 Cap Oral DAILY  cefepime (MAXIPIME) 2 g in 0.9% sodium chloride (MBP/ADV) 100 mL MBP  2 g IntraVENous Q12H  
 insulin lispro (HUMALOG) injection   SubCUTAneous AC&HS  
 glucose chewable tablet 16 g  4 Tab Oral PRN  
 glucagon (GLUCAGEN) injection 1 mg  1 mg IntraMUSCular PRN  
 dextrose 10 % infusion 125-250 mL  125-250 mL IntraVENous PRN  Vancomycin Pharmacy Dosing   Other Rx Dosing/Monitoring  insulin lispro (HUMALOG) injection 5 Units  5 Units SubCUTAneous TIDAC  clopidogreL (PLAVIX) tablet 75 mg  75 mg Oral DAILY  sodium chloride (NS) flush 5-10 mL  5-10 mL IntraVENous PRN  
 
______________________________________________________________________ EXPECTED LENGTH OF STAY: 4d 4h 
ACTUAL LENGTH OF STAY:          6 Praveena Slater MD

## 2020-11-18 NOTE — PROGRESS NOTES
Transition of Care Plan: 
 
* RUR 14% * Disposition- [facility] [phone] * Residfent Physical Examination Report on bedside chart for attending to complete * KATARINA is requiring 2 consecutive negative COVID test results * COVID test 1 to be ordered today * Referral placed to [facility] * [facility] transport will be needed Mr. Fior Salmon with [address] completed an onsite assessment of resident today and indicated patient's care needs can be met at the facility. Dr. Osmany Aparicio to complete the resident physical examination report which is on bedside chart. This report along with H&P, current MD and therapy notes need to be faxed to Preethi Sheri Shielaraghav @ [phone]. Mr. Sheri Mi advised patient will need 2 consecutive negative COVID results. Attending to order 1 of 2 COVID tests today. Telephoned Brenna Lema [phone] regarding the above arrangements. Mrs. Velia Vazquez stated he now has a copy of patients Medicare Card and will bring to hospital for medical record. Mr. Velia Vazquez stated we would prefer to use the Hospice contracted with [address] which is [facility] [phone]. Consult placed to Hospice via [employer]. Elsa Hinojosa LCSW, [facility] Complex Care Coordinator/[place] C: [phone]

## 2020-11-18 NOTE — PROGRESS NOTES
ID Progress Note 2020 Subjective:  
 
Emphasis on comfort noted--seems reasonable--will continue with antibiotic therapy for now Objective: Antibiotics: 1. Vancomycin 2. Cefepime Vitals:  
Visit Vitals BP (!) 107/53 (BP 1 Location: Left arm, BP Patient Position: Sitting) Pulse 86 Temp 98.1 °F (36.7 °C) Resp 18 Ht 6' 2\" (1.88 m) Wt 88.7 kg (195 lb 8.8 oz) SpO2 96% BMI 25.11 kg/m² Tmax:  Temp (24hrs), Av.7 °F (36.5 °C), Min:97.1 °F (36.2 °C), Max:98.1 °F (36.7 °C) Exam:  Lungs:  clear to auscultation bilaterally Heart:  regular rate and rhythm Abdomen:  soft, non-tender. Bowel sounds normal. No masses,  no organomegaly Labs:     
Recent Labs 20 
2167 20 
8405 20 
6273 WBC 17.5* 18.6* 19.8* HGB 15.2 14.7 14.4  187 169 BUN  --  33* 40* CREA  --  0.98 1.02  
 
 
Cultures: No results found for: [facility] Lab Results Component Value Date/Time Culture result: No growth (<1,000 CFU/ML) 2020 08:06 PM  
 Culture result: NO GROWTH 5 DAYS 2020 02:28 PM  
 Culture result: NO GROWTH 5 DAYS 2020 04:22 AM  
 
 
Radiology:  
 
Line/Insert Date:        
 
Assessment: 1. Debility/dementia 2. Leukocytosis--improved 3. Tooth pain--improved Objective: 1. Continue current therapy for now--treat until  Carolina Hall MD

## 2020-11-18 NOTE — PROGRESS NOTES
Problem: Diabetes Self-Management Goal: *Disease process and treatment process Description: Define diabetes and identify own type of diabetes; list 3 options for treating diabetes. Outcome: Progressing Towards Goal 
Goal: *Incorporating nutritional management into lifestyle Description: Describe effect of type, amount and timing of food on blood glucose; list 3 methods for planning meals. Outcome: Progressing Towards Goal 
Goal: *Incorporating physical activity into lifestyle Description: State effect of exercise on blood glucose levels. Outcome: Progressing Towards Goal 
Goal: *Developing strategies to promote health/change behavior Description: Define the ABC's of diabetes; identify appropriate screenings, schedule and personal plan for screenings. Outcome: Progressing Towards Goal 
Goal: *Using medications safely Description: State effect of diabetes medications on diabetes; name diabetes medication taking, action and side effects. Outcome: Progressing Towards Goal 
Goal: *Monitoring blood glucose, interpreting and using results Description: Identify recommended blood glucose targets  and personal targets. Outcome: Progressing Towards Goal 
Goal: *Prevention, detection, treatment of acute complications Description: List symptoms of hyper- and hypoglycemia; describe how to treat low blood sugar and actions for lowering  high blood glucose level. Outcome: Progressing Towards Goal 
Goal: *Prevention, detection and treatment of chronic complications Description: Define the natural course of diabetes and describe the relationship of blood glucose levels to long term complications of diabetes. Outcome: Progressing Towards Goal 
Goal: *Developing strategies to address psychosocial issues Description: Describe feelings about living with diabetes; identify support needed and support network Outcome: Progressing Towards Goal 
  
Problem: Pressure Injury - Risk of 
Goal: *Prevention of pressure injury Description: Document Eric Scale and appropriate interventions in the flowsheet. Outcome: Progressing Towards Goal 
Note: Pressure Injury Interventions: 
Sensory Interventions: Assess changes in LOC, Float heels, Check visual cues for pain, Avoid rigorous massage over bony prominences, Keep linens dry and wrinkle-free, Minimize linen layers Moisture Interventions: Check for incontinence Q2 hours and as needed, Maintain skin hydration (lotion/cream), Apply protective barrier, creams and emollients, Absorbent underpads Activity Interventions: Increase time out of bed, PT/OT evaluation Mobility Interventions: PT/OT evaluation, HOB 30 degrees or less, Pressure redistribution bed/mattress (bed type) Nutrition Interventions: Document food/fluid/supplement intake Friction and Shear Interventions: HOB 30 degrees or less, Lift sheet, Minimize layers, Apply protective barrier, creams and emollients Problem: Patient Education: Go to Patient Education Activity Goal: Patient/Family Education Outcome: Progressing Towards Goal 
  
Problem: Falls - Risk of 
Goal: *Absence of Falls Description: Document Martínez Perdomo Fall Risk and appropriate interventions in the flowsheet. Outcome: Progressing Towards Goal 
Note: Fall Risk Interventions: 
Mobility Interventions: Bed/chair exit alarm, Communicate number of staff needed for ambulation/transfer, Patient to call before getting OOB, Utilize walker, cane, or other assistive device Mentation Interventions: Adequate sleep, hydration, pain control, Bed/chair exit alarm, Door open when patient unattended, Gait belt with transfers/ambulation, Reorient patient, More frequent rounding Medication Interventions: Patient to call before getting OOB, Teach patient to arise slowly, Evaluate medications/consider consulting pharmacy, Utilize gait belt for transfers/ambulation Elimination Interventions: Call light in reach, Toilet paper/wipes in reach, Patient to call for help with toileting needs, Toileting schedule/hourly rounds, Urinal in reach History of Falls Interventions: Investigate reason for fall Problem: Patient Education: Go to Patient Education Activity Goal: Patient/Family Education Outcome: Progressing Towards Goal 
  
Problem: Discharge Planning Goal: *Discharge to safe environment Outcome: Progressing Towards Goal 
  
Problem: Patient Education: Go to Patient Education Activity Goal: Patient/Family Education Outcome: Progressing Towards Goal 
  
Problem: Patient Education: Go to Patient Education Activity Goal: Patient/Family Education Outcome: Progressing Towards Goal

## 2020-11-18 NOTE — DIABETES MGMT
[address] [facility] PROGRAM FOR DIABETES HEALTH 
 
FOLLOW UP NOTE Presentation Krystin Wilson is a 80 y.o. male who presented to the ED after being found down at his independent living facility. He was noted to have an elevated troponin, abnormal EKG, elevated lactate with MAN and admitted for medical management. HX: CVA with residual left-sided hemiparesis, Type 2 Diabetes, Dementia DX: NSTEMI, AMS, Sepsis, Rhabdomyolysis TX: Cardiology consult, ECHO/CT, blood cultures with IV abox, IV resucitation Current clinical course has been uncomplicated. Diabetes: Patient has known Type two diabetes, treated with metformin PTA. Family history unknown for diabetes. Consulted by Provider for advanced diabetes nursing assessment and care, specifically related to  
  
[x] Inpatient management strategy [x] Home management assessment Diabetes-related medical history Acute complications: Hyperglycemia Neurological complications Peripheral neuropathy Microvascular disease: None Macrovascular disease Cerebral vascular accident Other associated conditions: dementia and paranoia Diabetes medication history Drug class Currently in use Discontinued Never used Biguanide Metformin 1000 mg BID    
DDP-4 inhibitor Sulfonylurea Glipizide 5mg Daily Thiazolidinedione GLP-1 RA SGLT-2 inhibitors Basal insulin Fixed Dose  Combinations Bolus insulin Subjective Patient remains confused per notes. Palliative involved - will focus on comfort care /hospice when patient discharged. DNR status noted. Patient resides in independent living at [address] [place] [address] His nephew is Yulia Robert- his next of KIN/emergency contact. and resides in [place]. Wife has passed away and does not have children Nephew reports that he was moved to [facility] in September He did have a caregiver in [place] who assisted with transportation to doctor visits/ADLs. She may have a list of his medications but otherwise, he will be trying to get in touch with his PCP in [place]. He couldn't remember who his PCP was but is looking through records at home. Per nephew, he had an abscessed tooth 2-3 weeks ago, was given a prescription for an antibiotic but nephew is unclear if he filled and took Rx. Patient unable to report home diabetes management practices. Metformin and Glipizide bottles at bedside: 
Last PCP was Nisreen Zavala MD 
[address] [facility], [address] [facility] 
[phone] Current inpatient information ECHO-EF 15% Heparin gtt CT infection r/o, IV abox and blood cx MAN: GFR improving, now WNL A1C 8.2% Glucose 342 Leukocytosis (EBC 24.8) Small ketones in urine, 1000+ glucose in urine Initial anion gap 14, now 12 Lactate 3.6 Objective Physical exam 
General Confused, awake Vital Signs Visit Vitals BP (!) 97/58 (BP 1 Location: Left arm, BP Patient Position: At rest) Pulse 94 Temp 97.7 °F (36.5 °C) Resp 22 Ht 6' 2\" (1.88 m) Wt 88.7 kg (195 lb 8.8 oz) SpO2 96% BMI 25.11 kg/m² Skin  Warm and dry. Acanthosis noted along neckline. Heart   Regular rate and rhythm. No murmurs, rubs or gallops Lungs  Clear to auscultation without rales or rhonchi Extremities No foot wounds Laboratory Lab Results Component Value Date/Time Hemoglobin A1c 8.2 (H) 11/13/2020 04:22 AM  
 
No results found for: LDL, LDLC, DLDLP Lab Results Component Value Date/Time Creatinine 0.98 11/17/2020 03:18 AM  
 
Lab Results Component Value Date/Time  Sodium 138 11/17/2020 03:18 AM  
 Potassium 3.9 11/17/2020 03:18 AM  
 Chloride 109 (H) 11/17/2020 03:18 AM  
 CO2 25 11/17/2020 03:18 AM  
 Anion gap 4 (L) 11/17/2020 03:18 AM  
 Glucose 88 11/17/2020 03:18 AM  
 BUN 33 (H) 11/17/2020 03:18 AM  
 Creatinine 0.98 11/17/2020 03:18 AM  
 BUN/Creatinine ratio 34 (H) 11/17/2020 03:18 AM  
 GFR est AA >60 11/17/2020 03:18 AM  
 GFR est non-AA >60 11/17/2020 03:18 AM  
 Calcium 9.3 11/17/2020 03:18 AM  
 Bilirubin, total 1.3 (H) 11/13/2020 09:00 AM  
 Alk. phosphatase 75 11/13/2020 09:00 AM  
 Protein, total 7.0 11/13/2020 09:00 AM  
 Albumin 3.1 (L) 11/13/2020 09:00 AM  
 Globulin 3.9 11/13/2020 09:00 AM  
 A-G Ratio 0.8 (L) 11/13/2020 09:00 AM  
 ALT (SGPT) 54 11/13/2020 09:00 AM  
 
Lab Results Component Value Date/Time ALT (SGPT) 54 11/13/2020 09:00 AM  
 
 
Factors affecting BG pattern Factor Dose Comments Nutrition: 
Carb-controlled meals 60 grams/meal PO intake 15% Lactate 3.6-->2.3 (11/14/2020) Infection Leukocytosis improving on IV antibiotics Other: MAN GFR WNL ,improvement with hydration Blood glucose pattern Assessment and Plan Nursing Diagnosis Risk for unstable blood glucose pattern Nursing Intervention Domain 5250 Decision-making Support Nursing Interventions Examined current inpatient diabetes control Explored factors facilitating and impeding inpatient management Identified self-management practices impeding diabetes control Explored corrective strategies with patient and responsible inpatient provider Informed patient of rational for insulin strategy while hospitalized Evaluation Julious Font \"Slava\" Rosana Hart is an 80year old gentleman with type two diabetes on metformin admitted after being found down in his assisted living facility and found to have hyperglycemia, rhabdomyelitis, MAN, abnormal EKG with sepsis with lactic acidosis. Cardiology consulted and IV antibiotics with fluid resuscitation initiated. At this time A1C 8.2%, likely goal for age and mental status. At this time, glucose elevated and reasonable to start low dose basal insulin. Inpatient glucose goal closer to 180. BG trends remain stable and <180mg/dl consistently without evidence of hypoglycemia. Recommendations Recommend 1. CONTINUE basal insulin 15units daily 2. Correctional insulin ACHS at normal sensitivity, start at a glucose of 200 
 
3. CONTINUE  pre-meal insulin- 5 units TID Humalog before meals (HOLD for reduced PO intake <50%) Discharge Planning 1. CM working SNF vs. hospice placement 2. Since A1C 8.2% and advanced age, would recommend re-starting oral diabetic medications per PTA medication list.  OR if focus is on comfort care, discontinue Glipizide since PO intake erratic/marginal. 
Billing Code(s) I personally reviewed chart, notes, data and current medications in the medical record, and examined the patient at bedside before making care recommendations. Thank you for including us in their care. I spent 15 minutes in direct patient care today for this patient. Time includes chart review, face to face with patient and collaboration with interdisciplinary care team. 
  
 
Sandy Mayen, CNS Program for Diabetes Health Access via [address] [place] [address] [phone]

## 2020-11-19 NOTE — PROGRESS NOTES
Lovenox Monitoring Indication: ACS Recent Labs 11/18/20 
0245 11/17/20 
5932 HGB 15.2 14.7  187 CREA  --  0.98 Current Weight: 103.7 kg  
Est. CrCl =66 ml/min Current Dose: 90 mg subcutaneously every 12 hours.  
Plan: Change to 100 mh Q12hr   - new weight confirmed with RN

## 2020-11-19 NOTE — PROGRESS NOTES
Problem: Diabetes Self-Management Goal: *Disease process and treatment process Description: Define diabetes and identify own type of diabetes; list 3 options for treating diabetes. Outcome: Progressing Towards Goal 
Goal: *Incorporating nutritional management into lifestyle Description: Describe effect of type, amount and timing of food on blood glucose; list 3 methods for planning meals. Outcome: Progressing Towards Goal 
Goal: *Incorporating physical activity into lifestyle Description: State effect of exercise on blood glucose levels. Outcome: Progressing Towards Goal 
Goal: *Developing strategies to promote health/change behavior Description: Define the ABC's of diabetes; identify appropriate screenings, schedule and personal plan for screenings. Outcome: Progressing Towards Goal 
Goal: *Using medications safely Description: State effect of diabetes medications on diabetes; name diabetes medication taking, action and side effects. Outcome: Progressing Towards Goal 
Goal: *Monitoring blood glucose, interpreting and using results Description: Identify recommended blood glucose targets  and personal targets. Outcome: Progressing Towards Goal 
Goal: *Prevention, detection and treatment of chronic complications Description: Define the natural course of diabetes and describe the relationship of blood glucose levels to long term complications of diabetes. Outcome: Progressing Towards Goal 
Goal: *Developing strategies to address psychosocial issues Description: Describe feelings about living with diabetes; identify support needed and support network Outcome: Progressing Towards Goal 
Goal: *Insulin pump training Outcome: Progressing Towards Goal 
Goal: *Sick day guidelines Outcome: Progressing Towards Goal 
Goal: *Patient Specific Goal (EDIT GOAL, INSERT TEXT) Outcome: Progressing Towards Goal 
  
Problem: Patient Education: Go to Patient Education Activity Goal: Patient/Family Education Outcome: Progressing Towards Goal 
  
Problem: Pressure Injury - Risk of 
Goal: *Prevention of pressure injury Description: Document Eric Scale and appropriate interventions in the flowsheet. Outcome: Progressing Towards Goal 
Note: Pressure Injury Interventions: 
Sensory Interventions: Assess changes in LOC Moisture Interventions: Apply protective barrier, creams and emollients, Check for incontinence Q2 hours and as needed Activity Interventions: Chair cushion, Increase time out of bed, Pressure redistribution bed/mattress(bed type) Mobility Interventions: Chair cushion, Pressure redistribution bed/mattress (bed type) Nutrition Interventions: Document food/fluid/supplement intake Friction and Shear Interventions: Apply protective barrier, creams and emollients, Lift team/patient mobility team, Minimize layers

## 2020-11-19 NOTE — PROGRESS NOTES
1930 
Bedside shift change report given to Paul Alvarado (oncoming nurse) by Luma (offgoing nurse). Report included the following information SBAR, Kardex, Intake/Output, MAR, Accordion, Recent Results and Cardiac Rhythm A fib w/ BBB. [facility] Bedside shift change report given to Mechelle (oncoming nurse) by Paul Alvarado (offgoing nurse). Report included the following information SBAR, Kardex, Intake/Output, MAR, Accordion, Recent Results and Cardiac Rhythm A fib w/ BBB.

## 2020-11-19 NOTE — ROUTINE PROCESS
Bedside shift change report given to [facility] (oncoming nurse) by Ghada Martínez (offgoing nurse). Report included the following information SBAR, ED Summary, MAR, Recent Results and Cardiac Rhythm Afib BBB.

## 2020-11-19 NOTE — PROGRESS NOTES
Cardiology Consult/Progress Note 11/12/2020  2:06 PM  
 
Subjective: 
Status unchanged Continue supportive care. Plavix, lovenox. Poor OAC candidate but recommended for LV apical thrombus temporarily. If going to NH/rehab would continue anticoagulation Lovenox is fine for now. Assessment and PLAN 1. Acute Rhabdomyolysis 
 - found down, suspect longer than 24 hours - CK 1347, CKMB 110, index 8.2 
 -  IVF therapy 2. NSTEMI 
 - TI suspect he has a high burden of obstructive severe coronary  
    artery disease. Troponin release may be demand related 
 - EKG with Afib RVR, PVC and underlying conduction delay with bifascicular block  
 -Start atorvastatin once rhabdomyolysis completely resolved. -  ST changes could represent secondary repolarization changes from intraventricular conduction delay/bifascicular block 
 - Echo demonstrates severe LV dysfunction, severe apical hypokinesis with with LV apical thrombus. 11/12/20 ECHO ADULT COMPLETE 11/13/2020 11/13/2020 Narrative · Contrast used: DEFINITY. · LV: Estimated LVEF is <15%. Mildly dilated left ventricle. Decreased  
wall thickness. Severely and segmentally reduced systolic function. Moderate (grade 2) left ventricular diastolic dysfunction. · MV: Mitral valve non-specific thickening. Mild mitral valve  
regurgitation is present. · Large apical thrombus measuring 8 by 12 mm in size. Associated with  
apical severe hypokinesis. · Finding consistent with severe ischemic cardiomyopathy vs at a lesser  
likelihood of stress cardiomyopathy Signed by: Jerri Estrada MD  
3. Suspected sepsis in conjunction with severe LV systolic dysfunction - LAC 3.6 
 - WBC 18.6 
 - pan Cx 4. Afib 
 - Rate controlled. Dd low dose BB 
 - Not a candidate for long-term [address]. - Switch from Lovenox to Eliquis 5 mg BID for 2 more months.  Plavix monotherapy after that if no falls. If still at high risk of falls, then no further therapy. if he goes to a nursing home where he can be monitored closely. 5. MAN 
 - suspect from dehydration - resolved - IVF 6. DM II 
 - A1c 8.2 
 - SSI per Primary team 
7. Dementia 
 - seems baseline 8. H/o CVA 
 - left sided affect 
 - on Plavix 9. HFrEF:  
 - Initiated Low dose BB  
 - BP too low at this time to initiate Lisinopril. Recommend starting Low dose Lisinopril when BP tolerates. -Consider maintenance 20 mg daily lasix at discharge He is cleared from cardiology standpoint for discharge. Given his current diagnoses with end-stage heart failure, significant coronary artery disease as well as moderate to severe dementia it is recommended to continue only medical management at this time. He needs no further cardiac testing or intervention, recommend discharging on current meds. We will see again as needed. [x]    High complexity decision making was performed 
[x]    Patient is at high-risk of decompensation with multiple organ involvement Referring physician Dr. Fannie Pena,: 
HPI: Lucy Chin is a 80 y.o. male who was brought to [facility] emergency room after being found down. Dhruv was called by his neighbors who hold noises from his house overnight and earlier this morning. When the dhruv reached the site patient was found on the floor leaning his head on the wall. He was not unconscious. He said that he was on the floor overnight and was unable to move. He has prior history of stroke with residual left hemiparesis although has been living in independent living. He denies any symptoms of syncope, chest discomfort, significant shortness of breath. Dhruv performed an EKG on the way and were concerned about possible ST elevation myocardial infarction. Hence cardiology was consulted. Investigation ECG: ECG shows atrial fibrillation with rapid ventricular rate, PVCs, underlying intraventricular conduction delay with bifascicular block pattern. There are ST segment changes which may suggest ischemia/infarction. However in the absence of any active chest discomfort I highly doubt that these reflect true MI despite ST segment being concordant in lead V2 and V3. ST segment changes in V4 are discordant and less than 5 mm. There are no obvious reciprocal changes noted. Echocardiogram: Not performed White cell count 22.8 Troponin 3 PM: 54 Total CK 1365; CK-; CK-MB index 8.2 Review of Symptoms: 
Could not be performed because of patient being unable to answer all the questions. Patient Active Problem List  
 Diagnosis Date Noted  NSTEMI (non-ST elevated myocardial infarction) ([address] 11/12/2020 UNKNOWN 
No past medical history on file. No past surgical history on file. Social History Socioeconomic History  Marital status:  Spouse name: Not on file  Number of children: Not on file  Years of education: Not on file  Highest education level: Not on file No family history on file. Current Facility-Administered Medications Medication Dose Route Frequency  digoxin (LANOXIN) injection 125 mcg  125 mcg IntraVENous DAILY PRN  
 QUEtiapine (SEROquel) tablet 25 mg  25 mg Oral QHS  furosemide (LASIX) tablet 20 mg  20 mg Oral DAILY  vancomycin (VANCOCIN) 1750 mg in  ml infusion  1,750 mg IntraVENous Q24H  
 metoprolol succinate (TOPROL-XL) XL tablet 25 mg  25 mg Oral DAILY  haloperidol lactate (HALDOL) injection 2 mg  2 mg IntraVENous Q8H PRN  
 enoxaparin (LOVENOX) injection 90 mg  90 mg SubCUTAneous Q12H  
 insulin glargine (LANTUS) injection 15 Units  15 Units SubCUTAneous DAILY  sodium chloride (NS) flush 5-40 mL  5-40 mL IntraVENous Q8H  
 sodium chloride (NS) flush 5-40 mL  5-40 mL IntraVENous PRN  
 acetaminophen (TYLENOL) tablet 650 mg  650 mg Oral Q6H PRN  Or  
  acetaminophen (TYLENOL) suppository 650 mg  650 mg Rectal Q6H PRN  polyethylene glycol (MIRALAX) packet 17 g  17 g Oral DAILY PRN  promethazine (PHENERGAN) tablet 12.5 mg  12.5 mg Oral Q6H PRN Or  
 ondansetron (ZOFRAN) injection 4 mg  4 mg IntraVENous Q6H PRN  
 lactobac ac& pc-s.therm-b.anim (SENDY Q/RISAQUAD)  1 Cap Oral DAILY  cefepime (MAXIPIME) 2 g in 0.9% sodium chloride (MBP/ADV) 100 mL MBP  2 g IntraVENous Q12H  
 insulin lispro (HUMALOG) injection   SubCUTAneous AC&HS  
 glucose chewable tablet 16 g  4 Tab Oral PRN  
 glucagon (GLUCAGEN) injection 1 mg  1 mg IntraMUSCular PRN  
 dextrose 10 % infusion 125-250 mL  125-250 mL IntraVENous PRN  Vancomycin Pharmacy Dosing   Other Rx Dosing/Monitoring  insulin lispro (HUMALOG) injection 5 Units  5 Units SubCUTAneous TIDAC  clopidogreL (PLAVIX) tablet 75 mg  75 mg Oral DAILY  sodium chloride (NS) flush 5-10 mL  5-10 mL IntraVENous PRN None No Known Allergies Labs:  
Recent Results (from the past 24 hour(s)) GLUCOSE, POC Collection Time: 11/18/20 11:51 AM  
Result Value Ref Range Glucose (POC) 184 (H) 65 - 100 mg/dL Performed by Maria Teresa GALVEZ) GLUCOSE, POC Collection Time: 11/18/20  4:20 PM  
Result Value Ref Range Glucose (POC) 192 (H) 65 - 100 mg/dL Performed by Skyla Erickson SARS-COV-2 Collection Time: 11/18/20  7:02 PM  
Result Value Ref Range Specimen source Nasopharyngeal    
 SARS-CoV-2 PENDING   
 SARS-CoV-2 PENDING Specimen source Nasopharyngeal    
 COVID-19 rapid test PENDING Specimen type NP Swab Health status PENDING   
 COVID-19 PENDING   
GLUCOSE, POC Collection Time: 11/18/20  8:44 PM  
Result Value Ref Range Glucose (POC) 132 (H) 65 - 100 mg/dL Performed by Violette Julian GLUCOSE, POC Collection Time: 11/19/20  7:35 AM  
Result Value Ref Range Glucose (POC) 146 (H) 65 - 100 mg/dL Performed by Vioeltte Julian Intake/Output Summary (Last 24 hours) at 11/19/2020 1119 Last data filed at 11/19/2020 5162 Gross per 24 hour Intake 360 ml Output 0 ml Net 360 ml Patient Vitals for the past 24 hrs: 
 Temp Pulse Resp BP SpO2  
11/19/20 0832  92  108/71   
11/19/20 0800 97 °F (36.1 °C) 93 19 108/71 95 % 11/19/20 0429 97.7 °F (36.5 °C) 92 22 106/62 91 % 11/19/20 0025 96.9 °F (36.1 °C) 89 24 110/65 96 % 11/18/20 2055 99.3 °F (37.4 °C) 89 20 (!) 125/58 92 % 11/18/20 1504 98.1 °F (36.7 °C) 86 18 (!) 107/53 96 % General:    Not clearly oriented  Alert this am.  
Psychiatric:    Unable to assess Eye/ENT:      Pupils equal, No asymmetry, Conjunctival pink. Able to hear voice at normal amplitude. Evidence of trauma to the scalp Lungs:      Visibly symmetric chest expansion, No palpable tenderness. Clear to auscultation bilaterally. Heart[de-identified]     Variable S1 and S2, irregular rate. Wide split second heart sound. systolic murmur, no click, rub or gallop. No JVD, Normal palpable peripheral pulses. No cyanosis Abdomen:     Soft, non-tender. Bowel sounds normal. No masses,  No   
  organomegaly. Extremities:   Extremities normal, atraumatic, no edema. Neurologic:   Left upper and lower extremity weakness MD Alexx Michael MD 
 
11/19/2020  
9:10 AM 
  
 
[facility] Office:   [address] [facility] Office: 
[address]     [address] [facility] [address]     [address] P: [id number]    P: [phone] F: [phone]    F: [phone]

## 2020-11-19 NOTE — PROGRESS NOTES
Day # 7  of cefepime Indication:  sepsis Current regimen:  2 grams Q 12hr Abx regimen: vancomycin +cefepime Recent Labs 20 
4240 20 
8957 WBC 17.5* 18.6* CREA  --  0.98  
BUN  --  33* Est CrCl:>60 ml/min; Temp (24hrs), Av.8 °F (36.6 °C), Min:96.9 °F (36.1 °C), Max:99.3 °F (37.4 °C) Plan: Change to 2 grams Q 8hr per renal dosing protocol

## 2020-11-19 NOTE — PROGRESS NOTES
NUTRITION Reason for Rescreen: LOS       
RECOMMENDATIONS:  
1. Provide prompting and assistance with all meals 2. Use Glucerna to give meds Interventions  
- added supplements/snacks: Glucerna BID 
- diet order adjusted to: finger foods, NCS Information obtained from:   Chart review, RN. Pt admitted for NSTEMI. PMHx: dementia, DM, CAD, afib, CVA. NSTEMI on admit with rhabdomyolysis, improving. Plans for d/c to SNF pending COVID tests. Referral to Hospice for visit after discharge also noted. Pt sleeping at time of visit. Intake improving slightly over past week. Spoke with RN who reports pt ate 50% sandwich and 50% of soup at lunch. Does better with foods he can  - diet order adjusted to finger foods. Will also add Glucerna BID (220kcal, 20g protein). With advanced age and dementia would not recommend aggressive nutrition intervention if oral intake inadequate. Continue to provide prompting and assistance with all meals. Diet:  consistent CHO Supplements: None Meal intake:  
Patient Vitals for the past 168 hrs: 
 % Diet Eaten 11/19/20 1533 50 % 11/18/20 1914 75 % 11/18/20 1103 90 % 11/18/20 0857 30 % 11/17/20 1251 75 % 11/17/20 0805 25 % 11/16/20 1309 25 % 11/16/20 0824 50 % 11/15/20 1810 15 % 11/15/20 1247 15 % 11/15/20 0804 10 % 11/14/20 1838 15 % 11/14/20 1502 25 % Weight Changes:  
Stable Wt Readings from Last 10 Encounters:  
11/19/20 103.7 kg (228 lb 11.2 oz)  
11/13/20 94.3 kg (207 lb 14.3 oz) Nutrition-Related Concerns Identified: 
Baseline dementia Estimated Nutrition Needs:  
Energy: 3027-0707([id number])  Wt used: Current(104kg) Protein: 103 - 1g/kg  Wt used: Current(103kg) Fluid: 2300 PLAN:  
- Follow wt trends and - Check for acceptance of supplements Will revisit per policy Brook Hernandez RD

## 2020-11-19 NOTE — PROGRESS NOTES
Bedside and Verbal shift change report given to [facility] (oncoming nurse) by Charlotte Carrasco (offgoing nurse). Report included the following information SBAR, Kardex, Procedure Summary, Intake/Output, MAR, Recent Results and Cardiac Rhythm AFIB. Bedside and Verbal shift change report given to [address] [place] [address] (oncoming nurse) by Yesy Ng RN (offgoing nurse). Report included the following information SBAR, Kardex, Procedure Summary, Intake/Output, MAR, Recent Results and Cardiac Rhythm AFIB.

## 2020-11-19 NOTE — PROGRESS NOTES
Spiritual Care Assessment/Progress Note [address] 
 
 
NAME: Ricardo Chacon      MRN: [id number] AGE: 80 y.o. SEX: male Muslim Affiliation: No preference Language: English  
 
11/19/2020     Total Time (in minutes): 10 Spiritual Assessment begun in [facility] 4 CV SERVICES UNIT through conversation with: 
  
    []Patient        [] Family    [] Friend(s) Reason for Consult: Rapid response team  
 
Spiritual beliefs: (Please include comment if needed) 
   [] Identifies with a tiarra tradition:     
   [] Supported by a tiarra community:        
   [] Claims no spiritual orientation:       
   [] Seeking spiritual identity:            
   [] Adheres to an individual form of spirituality:       
   [x] Not able to assess:                   
 
    
Identified resources for coping:  
   [] Prayer                           
   [] Music                  [] Guided Imagery 
   [] Family/friends                 [] Pet visits [] Devotional reading                         [] Unknown 
   [] Other:                                          
 
 
Interventions offered during this visit: (See comments for more details) Patient Interventions: Crisis Plan of Care: 
 
 [] Support spiritual and/or cultural needs  
 [] Support AMD and/or advance care planning process    
 [] Support grieving process 
 [] Coordinate Rites and/or Rituals  
 [] Coordination with community clergy [] No spiritual needs identified at this time 
 [] Detailed Plan of Care below (See Comments)  [] Make referral to Music Therapy 
[] Make referral to Pet Therapy    
[] Make referral to Addiction services 
[] Make referral to [facility] 
[] Make referral to Spiritual Care Partner 
[] No future visits requested       
[] Follow up visits as needed Comments: Responded to RRT called for Mr Edwina Escoto in room 463. Multiple staff members were attending to patient and no family was present at that time. Please notify  if support needed/desired. : Rev. Piper Collins. Henrietta Denton; [facility], to contact [phone] [address] call: [phone]

## 2020-11-19 NOTE — DIABETES MGMT
[address] [facility] PROGRAM FOR DIABETES HEALTH 
 
FOLLOW UP NOTE Presentation Kia De Paz is a 80 y.o. male who presented to the ED after being found down at his independent living facility. He was noted to have an elevated troponin, abnormal EKG, elevated lactate with MAN and admitted for medical management. HX: CVA with residual left-sided hemiparesis, Type 2 Diabetes, Dementia DX: NSTEMI, AMS, Sepsis, Rhabdomyolysis TX: Cardiology consult, ECHO/CT, blood cultures with IV abox, IV resucitation Current clinical course has been uncomplicated. Diabetes: Patient has known Type two diabetes, treated with metformin PTA. Family history unknown for diabetes. Consulted by Provider for advanced diabetes nursing assessment and care, specifically related to  
  
[x] Inpatient management strategy [x] Home management assessment Diabetes-related medical history Acute complications: Hyperglycemia Neurological complications Peripheral neuropathy Microvascular disease: None Macrovascular disease Cerebral vascular accident Other associated conditions: dementia and paranoia Diabetes medication history Drug class Currently in use Discontinued Never used Biguanide Metformin 1000 mg BID    
DDP-4 inhibitor Sulfonylurea Glipizide 5mg Daily Thiazolidinedione GLP-1 RA SGLT-2 inhibitors Basal insulin Fixed Dose  Combinations Bolus insulin Subjective Patient remains confused per notes. Palliative involved - will focus on comfort care /hospice when patient discharged. DNR status noted. Awaiting placement to SNF. Patient resides in independent living at [facility] His nephew is Matthew Hughes- his next of KIN/emergency contact. and resides in [place]. Wife has passed away and does not have children Nephew reports that he was moved to [place] in September He did have a caregiver in [place] who assisted with transportation to doctor visits/ADLs. She may have a list of his medications but otherwise, he will be trying to get in touch with his PCP in [place]. He couldn't remember who his PCP was but is looking through records at home. Per nephew, he had an abscessed tooth 2-3 weeks ago, was given a prescription for an antibiotic but nephew is unclear if he filled and took Rx. Patient unable to report home diabetes management practices. Metformin and Glipizide bottles at bedside: 
Last PCP was Sandro Salcido MD 
[address] [facility] [address] [facility] 
[phone] Current inpatient information ECHO-EF 15% Heparin gtt CT infection r/o, IV abox and blood cx MAN: GFR improving, now WNL A1C 8.2% Glucose 342 Leukocytosis (EBC 24.8) Small ketones in urine, 1000+ glucose in urine Initial anion gap 14, now 12 Lactate 3.6 Objective Physical exam 
General Confused, awake Vital Signs Visit Vitals /71 Pulse 92 Temp 97 °F (36.1 °C) Resp 19 Ht 6' 2\" (1.88 m) Wt 104.1 kg (229 lb 8 oz) SpO2 95% BMI 29.47 kg/m² Skin  Warm and dry. Acanthosis noted along neckline. Heart   Regular rate and rhythm. No murmurs, rubs or gallops Lungs  Clear to auscultation without rales or rhonchi Extremities No foot wounds Laboratory Lab Results Component Value Date/Time Hemoglobin A1c 8.2 (H) 11/13/2020 04:22 AM  
 
No results found for: LDL, LDLC, DLDLP Lab Results Component Value Date/Time Creatinine 0.98 11/17/2020 03:18 AM  
 
Lab Results Component Value Date/Time  Sodium 138 11/17/2020 03:18 AM  
 Potassium 3.9 11/17/2020 03:18 AM  
 Chloride 109 (H) 11/17/2020 03:18 AM  
 CO2 25 11/17/2020 03:18 AM  
 Anion gap 4 (L) 11/17/2020 03:18 AM  
 Glucose 88 11/17/2020 03:18 AM  
 BUN 33 (H) 11/17/2020 03:18 AM  
 Creatinine 0.98 11/17/2020 03:18 AM  
 BUN/Creatinine ratio 34 (H) 11/17/2020 03:18 AM  
 GFR est AA >60 11/17/2020 03:18 AM  
 GFR est non-AA >60 11/17/2020 03:18 AM  
 Calcium 9.3 11/17/2020 03:18 AM  
 Bilirubin, total 1.3 (H) 11/13/2020 09:00 AM  
 Alk. phosphatase 75 11/13/2020 09:00 AM  
 Protein, total 7.0 11/13/2020 09:00 AM  
 Albumin 3.1 (L) 11/13/2020 09:00 AM  
 Globulin 3.9 11/13/2020 09:00 AM  
 A-G Ratio 0.8 (L) 11/13/2020 09:00 AM  
 ALT (SGPT) 54 11/13/2020 09:00 AM  
 
Lab Results Component Value Date/Time ALT (SGPT) 54 11/13/2020 09:00 AM  
 
 
Factors affecting BG pattern Factor Dose Comments Nutrition: 
Carb-controlled meals 60 grams/meal PO intake 15% Lactate 3.6-->2.3 (11/14/2020) Infection Leukocytosis improving on IV antibiotics Other: MAN GFR WNL ,improvement with hydration Blood glucose pattern Assessment and Plan Nursing Diagnosis Risk for unstable blood glucose pattern Nursing Intervention Domain 5259 Decision-making Support Nursing Interventions Examined current inpatient diabetes control Explored factors facilitating and impeding inpatient management Identified self-management practices impeding diabetes control Explored corrective strategies with patient and responsible inpatient provider Informed patient of rational for insulin strategy while hospitalized Evaluation Keena Chris \"Slava\" Kika Menezes is an 80year old gentleman with type two diabetes on metformin admitted after being found down in his assisted living facility and found to have hyperglycemia, rhabdomyelitis, MAN, abnormal EKG with sepsis with lactic acidosis. Cardiology consulted and IV antibiotics with fluid resuscitation initiated. At this time A1C 8.2%, likely goal for age and mental status. At this time, glucose elevated and reasonable to start low dose basal insulin. Inpatient glucose goal closer to 180. BG trends remain stable and <180mg/dl consistently without evidence of hypoglycemia. Consumed 75% of his supper yesterday. Recommendations Recommend 1. CONTINUE basal insulin 15units daily 2. Correctional insulin ACHS at normal sensitivity, start at a glucose of 200 
 
3. CONTINUE  pre-meal insulin- 5 units TID Humalog before meals (HOLD for reduced PO intake <50%) Discharge Planning 1. CM working SNF vs. hospice placement 2. Since A1C 8.2% and advanced age, would recommend re-starting oral diabetic medications per PTA medication list.  OR if focus is on comfort care, discontinue Glipizide since PO intake erratic/marginal. 
Billing Code(s) I personally reviewed chart, notes, data and current medications in the medical record, and examined the patient at bedside before making care recommendations. Thank you for including us in their care. I spent 15 minutes in direct patient care today for this patient. Time includes chart review, face to face with patient and collaboration with interdisciplinary care team. 
  
 
Vidal Aguayo, CNS Program for Diabetes Health Access via [address] [place] [address] [phone]

## 2020-11-19 NOTE — PROGRESS NOTES
ID Progress Note 2020 Subjective:  
 
Emphasis on comfort noted--seems reasonable--will continue with antibiotic therapy for now Objective: Antibiotics: 1. Vancomycin 2. Cefepime Vitals:  
Visit Vitals /64 (BP 1 Location: Left arm, BP Patient Position: At rest) Pulse 93 Temp 98 °F (36.7 °C) Resp 22 Ht 6' 2\" (1.88 m) Wt 103.7 kg (228 lb 11.2 oz) SpO2 95% BMI 29.36 kg/m² Tmax:  Temp (24hrs), Av.6 °F (36.4 °C), Min:96.9 °F (36.1 °C), Max:99.3 °F (37.4 °C) Exam:  Lungs:  clear to auscultation bilaterally Heart:  regular rate and rhythm Abdomen:  soft, non-tender. Bowel sounds normal. No masses,  no organomegaly Labs:     
Recent Labs 20 
0820 20 
2491 WBC 17.5* 18.6* HGB 15.2 14.7  187 BUN  --  33* CREA  --  0.98 Cultures: No results found for: [facility] Lab Results Component Value Date/Time Culture result: No growth (<1,000 CFU/ML) 2020 08:06 PM  
 Culture result: NO GROWTH 5 DAYS 2020 02:28 PM  
 Culture result: NO GROWTH 5 DAYS 2020 04:22 AM  
 
 
Radiology:  
 
Line/Insert Date:        
 
Assessment: 1. Debility/dementia 2. Leukocytosis--improved 3. Tooth pain--improved Objective: 1. Continue current therapy for now--treat until  Abraham Leiva MD

## 2020-11-20 NOTE — PROGRESS NOTES
TRANSFER - IN REPORT: 
 
Verbal report received from MARVIN Yanez(name) on Jake Johns  being received from [facility] (unit) for routine progression of care Report consisted of patients Situation, Background, Assessment and  
Recommendations(SBAR). Information from the following report(s) SBAR, ED Summary, [facility] and Recent Results was reviewed with the receiving nurse. Opportunity for questions and clarification was provided. Assessment completed upon patients arrival to unit and care assumed.

## 2020-11-20 NOTE — PROGRESS NOTES
Hospitalist Progress Note Janneth Calzada MD 
Answering service: [phone] OR [phone] from in house phone NAME:  Niels Fothergill :  1931 MRN:  [id number] Admission Summary: As per initial admission summary This is an 66-year-old man with past medical history significant for dementia, status post CVA, and type 2 diabetes, who was in his usual state of health until the day of his presentation at the emergency room when the patient was found on the floor at the assisted living facility where the patient resides. According to report, the patient was feeling hot all night long, he got up, felt weak and his legs gave out on him, the patient collapsed to the floor and unable to get up. His neighbor heard him screaming all night long. EMS was called. When the EMS arrived at the scene, the patient's EKG shows possible ST elevation myocardial infarction. This was called to the emergency room and the patient was brought to the emergency room as a code ST-elevation myocardial infarction. When the patient arrived at the emergency room, the patient's EKG was reviewed by the cardiologist and code ST-elevation myocardial infarction was canceled. Interval history / Subjective:  
  Patient seen for Follow up of NSTEMI Patient seen and examined by the bedside, Labs, images and notes reviewed CM working on placement in long-term with hospice- I signed this paperwork today. Episode of SVT today- lasted about 4-5min Assessment & Plan: 1. Non-ST elevation myocardial infarction:   
on Plavix,  
heparin drip was stopped  as per cardiology Echocardiogram , · Estimated LVEF is <15%. Large apical thrombus  
 likely ischemic cardiomyopathy , On lovenox For short-term given his LV apical thrombus with suggest 6 to 8 weeks of anticoagulation with Eliquis at discharge.  After 8 weeks can d/c Eliquis if at fall risk an continue Plavix monotherapy. On the other hand if no fall risk, then continue Eliquis only and d/c Plavix at 8 weeks. 2.  Sepsis:resolving-  No clear source of infection at presentation CT chest , Small bilateral pleural effusions with mild bibasilar atelectasis. 
  CT scan of the abdomen and pelvis, negative for any acute changes  
 on vancomycin and cefepime. We will await blood culture results. As per CT maxifacial surgery: Right third maxillary molar demonstrates dental caries. There is a periapical lucency around the right third maxillary molar may represent a periapical 
abscess as well as inflammation in the adjacent gingiva. ID consulted , following, blood culture gram positive cocci 1/2 #hyperkalemia, resolved BMP in AM 
 
3.   atrial fibrillation/SVT:    
Cardiology recs noted Prn IV digoxin if needed Not a good candidate for long term anticoagulation . 4.  Acute kidney injury:  resolved  
due to volume depletion. tx with IVFLuids 5. Acute rhabdomyolysis: resolved This is as a result of the patient being found on the floor of his apartment at the assisted living facility. We will carry out fluid therapy and monitor the patient closely. 6.  Abnormal liver function tests:   
Mild/stable- no plans for further workup due to plans for hospice. 7.  Type 2 diabetes with hyperglycemia:  
Cont current plans for accuchecks and ss insulin 8. Syncope: resolved MRI and MRA negative for any stroke 9. Acute delirium:  waxing and waning This is most likely due to metabolic event. CT scan of the head is negative. Will monitor for now 10. Fall:  The patient was found on the floor at the assisted living facility. CT scan of the head is negative. CT scan of the cervical spine did not show any acute fracture. PT/OT consult , needs correction vs SNF 11. Dementia:  The patient most likely has underlying memory impairment. No agitation or behavioral issues 12. Hypercalcemia:  monitor Code status: Full code DVT prophylaxis: on heparin drip Care Plan discussed with: Patient/Family Disposition: SLF vs SNF Hospital Problems  Date Reviewed: 11/12/2020 Codes Class Noted POA * (Principal) NSTEMI (non-ST elevated myocardial infarction) ([facility] [address] ICD-10-CM: I21.4 ICD-9-CM: 410.70  11/12/2020 Yes Review of Systems: A comprehensive review of systems was negative except for that written in the HPI. Vital Signs:  
 Last 24hrs VS reviewed since prior progress note. Most recent are: 
Visit Vitals /70 (BP 1 Location: Left arm, BP Patient Position: At rest) Pulse 94 Temp 97.6 °F (36.4 °C) Resp 20 Ht 6' 2\" (1.88 m) Wt 103.7 kg (228 lb 11.2 oz) SpO2 95% BMI 29.36 kg/m² Intake/Output Summary (Last 24 hours) at 11/20/2020 5869 Last data filed at 11/19/2020 1365 Gross per 24 hour Intake 2280 ml Output  Net 2280 ml Physical Examination:  
     
Constitutional:  ill appearing patient , no acute distress HEENT:  Oral mucous moist,  Neck supple, EOMI Resp:  CTA bilaterally. No wheezing/rhonchi/rales. No accessory muscle use CV:  Regular rhythm, normal rate, no murmurs, gallops, rubs GI:  Soft, non distended, non tender. normoactive bowel sounds, Musculoskeletal:  No edema, warm, 2+ pulses throughout Data Review:  
 Review and/or order of clinical lab test 
Review and/or order of tests in the radiology section of CPT Review and/or order of tests in the medicine section of CPT Labs:  
 
Recent Labs  
  11/20/20 
0422 11/18/20 
0265 WBC 17.5* 17.5* HGB 11.9* 15.2 HCT 35.3* 46.0  193 Recent Labs  
  11/20/20 
0422   
K 4.0  
* CO2 21 BUN 53* CREA 0.99 * CA 9.1 No results for input(s): ALT, AP, TBIL, TBILI, TP, ALB, GLOB, GGT, AML, LPSE in the last 72 hours. No lab exists for component: SGOT, GPT, AMYP, HLPSE No results for input(s): INR, PTP, APTT, INREXT, INREXT in the last 72 hours. No results for input(s): FE, TIBC, PSAT, FERR in the last 72 hours. No results found for: FOL, RBCF No results for input(s): PH, PCO2, PO2 in the last 72 hours. No results for input(s): CPK, CKNDX, TROIQ in the last 72 hours. No lab exists for component: CPKMB No results found for: CHOL, CHOLX, CHLST, CHOLV, HDL, HDLP, LDL, LDLC, DLDLP, TGLX, TRIGL, TRIGP, CHHD, CHHDX Lab Results Component Value Date/Time Glucose (POC) 171 (H) 11/19/2020 10:57 PM  
 Glucose (POC) 120 (H) 11/19/2020 04:43 PM  
 Glucose (POC) 129 (H) 11/19/2020 11:57 AM  
 Glucose (POC) 146 (H) 11/19/2020 07:35 AM  
 Glucose (POC) 132 (H) 11/18/2020 08:44 PM  
 
Lab Results Component Value Date/Time Color YELLOW/STRAW 11/12/2020 08:06 PM  
 Appearance CLEAR 11/12/2020 08:06 PM  
 Specific gravity 1.025 11/12/2020 08:06 PM  
 pH (UA) 5.0 11/12/2020 08:06 PM  
 Protein 100 (A) 11/12/2020 08:06 PM  
 Glucose >1,000 (A) 11/12/2020 08:06 PM  
 Ketone 15 (A) 11/12/2020 08:06 PM  
 Bilirubin Negative 11/12/2020 08:06 PM  
 Urobilinogen 0.2 11/12/2020 08:06 PM  
 Nitrites Negative 11/12/2020 08:06 PM  
 Leukocyte Esterase Negative 11/12/2020 08:06 PM  
 Epithelial cells FEW 11/12/2020 08:06 PM  
 Bacteria Negative 11/12/2020 08:06 PM  
 WBC 0-4 11/12/2020 08:06 PM  
 RBC 10-20 11/12/2020 08:06 PM  
 
 
 
Medications Reviewed:  
 
Current Facility-Administered Medications Medication Dose Route Frequency  digoxin (LANOXIN) injection 125 mcg  125 mcg IntraVENous DAILY PRN  
 enoxaparin (LOVENOX) injection 100 mg  1 mg/kg SubCUTAneous Q12H  cefepime (MAXIPIME) 2 g in 0.9% sodium chloride (MBP/ADV) 100 mL MBP  2 g IntraVENous Q8H  
 QUEtiapine (SEROquel) tablet 25 mg  25 mg Oral QHS  furosemide (LASIX) tablet 20 mg  20 mg Oral DAILY  vancomycin (VANCOCIN) 1750 mg in  ml infusion  1,750 mg IntraVENous Q24H  
 metoprolol succinate (TOPROL-XL) XL tablet 25 mg  25 mg Oral DAILY  haloperidol lactate (HALDOL) injection 2 mg  2 mg IntraVENous Q8H PRN  
 insulin glargine (LANTUS) injection 15 Units  15 Units SubCUTAneous DAILY  sodium chloride (NS) flush 5-40 mL  5-40 mL IntraVENous Q8H  
 sodium chloride (NS) flush 5-40 mL  5-40 mL IntraVENous PRN  
 acetaminophen (TYLENOL) tablet 650 mg  650 mg Oral Q6H PRN Or  
 acetaminophen (TYLENOL) suppository 650 mg  650 mg Rectal Q6H PRN  polyethylene glycol (MIRALAX) packet 17 g  17 g Oral DAILY PRN  promethazine (PHENERGAN) tablet 12.5 mg  12.5 mg Oral Q6H PRN Or  
 ondansetron (ZOFRAN) injection 4 mg  4 mg IntraVENous Q6H PRN  
 lactobac ac& pc-s.therm-b.anim (SENDY Q/RISAQUAD)  1 Cap Oral DAILY  insulin lispro (HUMALOG) injection   SubCUTAneous AC&HS  
 glucose chewable tablet 16 g  4 Tab Oral PRN  
 glucagon (GLUCAGEN) injection 1 mg  1 mg IntraMUSCular PRN  
 dextrose 10 % infusion 125-250 mL  125-250 mL IntraVENous PRN  Vancomycin Pharmacy Dosing   Other Rx Dosing/Monitoring  insulin lispro (HUMALOG) injection 5 Units  5 Units SubCUTAneous TIDAC  clopidogreL (PLAVIX) tablet 75 mg  75 mg Oral DAILY  sodium chloride (NS) flush 5-10 mL  5-10 mL IntraVENous PRN  
 
______________________________________________________________________ EXPECTED LENGTH OF STAY: 4d 4h 
ACTUAL LENGTH OF STAY:          8 Jeet Quintanilla MD

## 2020-11-20 NOTE — PROGRESS NOTES
Bedside and Verbal shift change report given to Mounika Manzo RN (oncoming nurse) by Telma Lorenz RN (offgoing nurse). Report included the following information SBAR, ED Summary, MAR and Recent Results.

## 2020-11-20 NOTE — PROGRESS NOTES
Primary Nurse Esau Shukla and Nely Esquivel RN performed a dual skin assessment on this patient Impairment noted as follows:  
 
Red, blanchable sacrum Scabs left lower extremity Scattered bruising all over body Eric score is 15

## 2020-11-20 NOTE — PROGRESS NOTES
Problem: Pressure Injury - Risk of 
Goal: *Prevention of pressure injury Description: Document Eric Scale and appropriate interventions in the flowsheet. Outcome: Progressing Towards Goal 
Note: Pressure Injury Interventions: 
Sensory Interventions: Assess changes in LOC, Check visual cues for pain, Float heels, Keep linens dry and wrinkle-free, Maintain/enhance activity level, Minimize linen layers Moisture Interventions: Absorbent underpads Activity Interventions: Increase time out of bed, PT/OT evaluation Mobility Interventions: Float heels, HOB 30 degrees or less, PT/OT evaluation Nutrition Interventions: Offer support with meals,snacks and hydration Friction and Shear Interventions: HOB 30 degrees or less Problem: Patient Education: Go to Patient Education Activity Goal: Patient/Family Education Outcome: Progressing Towards Goal 
  
Problem: Falls - Risk of 
Goal: *Absence of Falls Description: Document Brittany Shultz Fall Risk and appropriate interventions in the flowsheet. Outcome: Progressing Towards Goal 
Note: Fall Risk Interventions: 
Mobility Interventions: Communicate number of staff needed for ambulation/transfer, Patient to call before getting OOB, Utilize walker, cane, or other assistive device, Utilize gait belt for transfers/ambulation Mentation Interventions: Door open when patient unattended, More frequent rounding Medication Interventions: Assess postural VS orthostatic hypotension, Patient to call before getting OOB, Teach patient to arise slowly Elimination Interventions: Call light in reach, Patient to call for help with toileting needs History of Falls Interventions: Utilize gait belt for transfer/ambulation

## 2020-11-20 NOTE — PROGRESS NOTES
Occupational Therapy Note:  
 
Spoke with PT following her attempt this afternoon. Pt unable to participate with ADL activities at this time. Pt to return to [facility] with hospice services on Monday. Will follow up as appropriate.   Thanks,  
 
 
Sonia Lynch, OT

## 2020-11-20 NOTE — PROGRESS NOTES
Problem: Mobility Impaired (Adult and Pediatric) Goal: *Acute Goals and Plan of Care (Insert Text) Description: FUNCTIONAL STATUS PRIOR TO ADMISSION: Patient was modified independent using a rolling walker for functional mobility. HOME SUPPORT PRIOR TO ADMISSION: The patient lived in [address] [place] [address]. Physical Therapy Goals Initiated 11/16/2020 1. Patient will move from supine to sit and sit to supine , scoot up and down, and roll side to side in bed with minimal assistance/contact guard assist within 7 day(s). 2.  Patient will transfer from bed to chair and chair to bed with minimal assistance/contact guard assist using the least restrictive device within 7 day(s). 3.  Patient will perform sit to stand with minimal assistance/contact guard assist within 7 day(s). 4.  Patient will ambulate with minimal assistance/contact guard assist for 10 feet with the least restrictive device within 7 day(s). Outcome: Not Progressing Towards Goal 
 PHYSICAL THERAPY TREATMENT Patient: Lucy Bran (43 y.o. male) Date: 11/20/2020 Diagnosis: NSTEMI (non-ST elevated myocardial infarction) ([facility] [address] [I21.4] NSTEMI (non-ST elevated myocardial infarction) ([facility] [address] Precautions: Fall, DNR Chart, physical therapy assessment, plan of care and goals were reviewed. ASSESSMENT Patient continues with skilled PT services and is not progressing towards goals. Note that he was able to ambulate some with therapy in his last session on 11/18, but plan at this time is for hospice care upon discharge. Attempted to have him mobilize some today, but he was not even able to elevate his arms or legs off the bed. Positioned him in chair position to set him up with his lunch and he was not able to maintain his trunk position even with the support of the bed, needing pillows to prop him in midline.   His SpO2 remained in the mid-90s with HR in the 80s-90s, but RR elevated. Plan is for him to discharge on Monday with hospice care. Due to limited ability to participate and tolerance to activity and plan of care being comfort measures, we will sign off at this time. Current Level of Function Impacting Discharge (mobility/balance): total care Other factors to consider for discharge: hospice PLAN : 
Patient continues to benefit from skilled intervention to address the above impairments. Continue treatment per established plan of care. to address goals. Recommendation for discharge: (in order for the patient to meet his/her long term goals) No skilled physical therapy/ follow up rehabilitation needs identified at this time. This discharge recommendation: 
Has been made in collaboration with the attending provider and/or case management IF patient discharges home will need the following DME: patient will have hospice care at discharge, recommend hospital bed SUBJECTIVE:  
Patient stated You are wasting your time with me.  OBJECTIVE DATA SUMMARY:  
Critical Behavior: 
Neurologic State: Alert, Appropriate for age Orientation Level: Disoriented to time, Disoriented to situation, Disoriented to place, Oriented to person Cognition: Decreased attention/concentration, Decreased command following, Impaired decision making Safety/Judgement: Lack of insight into deficits Functional Mobility Training: 
Bed Mobility: 
  
  
  
  
  
  
Transfers: 
  
  
     
  
     
  
  
  
  
Balance: 
  
Ambulation/Gait Training: 
  
  
  
  
  
  
  
  
  
  
  
  
  
  
  
  
  
  
Stairs: Therapeutic Exercises:  
Attempted gentle ROM with UEs and LEs, but he was not able to actively participate Pain Rating: 
Denies any complaints of pain Activity Tolerance:  
Poor After treatment patient left in no apparent distress:  
Call bell within reach, Side rails x 3, and bed in chair position and bed alarm in place COMMUNICATION/COLLABORATION:  
The patients plan of care was discussed with: Occupational therapist, Registered nurse, and Case management. Gaye Nice, PT Time Calculation: 18 mins

## 2020-11-20 NOTE — PROGRESS NOTES
ID Progress Note 
2020 Subjective:  
 
Emphasis on comfort noted--seems reasonable--will continue with antibiotic therapy for now Objective: Antibiotics: 1. Vancomycin 2. Cefepime Vitals:  
Visit Vitals /69 (BP 1 Location: Left arm, BP Patient Position: At rest) Pulse 100 Temp 97.6 °F (36.4 °C) Resp 16 Ht 6' 2\" (1.88 m) Wt 106.2 kg (234 lb 2.1 oz) SpO2 96% BMI 30.06 kg/m² Tmax:  Temp (24hrs), Av.5 °F (36.4 °C), Min:97 °F (36.1 °C), Max:97.6 °F (36.4 °C) Exam:  Lungs:  clear to auscultation bilaterally Heart:  regular rate and rhythm Abdomen:  soft, non-tender. Bowel sounds normal. No masses,  no organomegaly Labs:     
Recent Labs  
  20 
0422 20 
3029 WBC 17.5* 17.5* HGB 11.9* 15.2  193 BUN 53*  --   
CREA 0.99  --   
 
 
Cultures: No results found for: [facility] Lab Results Component Value Date/Time Culture result: No growth (<1,000 CFU/ML) 2020 08:06 PM  
 Culture result: NO GROWTH 5 DAYS 2020 02:28 PM  
 Culture result: NO GROWTH 5 DAYS 2020 04:22 AM  
 
 
Radiology:  
 
Line/Insert Date:        
 
Assessment: 1. Debility/dementia 2. Leukocytosis--improved 3. Tooth pain--improved Objective: 1. Stop antibiotic therapy and monitor 2. PRN this weekend Sarwat Haney MD

## 2020-11-20 NOTE — DIABETES MGMT
Diabetes Management Team to sign off at this point as patient will be going to hospice care. Please re-consult us if patient needs change. Thank you for including us in their care. Signed By: Shirlene Vo [facility] November 20, 2020

## 2020-11-20 NOTE — ROUTINE PROCESS
Bedside shift change report given to Case Mojica (oncoming nurse) by Rock Peters RN (offgoing nurse). Report included the following information SBAR and Kardex.

## 2020-11-20 NOTE — PROGRESS NOTES
BENITO: Plan will be discharge to [facility] [address]. [facility] on Monday. They are requiring 2 consecutive negative Covid test - first test negative 11/18, 2nd test pending 11/20. [facility]# [phone] is following. [facility] transport at discharge. CM noted patient transfer to . CM spoke with [facility], liaison with Kamini  #[phone]. They are following and will need to be notified of discharge date. CM called [facility] #[phone] and spoke with Maximo Wright (EOVX#[phone]). They can accept patient on Monday. CM updated Sarah with [facility] Larose of anticipated discharge Monday. Benji Sher, BSW/CRM

## 2020-11-20 NOTE — PROGRESS NOTES
TRANSFER - OUT REPORT: 
 
Verbal report given to [address] (name) on Pattie Casper  being transferred to (unit) for routine progression of care Report consisted of patients Situation, Background, Assessment and  
Recommendations(SBAR). Information from the following report(s) SBAR was reviewed with the receiving CM.

## 2020-11-21 NOTE — PROGRESS NOTES
Hospitalist Progress Note Edwardo Brown MD 
Answering service: [phone] from in house phone NAME:  Neli Marrufo :  1931 MRN:  [id number] Admission Summary: As per initial admission summary This is an 49-year-old man with past medical history significant for dementia, status post CVA, and type 2 diabetes, who was in his usual state of health until the day of his presentation at the emergency room when the patient was found on the floor at the assisted living facility where the patient resides. According to report, the patient was feeling hot all night long, he got up, felt weak and his legs gave out on him, the patient collapsed to the floor and unable to get up. His neighbor heard him screaming all night long. EMS was called. When the EMS arrived at the scene, the patient's EKG shows possible ST elevation myocardial infarction. This was called to the emergency room and the patient was brought to the emergency room as a code ST-elevation myocardial infarction. When the patient arrived at the emergency room, the patient's EKG was reviewed by the cardiologist and code ST-elevation myocardial infarction was canceled. Interval history / Subjective:  
  Patient seen for Follow up of NSTEMI Patient seen and examined by the bedside, Labs, images and notes reviewed CM working on placement in senior living with hospice- I signed this paperwork Patient SO2 stable on RA Assessment & Plan: 1. Non-ST elevation myocardial infarction:   
on Plavix,     Echocardiogram showing EF is <15%. Large apical thrombus likely ischemic cardiomyopathy , On lovenox inpatient and 6 to 8 weeks of anticoagulation w. Eliquis at [place].  \ if no fall risk, then continue Eliquis only and d/c Plavix at 8 weeks. 2.  Sepsis:resolving-  No clear source of infection at presentation CT chest , Small bilateral pleural effusions with mild bibasilar atelectasis. 
  CT scan of the abdomen and pelvis, negative for any acute changes  
 on vancomycin and cefepime. We will await blood culture results. As per CT maxifacial surgery: Right third maxillary molar demonstrates dental caries. There is a periapical lucency around the right third maxillary molar may represent a periapical 
abscess as well as inflammation in the adjacent gingiva. ID consulted , following, blood culture gram positive cocci 1/2 #hyperkalemia, resolved BMP in AM 
 
3.   atrial fibrillation/SVT:    
Cardiology recs noted Prn IV digoxin if needed Not a good candidate for long term anticoagulation . 4.  Acute kidney injury:  resolved  
due to volume depletion. tx with IVFLuids 5. Acute rhabdomyolysis: resolved This is as a result of the patient being found on the floor of his apartment at the assisted living facility. We will carry out fluid therapy and monitor the patient closely. 6.  Abnormal liver function tests:   
Mild/stable- no plans for further workup due to plans for hospice. 7.  Type 2 diabetes with hyperglycemia:  
Cont current plans for accuchecks and ss insulin 8. Syncope: resolved MRI and MRA negative for any stroke 9. Acute delirium:  waxing and waning This is most likely due to metabolic event. CT scan of the head is negative. Will monitor for now 10. Fall:  The patient was found on the floor at the assisted living facility. CT scan of the head is negative. CT scan of the cervical spine did not show any acute fracture. PT/OT consult , needs [facility] vs SNF 11. Dementia:  The patient most likely has underlying memory impairment. No agitation or behavioral issues 12. Hypercalcemia:  monitor Code status: Full code DVT prophylaxis: on heparin drip Care Plan discussed with: Patient/Family Disposition: SLF vs SNF  
 
 Hospital Problems  Date Reviewed: 11/12/2020 Codes Class Noted POA * (Principal) NSTEMI (non-ST elevated myocardial infarction) ([facility] [address] ICD-10-CM: I21.4 ICD-9-CM: 410.70  11/12/2020 Yes Review of Systems: A comprehensive review of systems was negative except for that written in the HPI. Vital Signs:  
 Last 24hrs VS reviewed since prior progress note. Most recent are: 
Visit Vitals BP (P) 113/74 (BP 1 Location: Left arm, BP Patient Position: At rest) Pulse (P) 83 Temp (P) 97.4 °F (36.3 °C) Resp (P) 24 Ht 6' 2\" (1.88 m) Wt 106.2 kg (234 lb 2.1 oz) SpO2 (P) 97% BMI 30.06 kg/m² Intake/Output Summary (Last 24 hours) at 11/20/2020 2207 Last data filed at 11/20/2020 7646 Gross per 24 hour Intake 320 ml Output  Net 320 ml Physical Examination:  
     
Constitutional:  ill appearing patient , no acute distress HEENT:  Oral mucous moist,  Neck supple, EOMI Resp:  CTA bilaterally. No wheezing/rhonchi/rales. No accessory muscle use CV:  Regular rhythm, normal rate, no murmurs, gallops, rubs GI:  Soft, non distended, non tender. normoactive bowel sounds, Musculoskeletal:  No edema, warm, 2+ pulses throughout Data Review:  
 Review and/or order of clinical lab test 
Review and/or order of tests in the radiology section of CPT Review and/or order of tests in the medicine section of CPT Labs:  
 
Recent Labs  
  11/20/20 
0422 11/18/20 
3964 WBC 17.5* 17.5* HGB 11.9* 15.2 HCT 35.3* 46.0  193 Recent Labs  
  11/20/20 
0422   
K 4.0  
* CO2 21 BUN 53* CREA 0.99 * CA 9.1 No results for input(s): ALT, AP, TBIL, TBILI, TP, ALB, GLOB, GGT, AML, LPSE in the last 72 hours. No lab exists for component: SGOT, GPT, AMYP, HLPSE No results for input(s): INR, PTP, APTT, INREXT, INREXT in the last 72 hours. No results for input(s): FE, TIBC, PSAT, FERR in the last 72 hours. No results found for: FOL, RBCF No results for input(s): PH, PCO2, PO2 in the last 72 hours. No results for input(s): CPK, CKNDX, TROIQ in the last 72 hours. No lab exists for component: CPKMB No results found for: CHOL, CHOLX, CHLST, CHOLV, HDL, HDLP, LDL, LDLC, DLDLP, TGLX, TRIGL, TRIGP, CHHD, CHHDX Lab Results Component Value Date/Time Glucose (POC) 129 (H) 11/20/2020 09:25 PM  
 Glucose (POC) 196 (H) 11/20/2020 05:16 PM  
 Glucose (POC) 235 (H) 11/20/2020 12:27 PM  
 Glucose (POC) 225 (H) 11/20/2020 06:42 AM  
 Glucose (POC) 171 (H) 11/19/2020 10:57 PM  
 
Lab Results Component Value Date/Time Color YELLOW/STRAW 11/12/2020 08:06 PM  
 Appearance CLEAR 11/12/2020 08:06 PM  
 Specific gravity 1.025 11/12/2020 08:06 PM  
 pH (UA) 5.0 11/12/2020 08:06 PM  
 Protein 100 (A) 11/12/2020 08:06 PM  
 Glucose >1,000 (A) 11/12/2020 08:06 PM  
 Ketone 15 (A) 11/12/2020 08:06 PM  
 Bilirubin Negative 11/12/2020 08:06 PM  
 Urobilinogen 0.2 11/12/2020 08:06 PM  
 Nitrites Negative 11/12/2020 08:06 PM  
 Leukocyte Esterase Negative 11/12/2020 08:06 PM  
 Epithelial cells FEW 11/12/2020 08:06 PM  
 Bacteria Negative 11/12/2020 08:06 PM  
 WBC 0-4 11/12/2020 08:06 PM  
 RBC 10-20 11/12/2020 08:06 PM  
 
 
 
Medications Reviewed:  
 
Current Facility-Administered Medications Medication Dose Route Frequency  digoxin (LANOXIN) injection 125 mcg  125 mcg IntraVENous DAILY PRN  
 enoxaparin (LOVENOX) injection 100 mg  1 mg/kg SubCUTAneous Q12H  
 QUEtiapine (SEROquel) tablet 25 mg  25 mg Oral QHS  furosemide (LASIX) tablet 20 mg  20 mg Oral DAILY  metoprolol succinate (TOPROL-XL) XL tablet 25 mg  25 mg Oral DAILY  haloperidol lactate (HALDOL) injection 2 mg  2 mg IntraVENous Q8H PRN  
 insulin glargine (LANTUS) injection 15 Units  15 Units SubCUTAneous DAILY  sodium chloride (NS) flush 5-40 mL  5-40 mL IntraVENous Q8H  
 sodium chloride (NS) flush 5-40 mL  5-40 mL IntraVENous PRN  
  acetaminophen (TYLENOL) tablet 650 mg  650 mg Oral Q6H PRN Or  
 acetaminophen (TYLENOL) suppository 650 mg  650 mg Rectal Q6H PRN  polyethylene glycol (MIRALAX) packet 17 g  17 g Oral DAILY PRN  promethazine (PHENERGAN) tablet 12.5 mg  12.5 mg Oral Q6H PRN Or  
 ondansetron (ZOFRAN) injection 4 mg  4 mg IntraVENous Q6H PRN  
 lactobac ac& pc-s.therm-b.anim (SENDY Q/RISAQUAD)  1 Cap Oral DAILY  insulin lispro (HUMALOG) injection   SubCUTAneous AC&HS  
 glucose chewable tablet 16 g  4 Tab Oral PRN  
 glucagon (GLUCAGEN) injection 1 mg  1 mg IntraMUSCular PRN  
 dextrose 10 % infusion 125-250 mL  125-250 mL IntraVENous PRN  
 insulin lispro (HUMALOG) injection 5 Units  5 Units SubCUTAneous TIDAC  clopidogreL (PLAVIX) tablet 75 mg  75 mg Oral DAILY  sodium chloride (NS) flush 5-10 mL  5-10 mL IntraVENous PRN  
 
______________________________________________________________________ EXPECTED LENGTH OF STAY: 4d 19h ACTUAL LENGTH OF STAY:          8 Keyla Malagon MD

## 2020-11-21 NOTE — PROGRESS NOTES
1840:  RN came into room due to pulse oximeter alarming. Patient's oxygen saturation was ranging 73-75% on room air. Patient placed on 6 liters of oxygen via nasal cannula which increased patient's oxygen saturations into the 90's. 1845: RN notified MD patient's work of breathing is significantly increased, his respirations are 36 per minute, he is now requiring 6 liters of oxygen via nasal cannula to maintain oxygen saturation in the 90's, and is diaphoretic. MD notified that patient sounds course and has secretions he is unable to expectorate. Per Dr. Saul Moore place order for stat portable chest xray and deep suction patient. Respiratory notified and stated they would come to deep suction patient. Will continue to monitor. 1930:  RN called respiratory once more who stated they would be up shortly to see patient. Patient is still tachypnic and experiencing increased work of breathing. Patient's oxygen saturation is still in the 90's on 6 liters nasal cannula. 1934:  RN called and left a message on [phone] phone and left unit number for him to call back so RN could provide him with updates on the patient's condition. 1940: Walter Washington returned call. RN explained situation and updated him on patient's current condition, his need for 6 liters of oxygen, and increased work of breathing/ distress, to see if he was local and would want to come see patient. He stated he had driven to [place] to check on the patient's prior residence but that he could drive back if it was thought necessary. RN informed Briana De Paz that the hospitalist would be paged and asked to come assess patient and contact him with further recommendations and details on patient's condition. 1947: Hospitalist paged. 1955:  RN notified hospitalist Cayetano Townsend NP of situation and of patient's persisting condition. Per NP he would come to assess patient and then reach out to Briana De Paz. 1958:  Bedside shift change report given to Bhavik Staley RN (oncoming nurse) by Violette Delcid RN (offgoing nurse). Report included the following information SBAR, Kardex, Intake/Output, MAR and Recent Results.

## 2020-11-21 NOTE — PROGRESS NOTES
1030:  MD paged. 1039:  RN notified Dr. Tristan Sheriff of BP of 99/66 and HR 94. Per MD hold patient's 0900 dose of lasix but administer scheduled metoprolol. Will continue to monitor. 1536:  RN notified MD of patient's BP of 88/50 and that BP was 93/65 when rechecked on the right arm. Per MD fluid orders would be placed. Will continue to monitor 441 0134:  RN notified MD patient was having difficulty swallowing some of the food he was receiving and asked to see if patient could be placed on a diet with softer foods. Per MD place order for mechanical soft diet. Order placed. Will continue to monitor.

## 2020-11-21 NOTE — PROGRESS NOTES
Bedside and Verbal shift change report given to Alvin Marshall RN (oncoming nurse) by Beau Daigle RN (offgoing nurse). Report included the following information SBAR, Kardex, Procedure Summary, Intake/Output, MAR and Recent Results.

## 2020-11-21 NOTE — PROGRESS NOTES
Bedside and Verbal shift change report given to Chema Armstrong RN (oncoming nurse) by MARVIN Kerr (offgoing nurse). Report included the following information SBAR, Kardex, Procedure Summary, Intake/Output, MAR and Recent Results.

## 2020-11-22 NOTE — PROGRESS NOTES
Adiel NP Progress note Name: Lauryn Guillory YOB: 1931 MRN: [id number] Admission Date: 11/12/2020  2:06 PM 
 
Date of service: 11/21/2020 12:13 AM 
 
Situation / Background:  
 
Asked by primary nurse to evaluate increasing oxygen requirement, upper airway congestion and lethargy, worsening since this afternoon. Patient is an 15-year-old gentleman admitted 11/12/2020 with NSTEMI and sepsis. Significant past medical history for dementia, CVA, fibrillation, type II DM. In reviewing his chart, it appears patient has been slowly declining since admission. Team has been discussing palliative/hospice care. Roberto Pall is POA Subjective:  
 
Unable to obtain Objective: · Somnolent, responds to tactile stimuli. No meaningful verbalizations · PERRLA, EOMI. Face symmetrical, tongue midline · Significant upper airway congestion, weak cough, thin white sputum portable suction. Scattered bibasilar crackles. 93% on 6 L nasal cannula. Mouth breather, respiratory rate 36 
· Heart rate regular · Abdomen soft, nontender not distended · Extremities normal, no edema Patient Vitals for the past 8 hrs: 
 Temp Pulse Resp BP SpO2  
11/21/20 2049 97.7 °F (36.5 °C) (!) 101 (!) 36 103/67 92 % 11/21/20 2045     94 % 11/21/20 2019  88 (!) 40 105/67 92 % 11/21/20 1845   (!) 36    
11/21/20 1529 97.3 °F (36.3 °C) 85 20 93/65 92 % 11/21/20 1528 97.3 °F (36.3 °C)     Assessment/ Plan:  
 
Respiratory failure, likely multifactorial 
Worsening hypoxia 
> CXR resulted approximately 7 PM shows increased consolidation in the lung bases, mild to moderate pulmonary edema and mild bilateral pleural effusions 
> U CX BC X both negative growth · 15 L NRB · Lasix 40 mg IV now Long discussion w Ramiro brooke via phone regarding pt's decline. Balwinder Fishman realized pt will die soon but was surprise that it \"was happening soon. \"  He is currently in [place] and will call the unit at 3am before he begins the 11 hour drive to [place]. Fady verbalized understanding that pt may die tonight. Karen Ellsworth, MSN, RN, NP-C 
[phone] or via [employer]

## 2020-11-22 NOTE — PROGRESS NOTES
11/22/20 6740 Vital Signs Temp 98.4 °F (36.9 °C) Temp Source Axillary Pulse (Heart Rate) 98 Heart Rate Source Brachial;Left;Monitor Cardiac Rhythm A Fib Resp Rate 24  
O2 Sat (%) 97 % Level of Consciousness Responds to Voice BP (!) 76/48 MAP (Calculated) (!) 57 BP 1 Method Automatic  
BP 1 Location Left arm MEWS Score 5 Box Number #34  
vitals consistent with previous on comfort measures MD aware of condition. Will continue with care.

## 2020-11-22 NOTE — PROGRESS NOTES
11/21/20 2045 Oxygen Therapy O2 Sat (%) 94 % Pulse via Oximetry 101 beats per minute O2 Device Nasal cannula O2 Flow Rate (L/min) 14 l/min FIO2 (%) 50 % Suction not needed at this time

## 2020-11-22 NOTE — PROGRESS NOTES
responded for family support. Pt's nephew arrived from [facility]. He was able to get her father, pt's brother on the phone to have prayer.  provided pastoral listening, support and prayer. Let him know of chaplains availability and support.  follow up as needed. [address] Danni Boyd, MACE 
 [phone])

## 2020-11-22 NOTE — PROGRESS NOTES
11/21/20 2049 Vital Signs Temp 97.7 °F (36.5 °C) Temp Source Axillary Pulse (Heart Rate) (!) 101 Heart Rate Source Brachial;Monitor Resp Rate (!) 36  
O2 Sat (%) 92 % Level of Consciousness Responds to Voice /67 BP 1 Method Automatic  
BP 1 Location Left arm MEWS Score 5 Box Number 34 Patient lethargic not responding to painful stimulation, ,oxygen saturation very low,hospitalist notified up on floor changes made in care. POA in [place] notified as well patient right now just being kept comfortable no aggressive treatment recommended and family in agreement. Patient's EF IS @<15% will continue to monitor.

## 2020-11-22 NOTE — PROGRESS NOTES
Problem: Pressure Injury - Risk of 
Goal: *Prevention of pressure injury Description: Document Eric Scale and appropriate interventions in the flowsheet. Outcome: Progressing Towards Goal 
Note: Pressure Injury Interventions: 
Sensory Interventions: Assess changes in LOC, Avoid rigorous massage over bony prominences, Check visual cues for pain, Float heels, Keep linens dry and wrinkle-free, Minimize linen layers Moisture Interventions: Absorbent underpads, Check for incontinence Q2 hours and as needed, Limit adult briefs, Minimize layers Activity Interventions: Pressure redistribution bed/mattress(bed type) Mobility Interventions: Float heels, HOB 30 degrees or less, Pressure redistribution bed/mattress (bed type) Nutrition Interventions: (pt unable to swallow at this time) Friction and Shear Interventions: HOB 30 degrees or less, Lift sheet, Minimize layers Problem: Falls - Risk of 
Goal: *Absence of Falls Description: Document Lana Wood Fall Risk and appropriate interventions in the flowsheet. Outcome: Progressing Towards Goal 
Note: Fall Risk Interventions: 
Mobility Interventions: Communicate number of staff needed for ambulation/transfer Mentation Interventions: Door open when patient unattended, More frequent rounding, Reorient patient, Room close to nurse's station, Toileting rounds Medication Interventions: Evaluate medications/consider consulting pharmacy Elimination Interventions: Call light in reach History of Falls Interventions: Bed/chair exit alarm, Door open when patient unattended, Room close to nurse's station Problem: Breathing Pattern - Ineffective Goal: *Absence of hypoxia Outcome: Progressing Towards Goal 
Note: Patient on 5L NC Assess respiratory rate, oxygen saturation, breath sounds, breathing pattern Monitor for changes in respiratory status

## 2020-11-22 NOTE — PROGRESS NOTES
responded to staff request for end-of-life support. No family present at this time.  has silent prayer at bedside. Please contact [phone] [address] [facility] for further support.  follow up as needed. [address] Danni Boyd, MACE 
 [phone]

## 2020-11-22 NOTE — PROGRESS NOTES
Spoke with Dr. Liyah Cho about patients care. Per Dr. Liyah Cho hold all PO meds (pt unable to swallow at this time), do not do accu checks or give insulin at this time. 1014-advised Dr. Liyah Cho of mews of 5 via perfect serve Vitals w/ MEWS Score (last day) Date/Time MEWS Score Pulse Resp Temp BP Level of Consciousness SpO2  
 11/22/20 0824  5  83  24  98.2 °F (36.8 °C)  (!) 72/57  Responds to Voice  96 % 11/22/20 0203  5  98  24  98.4 °F (36.9 °C)  (!) 76/48  Responds to Voice  97 % 11/21/20 2049  5  (!) 101  (!) 36  97.7 °F (36.5 °C)  103/67  Responds to Voice  92 % 11/21/20 2045              94 % 11/21/20 2019    88  (!) 40    105/67    92 % 11/21/20 1845      (!) 36          
 11/21/20 1529  2  85  20  97.3 °F (36.3 °C)  93/65  Alert  92 % 11/21/20 1528        97.3 °F (36.3 °C)        
 11/21/20 1027  2  94  20  98.1 °F (36.7 °C)  99/66  Alert  97 % 11/21/20 0114  2  92  24  97.5 °F (36.4 °C)  104/65  Alert  95 % Also, asked Dr. Liyah Cho via perfect serve to give patient something IV for pain /anxiety as patient is moaning 1140-Per Dr. Liyah Cho ok to discontinue remote telemetry for patient and hold lovenox at this time 1518-paged Dr. Liyah Cho and sent perfect serve.   Nephew is in patients room & would like to talk with MD

## 2020-11-22 NOTE — PROGRESS NOTES
Hospitalist Progress Note Dave Roberts MD 
Answering service: [phone] OR 36 from in house phone NAME:  oBb Bowman :  [id number] MRN:  [id number] Admission Summary: As per initial admission summary This is an 51-year-old man with past medical history significant for dementia, status post CVA, and type 2 diabetes, who was in his usual state of health until the day of his presentation at the emergency room when the patient was found on the floor at the assisted living facility where the patient resides. According to report, the patient was feeling hot all night long, he got up, felt weak and his legs gave out on him, the patient collapsed to the floor and unable to get up. His neighbor heard him screaming all night long. EMS was called. When the EMS arrived at the scene, the patient's EKG shows possible ST elevation myocardial infarction. This was called to the emergency room and the patient was brought to the emergency room as a code ST-elevation myocardial infarction. When the patient arrived at the emergency room, the patient's EKG was reviewed by the cardiologist and code ST-elevation myocardial infarction was canceled. Interval history / Subjective:  
 
Patient seen and examined by the bedside, Labs, images and notes reviewed CM working on placement in retirement with hospice- I signed this paperwork Patient SO2 stable on O2 via NC, he has no complaints for me today Assessment & Plan: 1. Non-ST elevation myocardial infarction:   
on Plavix,     Echocardiogram showing EF is <15%. Large apical thrombus likely ischemic cardiomyopathy , On lovenox inpatient and 6 to 8 weeks of anticoagulation w. Eliquis at [place].  \ if no fall risk, then continue Eliquis only and d/c Plavix at 8 weeks. 2.  Sepsis:resolving-  No clear source of infection at presentation CT chest , Small bilateral pleural effusions with mild bibasilar atelectasis. 
  CT scan of the abdomen and pelvis, negative for any acute changes  
 on vancomycin and cefepime. We will await blood culture results. As per CT maxifacial surgery: Right third maxillary molar demonstrates dental caries. There is a periapical lucency around the right third maxillary molar may represent a periapical 
abscess as well as inflammation in the adjacent gingiva. ID consulted , following, blood culture gram positive cocci 1/2 #hyperkalemia, resolved BMP in AM 
 
3.   atrial fibrillation/SVT:    
Cardiology recs noted Prn IV digoxin if needed Not a good candidate for long term anticoagulation . 4.  Acute kidney injury:  resolved  
due to volume depletion. tx with IVFLuids 5. Acute rhabdomyolysis: resolved This is as a result of the patient being found on the floor of his apartment at the assisted living facility. We will carry out fluid therapy and monitor the patient closely. 6.  Abnormal liver function tests:   
Mild/stable- no plans for further workup due to plans for hospice. 7.  Type 2 diabetes with hyperglycemia:  
Cont current plans for accuchecks and ss insulin 8. Syncope: resolved MRI and MRA negative for any stroke 9. Acute delirium:  waxing and waning This is most likely due to metabolic event. CT scan of the head is negative. Will monitor for now 10. Fall:  The patient was found on the floor at the assisted living facility. CT scan of the head is negative. CT scan of the cervical spine did not show any acute fracture. PT/OT consult , needs [facility] vs SNF 11. Dementia:  The patient most likely has underlying memory impairment. No agitation or behavioral issues 12. Hypercalcemia:  monitor Code status: Full code DVT prophylaxis: on heparin drip Care Plan discussed with: Patient/Family Disposition: SLF vs SNF  
 
 Hospital Problems  Date Reviewed: 11/12/2020 Codes Class Noted POA * (Principal) [facility]non[facility] ICD-10-CM: I21.4 ICD-9-CM: 410.70  11/12/2020 Yes Review of Systems: A comprehensive review of systems was negative except for that written in the HPI. Vital Signs:  
 Last 24hrs VS reviewed since prior progress note. Most recent are: 
Visit Vitals /67 (BP 1 Location: Left arm) Pulse (!) 101 Temp 97.7 °F (36.5 °C) Resp (!) 36 Ht 6' 2\" (1.88 m) Wt 104.6 kg (230 lb 9.6 oz) SpO2 92% BMI 29.61 kg/m² No intake or output data in the 24 hours ending 11/21/20 6388 Physical Examination:  
     
Constitutional:  ill appearing patient , no acute distress HEENT:  Oral mucous moist,  Neck supple, EOMI Resp:  CTA bilaterally. No wheezing/rhonchi/rales. No accessory muscle use CV:  Regular rhythm, normal rate, no murmurs, gallops, rubs GI:  Soft, non distended, non tender. normoactive bowel sounds, Musculoskeletal:  No edema, warm, 2+ pulses throughout Data Review:  
 Review and/or order of clinical lab test 
Review and/or order of tests in the radiology section of CPT Review and/or order of tests in the medicine section of CPT Labs:  
 
Recent Labs  
  11/21/20 
0541 11/20/20 
0422 WBC 23.0* 17.5* HGB 10.1* 11.9*  
HCT 30.9* 35.3*  
 207 Recent Labs  
  11/21/20 
0541 11/20/20 
0422  140  
K 4.2 4.0  
* 113* CO2 19* 21 BUN 57* 53* CREA 0.98 0.99 * 207* CA 9.4 9.1 No results for input(s): ALT, AP, TBIL, TBILI, TP, ALB, GLOB, GGT, AML, LPSE in the last 72 hours. No lab exists for component: SGOT, GPT, AMYP, HLPSE No results for input(s): INR, PTP, APTT, INREXT, INREXT in the last 72 hours. No results for input(s): FE, TIBC, PSAT, FERR in the last 72 hours. No results found for: FOL, RBCF No results for input(s): PH, PCO2, PO2 in the last 72 hours. No results for input(s): CPK, CKNDX, TROIQ in the last 72 hours. No lab exists for component: CPKMB No results found for: CHOL, CHOLX, CHLST, CHOLV, HDL, HDLP, LDL, LDLC, DLDLP, TGLX, TRIGL, TRIGP, CHHD, CHHDX Lab Results Component Value Date/Time Glucose (POC) 254 (H) 11/21/2020 08:20 PM  
 Glucose (POC) 186 (H) 11/21/2020 04:25 PM  
 Glucose (POC) 186 (H) 11/21/2020 11:37 AM  
 Glucose (POC) 208 (H) 11/21/2020 07:22 AM  
 Glucose (POC) 129 (H) 11/20/2020 09:25 PM  
 
Lab Results Component Value Date/Time Color YELLOW/STRAW 11/12/2020 08:06 PM  
 Appearance CLEAR 11/12/2020 08:06 PM  
 Specific gravity 1.025 11/12/2020 08:06 PM  
 pH (UA) 5.0 11/12/2020 08:06 PM  
 Protein 100 (A) 11/12/2020 08:06 PM  
 Glucose >1,000 (A) 11/12/2020 08:06 PM  
 Ketone 15 (A) 11/12/2020 08:06 PM  
 Bilirubin Negative 11/12/2020 08:06 PM  
 Urobilinogen 0.2 11/12/2020 08:06 PM  
 Nitrites Negative 11/12/2020 08:06 PM  
 Leukocyte Esterase Negative 11/12/2020 08:06 PM  
 Epithelial cells FEW 11/12/2020 08:06 PM  
 Bacteria Negative 11/12/2020 08:06 PM  
 WBC 0-4 11/12/2020 08:06 PM  
 RBC 10-20 11/12/2020 08:06 PM  
 
 
 
Medications Reviewed:  
 
Current Facility-Administered Medications Medication Dose Route Frequency  dextrose 5% infusion  25 mL/hr IntraVENous CONTINUOUS  
 digoxin (LANOXIN) injection 125 mcg  125 mcg IntraVENous DAILY PRN  
 enoxaparin (LOVENOX) injection 100 mg  1 mg/kg SubCUTAneous Q12H  
 [Held by provider] QUEtiapine (SEROquel) tablet 25 mg  25 mg Oral QHS  [Held by provider] furosemide (LASIX) tablet 20 mg  20 mg Oral DAILY  metoprolol succinate (TOPROL-XL) XL tablet 25 mg  25 mg Oral DAILY  haloperidol lactate (HALDOL) injection 2 mg  2 mg IntraVENous Q8H PRN  
 insulin glargine (LANTUS) injection 15 Units  15 Units SubCUTAneous DAILY  sodium chloride (NS) flush 5-40 mL  5-40 mL IntraVENous Q8H  
 sodium chloride (NS) flush 5-40 mL  5-40 mL IntraVENous PRN  
  acetaminophen (TYLENOL) tablet 650 mg  650 mg Oral Q6H PRN Or  
 acetaminophen (TYLENOL) suppository 650 mg  650 mg Rectal Q6H PRN  polyethylene glycol (MIRALAX) packet 17 g  17 g Oral DAILY PRN  promethazine (PHENERGAN) tablet 12.5 mg  12.5 mg Oral Q6H PRN Or  
 ondansetron (ZOFRAN) injection 4 mg  4 mg IntraVENous Q6H PRN  
 lactobac ac& pc-s.therm-b.anim (SENDY Q/RISAQUAD)  1 Cap Oral DAILY  insulin lispro (HUMALOG) injection   SubCUTAneous AC&HS  
 glucose chewable tablet 16 g  4 Tab Oral PRN  
 glucagon (GLUCAGEN) injection 1 mg  1 mg IntraMUSCular PRN  
 dextrose 10 % infusion 125-250 mL  125-250 mL IntraVENous PRN  
 insulin lispro (HUMALOG) injection 5 Units  5 Units SubCUTAneous TIDAC  clopidogreL (PLAVIX) tablet 75 mg  75 mg Oral DAILY  sodium chloride (NS) flush 5-10 mL  5-10 mL IntraVENous PRN  
 
______________________________________________________________________ EXPECTED LENGTH OF STAY: 4d 19h ACTUAL LENGTH OF STAY:          9 Darrin Delgado MD

## 2020-11-23 NOTE — PROGRESS NOTES
Bedside and Verbal shift change report given to MARVIN Mendiola (oncoming nurse) by Elisa Blanco RN (offgoing nurse). Report included the following information SBAR, ED Summary, Procedure Summary, Intake/Output, MAR and Recent Results.

## 2020-11-23 NOTE — PROGRESS NOTES
Death Pronouncement Note Events prior to patients death: 
 
Called to bedside at 5:30 AM for unresponsive and pulseless patient. On exam, no heart sounds or breath sounds were noted after 1 minute of auscultation. Pupils were fixed and dilated without pupillary light reflex. Patient was pronounced dead on 11/23/2020  at [id number] Date/Time of death:  
 
11/23/2020 at [id number] Cause:  
 
Immediate: Acute hypoxemic respiratory failure Underlying cause: Non-ST elevation MI, sepsis, atrial fibrillation, acute kidney injury, abnormal liver function, T2DM Hospital Problems  Date Reviewed: 11/12/2020 Codes Class Noted POA * (Principal) NSTEMI (non-ST elevated myocardial infarction) ([facility] ICD-10-CM: I21.4 ICD-9-CM: 410.70  11/12/2020 Yes Physician Pronouncing Death: Prerna Myers NP Attending Physician of Record:   George Hernandez MD  
 
Events related to death: 
 
Was code called: NO 
 notified: Kathy Mendez Family notified: Kathy Mendez Autopsy requested: unknown Death certificate completed: NO 
Code Status Prior to Death: DNR Discharge summary and death certificate will be completed by: George Hernandez MD 
 
Date of Service:  11/23/2020

## 2020-11-23 NOTE — PROGRESS NOTES
Spoke to JADA Buenrostro. He stated that since there are no comfort care orders in to continue meds. I let him know that patient is not alert enough to swallow, so will hold PO but will continue accu-checks and SSI. Vitals w/ MEWS Score (last day) Date/Time MEWS Score Pulse Resp Temp BP Level of Consciousness SpO2  
 11/22/20 2026  5  92  24  98.5 °F (36.9 °C)  (!) 71/45  Responds to Voice  98 % 11/22/20 1555  5  (!) 112  24  97.8 °F (36.6 °C)  91/60  Alert  96 % 11/22/20 0824  5  83  24  98.2 °F (36.8 °C)  (!) 72/57  Responds to Voice  96 % 11/22/20 0203  5  98  24  98.4 °F (36.9 °C)  (!) 76/48  Responds to Voice  97 % 11/21/20 2049  5  (!) 101  (!) 36  97.7 °F (36.5 °C)  103/67  Responds to Voice  92 % 11/21/20 2045              94 % 11/21/20 2019    88  (!) 40    105/67    92 % 11/21/20 1845      (!) 36          
 11/21/20 1529  2  85  20  97.3 °F (36.3 °C)  93/65  Alert  92 % 11/21/20 1528        97.3 °F (36.3 °C)        
 11/21/20 1027  2  94  20  98.1 °F (36.7 °C)  99/66  Alert  97 % 11/21/20 0114  2  92  24  97.5 °F (36.4 °C)  104/65  Alert  95 % Vitals w/ MEWS Score (last day) Date/Time MEWS Score Pulse Resp Temp BP Level of Consciousness SpO2  
 11/23/20 0141  5  81  26  98.2 °F (36.8 °C)  (!) 74/47  Responds to Voice  98 % 11/22/20 2026  5  92  24  98.5 °F (36.9 °C)  (!) 71/45  Responds to Voice  98 % 11/22/20 1555  5  (!) 112  24  97.8 °F (36.6 °C)  91/60  Alert  96 % 11/22/20 0824  5  83  24  98.2 °F (36.8 °C)  (!) 72/57  Responds to Voice  96 % 11/22/20 0203  5  98  24  98.4 °F (36.9 °C)  (!) 76/48  Responds to Voice  97 % Patient is the same as beginning of shift. NP aware of pt condition. Will continue to monitor.

## 2020-11-23 NOTE — PROGRESS NOTES
Hospitalist Progress Note Chaitanya Coffey MD 
Answering service: [phone] from in house phone NAME:  Iram Goa :  [id number] MRN:  [id number] Admission Summary: As per initial admission summary This is an 79-year-old man with past medical history significant for dementia, status post CVA, and type 2 diabetes, who was in his usual state of health until the day of his presentation at the emergency room when the patient was found on the floor at the assisted living facility where the patient resides. According to report, the patient was feeling hot all night long, he got up, felt weak and his legs gave out on him, the patient collapsed to the floor and unable to get up. His neighbor heard him screaming all night long. EMS was called. When the EMS arrived at the scene, the patient's EKG shows possible ST elevation myocardial infarction. This was called to the emergency room and the patient was brought to the emergency room as a code ST-elevation myocardial infarction. When the patient arrived at the emergency room, the patient's EKG was reviewed by the cardiologist and code ST-elevation myocardial infarction was canceled. Interval history / Subjective:  
 
Patient seen and examined by the bedside, Labs, images and notes reviewed CM working on placement in [place] with hospice- I signed this paperwork Patient SO2 stable on O2 via [place], he is very lethargic today His son drove up from [place] overnight Assessment & Plan: 1. Non-ST elevation myocardial infarction:   
on Plavix,     Echocardiogram showing EF is <15%. Large apical thrombus likely ischemic cardiomyopathy , On lovenox inpatient and 6 to 8 weeks of anticoagulation w. Eliquis at [place].  \ if no fall risk, then continue Eliquis only and d/c Plavix at 8 weeks. 2.  Sepsis:resolving-  No clear source of infection at presentation CT chest , Small bilateral pleural effusions with mild bibasilar atelectasis. 
  CT scan of the abdomen and pelvis, negative for any acute changes  
 on vancomycin and cefepime. We will await blood culture results. As per CT maxifacial surgery: Right third maxillary molar demonstrates dental caries. There is a periapical lucency around the right third maxillary molar may represent a periapical 
abscess as well as inflammation in the adjacent gingiva. ID consulted , following, blood culture gram positive cocci 1/2 #hyperkalemia, resolved BMP in AM 
 
3.   atrial fibrillation/SVT:    
Cardiology recs noted Prn IV digoxin if needed Not a good candidate for long term anticoagulation . 4.  Acute kidney injury:  resolved  
due to volume depletion. tx with IVFLuids 5. Acute rhabdomyolysis: resolved This is as a result of the patient being found on the floor of his apartment at the assisted living facility. We will carry out fluid therapy and monitor the patient closely. 6.  Abnormal liver function tests:   
Mild/stable- no plans for further workup due to plans for hospice. 7.  Type 2 diabetes with hyperglycemia:  
Cont current plans for accuchecks and ss insulin 8. Syncope: resolved MRI and MRA negative for any stroke 9. Acute delirium:  waxing and waning This is most likely due to metabolic event. CT scan of the head is negative. Will monitor for now 10. Fall:  The patient was found on the floor at the assisted living facility. CT scan of the head is negative. CT scan of the cervical spine did not show any acute fracture. PT/OT consult , needs retirement vs SNF 11. Dementia:  The patient most likely has underlying memory impairment. No agitation or behavioral issues 12. Hypercalcemia:  monitor 13. Acute hypoxemic resp failure- muntifactorial 
Mucous plugging, CHF, aspiration Patient likely to pass soon- discussed with son- plans for admission to inpatient hospice Code status: Full code DVT prophylaxis: on heparin drip Care Plan discussed with: Patient/Family Disposition: SLF vs SNF Hospital Problems  Date Reviewed: 11/12/2020 Codes Class Noted POA * (Principal) NSTEMI (non-ST elevated myocardial infarction) ([facility] [address] ICD-10-CM: I21.4 ICD-9-CM: 410.70  11/12/2020 Yes Review of Systems: A comprehensive review of systems was negative except for that written in the HPI. Vital Signs:  
 Last 24hrs VS reviewed since prior progress note. Most recent are: 
Visit Vitals BP (!) 71/45 (BP 1 Location: Left arm, BP Patient Position: At rest) Pulse 92 Temp 98.5 °F (36.9 °C) Resp 24 Ht 6' 2\" (1.88 m) Wt 103.4 kg (228 lb) SpO2 98% BMI 29.27 kg/m² Intake/Output Summary (Last 24 hours) at 11/23/2020 0117 Last data filed at 11/22/2020 5075 Gross per 24 hour Intake  Output 350 ml Net -350 ml Physical Examination:  
     
Constitutional:  ill appearing patient , no acute distress HEENT:  Oral mucous moist,  Neck supple, EOMI Resp:  CTA bilaterally. No wheezing/rhonchi/rales. No accessory muscle use CV:  Regular rhythm, normal rate, no murmurs, gallops, rubs GI:  Soft, non distended, non tender. normoactive bowel sounds, Musculoskeletal:  No edema, warm, 2+ pulses throughout Data Review:  
 Review and/or order of clinical lab test 
Review and/or order of tests in the radiology section of CPT Review and/or order of tests in the medicine section of CPT Labs:  
 
Recent Labs  
  11/21/20 
0541 11/20/20 0422 WBC 23.0* 17.5* HGB 10.1* 11.9*  
HCT 30.9* 35.3*  
 207 Recent Labs  
  11/22/20 
7558 11/21/20 
0541 11/20/20 
0422  141 140  
K 4.8 4.2 4.0  
* 113* 113* CO2 20* 19* 21 BUN 76* 57* 53* CREA 1.48* 0.98 0.99 * 185* 207* CA 9.7 9.4 9.1 No results for input(s): ALT, AP, TBIL, TBILI, TP, ALB, GLOB, GGT, AML, LPSE in the last 72 hours. No lab exists for component: SGOT, GPT, AMYP, HLPSE No results for input(s): INR, PTP, APTT, INREXT, INREXT in the last 72 hours. No results for input(s): FE, TIBC, PSAT, FERR in the last 72 hours. No results found for: FOL, RBCF No results for input(s): PH, PCO2, PO2 in the last 72 hours. No results for input(s): CPK, CKNDX, TROIQ in the last 72 hours. No lab exists for component: CPKMB No results found for: CHOL, CHOLX, CHLST, CHOLV, HDL, HDLP, LDL, LDLC, DLDLP, TGLX, TRIGL, TRIGP, CHHD, CHHDX Lab Results Component Value Date/Time Glucose (POC) 248 (H) 11/22/2020 09:17 PM  
 Glucose (POC) 225 (H) 11/22/2020 06:02 AM  
 Glucose (POC) 254 (H) 11/21/2020 08:20 PM  
 Glucose (POC) 186 (H) 11/21/2020 04:25 PM  
 Glucose (POC) 186 (H) 11/21/2020 11:37 AM  
 
Lab Results Component Value Date/Time Color YELLOW/STRAW 11/12/2020 08:06 PM  
 Appearance CLEAR 11/12/2020 08:06 PM  
 Specific gravity 1.025 11/12/2020 08:06 PM  
 pH (UA) 5.0 11/12/2020 08:06 PM  
 Protein 100 (A) 11/12/2020 08:06 PM  
 Glucose >1,000 (A) 11/12/2020 08:06 PM  
 Ketone 15 (A) 11/12/2020 08:06 PM  
 Bilirubin Negative 11/12/2020 08:06 PM  
 Urobilinogen 0.2 11/12/2020 08:06 PM  
 Nitrites Negative 11/12/2020 08:06 PM  
 Leukocyte Esterase Negative 11/12/2020 08:06 PM  
 Epithelial cells FEW 11/12/2020 08:06 PM  
 Bacteria Negative 11/12/2020 08:06 PM  
 WBC 0-4 11/12/2020 08:06 PM  
 RBC 10-20 11/12/2020 08:06 PM  
 
 
 
Medications Reviewed:  
 
Current Facility-Administered Medications Medication Dose Route Frequency  midodrine (PROAMATINE) tablet 10 mg  10 mg Oral TID WITH MEALS  
 LORazepam (ATIVAN) injection 0.5 mg  0.5 mg IntraVENous Q4H PRN  
 morphine injection 0.5 mg  0.5 mg IntraVENous Q3H PRN  
 dextrose 5% infusion  25 mL/hr IntraVENous CONTINUOUS  
 digoxin (LANOXIN) injection 125 mcg  125 mcg IntraVENous DAILY PRN  
  enoxaparin (LOVENOX) injection 100 mg  1 mg/kg SubCUTAneous Q12H  
 [Held by provider] QUEtiapine (SEROquel) tablet 25 mg  25 mg Oral QHS  [Held by provider] furosemide (LASIX) tablet 20 mg  20 mg Oral DAILY  [Held by provider] metoprolol succinate (TOPROL-XL) XL tablet 25 mg  25 mg Oral DAILY  haloperidol lactate (HALDOL) injection 2 mg  2 mg IntraVENous Q8H PRN  
 [Held by provider] insulin glargine (LANTUS) injection 15 Units  15 Units SubCUTAneous DAILY  sodium chloride (NS) flush 5-40 mL  5-40 mL IntraVENous Q8H  
 sodium chloride (NS) flush 5-40 mL  5-40 mL IntraVENous PRN  
 acetaminophen (TYLENOL) tablet 650 mg  650 mg Oral Q6H PRN Or  
 acetaminophen (TYLENOL) suppository 650 mg  650 mg Rectal Q6H PRN  polyethylene glycol (MIRALAX) packet 17 g  17 g Oral DAILY PRN  promethazine (PHENERGAN) tablet 12.5 mg  12.5 mg Oral Q6H PRN Or  
 ondansetron (ZOFRAN) injection 4 mg  4 mg IntraVENous Q6H PRN  
 lactobac ac& pc-s.therm-b.anim (SENDY Q/RISAQUAD)  1 Cap Oral DAILY  insulin lispro (HUMALOG) injection   SubCUTAneous AC&HS  
 glucose chewable tablet 16 g  4 Tab Oral PRN  
 glucagon (GLUCAGEN) injection 1 mg  1 mg IntraMUSCular PRN  
 dextrose 10 % infusion 125-250 mL  125-250 mL IntraVENous PRN  
 [Held by provider] insulin lispro (HUMALOG) injection 5 Units  5 Units SubCUTAneous TIDAC  clopidogreL (PLAVIX) tablet 75 mg  75 mg Oral DAILY  sodium chloride (NS) flush 5-10 mL  5-10 mL IntraVENous PRN  
 
______________________________________________________________________ EXPECTED LENGTH OF STAY: 4d 19h ACTUAL LENGTH OF STAY:          Marivel Parker MD

## 2020-11-23 NOTE — DISCHARGE SUMMARY
[facility] Adult  Hospitalist Group Death Discharge Summary PATIENT ID: Libertad Emanuel MRN: [id number] YOB: 1931 DATE OF ADMISSION: 11/12/2020  2:06 PM   
PRIMARY CARE PROVIDER: UNKNOWN  
ATTENDING PHYSICIAN: Audelia Galdamez MD 
CONSULTATIONS:  
IP CONSULT TO CARDIOLOGY 
IP CONSULT TO CARDIOLOGY 
IP CONSULT TO INFECTIOUS DISEASES 
IP CONSULT TO PALLIATIVE CARE - PROVIDER 
IP CONSULT TO PSYCHIATRY PROCEDURES/SURGERIES:  
* No surgery found * REASON FOR ADMISSION: NSTEMI (non-ST elevated myocardial infarction) ([facility] [address] HOSPITAL PROBLEM LIST: 
Patient Active Problem List  
Diagnosis Code  NSTEMI (non-ST elevated myocardial infarction) ([facility] [address] I21.4 DATE AND TIME OF DEATH: 11/23/20 at 4:45am 
 
CODE STATUS AT DISCHARGE: 
 Full Code DNR Partial  
 [facility] DISCHARGE DIAGNOSES:  
Immediate cause of death: Acute hypoxemic respiratory failure 
  
Underlying cause: Non-ST elevation MI, sepsis, atrial fibrillation, acute kidney injury, abnormal liver function, T2DM Brief HPI and Hospital Course: As per initial admission summary This is an 51-year-old man with past medical history significant for dementia, status post CVA, and type 2 diabetes, who was in his usual state of health until the day of his presentation at the emergency room when the patient was found on the floor at the assisted living facility where the patient resides.  According to report, the patient was feeling hot all night long, he got up, felt weak and his legs gave out on him, the patient collapsed to the floor and unable to get up. [facility] neighbor heard him screaming all night long.  EMS was called.  When the EMS arrived at the scene, the patient's EKG shows possible ST elevation myocardial infarction.  This was called to the emergency room and the patient was brought to the emergency room as a code ST-elevation myocardial infarction.  When the patient arrived at the emergency room, the patient's EKG was reviewed by the cardiologist and code ST-elevation myocardial infarction was canceled. Assessment & Plan:  
  
1.  Non-ST elevation myocardial infarction:   
on Plavix,     Echocardiogram showing EF is <15%. Large apical thrombus likely ischemic cardiomyopathy , On lovenox inpatient and 6 to 8 weeks of anticoagulation w. Eliquis at [place].  \ if no fall risk, then continue Eliquis only and d/c Plavix at 8 weeks. 
  
2.  Sepsis:resolving-  No clear source of infection at presentation  
CT chest , Small bilateral pleural effusions with mild bibasilar atelectasis. 
  CT scan of the abdomen and pelvis, negative for any acute changes  
 on vancomycin and cefepime.  We will await blood culture results.   
As per CT maxifacial surgery: Right third maxillary molar demonstrates dental caries. There is a periapical lucency around the right third maxillary molar may represent a periapical 
abscess as well as inflammation in the adjacent gingiva. ID consulted , following, blood culture gram positive cocci 1/2  
  
#hyperkalemia, resolved BMP in AM 
  
3.   atrial fibrillation/SVT:    
Cardiology recs noted Prn IV digoxin if needed Not a good candidate for long term anticoagulation . 
  
4.  Acute kidney injury:  resolved  
due to volume depletion.  tx with IVFLuids 
  
5.  Acute rhabdomyolysis: resolved This is as a result of the patient being found on the floor of his apartment at the assisted living facility.  We will carry out fluid therapy and monitor the patient closely. 
  
6.  Abnormal liver function tests:   
Mild/stable- no plans for further workup due to plans for hospice. 
  
7.  Type 2 diabetes with hyperglycemia:  
Cont current plans for accuchecks and ss insulin  
  
8.  Syncope: resolved MRI and MRA negative for any stroke   
  
9.  Acute delirium:  waxing and waning This is most likely due to metabolic event.  CT scan of the head is negative. Will monitor for now  
  
10.  Fall:  The patient was found on the floor at the assisted living facility.  CT scan of the head is negative.  CT scan of the cervical spine did not show any acute fracture.   
PT/OT consult , needs detention vs SNF 
  
11.  Dementia:  The patient most likely has underlying memory impairment.   
No agitation or behavioral issues 
  
12.  Hypercalcemia:  monitor 
  
13. Acute hypoxemic resp failure- muntifactorial 
Mucous plugging, CHF, aspiration 
  In the early morning hours of 11/23/20; my coworker Dave Connolly NP was called to the bedside after the patient was found unresponsive. On exam, no heart sounds or breath sounds were noted after 1 minute of auscultation. Pupils were fixed and dilated without pupillary light reflex. Patient was pronounced dead on 11/23/2020  at 4:45am. 
 
Cause of Death: 
Immediate:Immediate: Acute hypoxemic respiratory failure due to aspiration pneumonia 
  
Underlying cause: Non-ST elevation MI, sepsis, atrial fibrillation, acute kidney injury, abnormal liver function, T2DM 
  
 
Events related to death: 
Was code called: NO 
 notified: [facility] Family notified: [facility] Autopsy requested: NO 
Death certificate completed: NO 
Code Status Prior to Death: DNR  
 
PHYSICAL EXAMINATION AT DISCHARGE: 
Patient Vitals for the past 24 hrs: 
 Temp Pulse Resp BP SpO2  
11/23/20 0141 98.2 °F (36.8 °C) 81 26 (!) 74/47 98 % 11/22/20 2026 98.5 °F (36.9 °C) 92 24 (!) 71/45 98 % 11/22/20 1555 97.8 °F (36.6 °C) (!) 112 24 91/60 96 % 11/22/20 0824 98.2 °F (36.8 °C) 83 24 (!) 72/57 96 % GEN: No response to verbal or tactile stimuli HEENT: Pupils fixed, dilated CV: No cardiac activity or heart sounds appreciated. No carotid pulse. PULM: No respiratory effort or spontaneous respirations. Ext: No peripheral pulses. Recent Results (from the past 24 hour(s)) GLUCOSE, POC  
 Collection Time: 11/22/20  9:17 PM  
Result Value Ref Range Glucose (POC) 248 (H) 65 - 100 mg/dL Performed by Miracle Dumont Signed:  
Eben Zamarripa MD 
Date of Service:  11/23/2020 
8:17 AM

## 2020-11-23 NOTE — PROGRESS NOTES
Patient pulse ox machine alarmed. Upon assessment patient was not breathing and no pulse. RN notified Avis Pate NP.  
NP pronounced dead and attempted to notify next of kin; left a message. [employer], Medical Examiner, pastoral care, and nursing supervisors notified. 0630 Patient's nephew called and RN spoke to him. He stated he did not want to come and see him and that we could discard of the patient's meds and patient's sweatpants. Pharmacy notified of patient's meds and they stated someone would come and get them. Patient cleaned up and taken to [facility] with RN and PCT; Record of Death given to supervisors.

## 2020-11-23 NOTE — PROGRESS NOTES
responded to pt death. Pt's nephew was not here however still trying to contact him.  had silent prayer at bedside. Please contact [phone] [address] [facility] for further support.  follow up as needed. [address] Danni Boyd, MACE 
 [phone]